# Patient Record
Sex: FEMALE | Race: WHITE | NOT HISPANIC OR LATINO | Employment: STUDENT | URBAN - METROPOLITAN AREA
[De-identification: names, ages, dates, MRNs, and addresses within clinical notes are randomized per-mention and may not be internally consistent; named-entity substitution may affect disease eponyms.]

---
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
## 2022-02-11 ENCOUNTER — TELEPHONE (OUTPATIENT)
Dept: OBSTETRICS AND GYNECOLOGY | Facility: CLINIC | Age: 22
End: 2022-02-11
Payer: COMMERCIAL

## 2022-02-11 NOTE — TELEPHONE ENCOUNTER
Pt thinks she may have a yeast infection because she is having thick white d/c.  Reports pain and bleeding during intercourse.  Also reports flank pain when she takes a deep breath and when she urinates.     Offered Rx for yeast.  Pt would like to be seen d/t recurrence.    Scheduled pt with Nneka at the [facility] on Monday.

## 2022-02-14 ENCOUNTER — OFFICE VISIT (OUTPATIENT)
Dept: OBSTETRICS AND GYNECOLOGY | Facility: CLINIC | Age: 22
End: 2022-02-14
Payer: COMMERCIAL

## 2022-02-14 ENCOUNTER — PATIENT MESSAGE (OUTPATIENT)
Dept: OBSTETRICS AND GYNECOLOGY | Facility: CLINIC | Age: 22
End: 2022-02-14

## 2022-02-14 VITALS
BODY MASS INDEX: 19.91 KG/M2 | DIASTOLIC BLOOD PRESSURE: 76 MMHG | SYSTOLIC BLOOD PRESSURE: 102 MMHG | WEIGHT: 119.5 LBS | HEIGHT: 65 IN

## 2022-02-14 DIAGNOSIS — N76.1 SUBACUTE VAGINITIS: Primary | ICD-10-CM

## 2022-02-14 PROCEDURE — 3074F PR MOST RECENT SYSTOLIC BLOOD PRESSURE < 130 MM HG: ICD-10-PCS | Mod: CPTII,S$GLB,, | Performed by: NURSE PRACTITIONER

## 2022-02-14 PROCEDURE — 3008F BODY MASS INDEX DOCD: CPT | Mod: CPTII,S$GLB,, | Performed by: NURSE PRACTITIONER

## 2022-02-14 PROCEDURE — 3008F PR BODY MASS INDEX (BMI) DOCUMENTED: ICD-10-PCS | Mod: CPTII,S$GLB,, | Performed by: NURSE PRACTITIONER

## 2022-02-14 PROCEDURE — 99999 PR PBB SHADOW E&M-EST. PATIENT-LVL III: ICD-10-PCS | Mod: PBBFAC,,, | Performed by: NURSE PRACTITIONER

## 2022-02-14 PROCEDURE — 3078F PR MOST RECENT DIASTOLIC BLOOD PRESSURE < 80 MM HG: ICD-10-PCS | Mod: CPTII,S$GLB,, | Performed by: NURSE PRACTITIONER

## 2022-02-14 PROCEDURE — 3078F DIAST BP <80 MM HG: CPT | Mod: CPTII,S$GLB,, | Performed by: NURSE PRACTITIONER

## 2022-02-14 PROCEDURE — 3074F SYST BP LT 130 MM HG: CPT | Mod: CPTII,S$GLB,, | Performed by: NURSE PRACTITIONER

## 2022-02-14 PROCEDURE — 99213 OFFICE O/P EST LOW 20 MIN: CPT | Mod: S$GLB,,, | Performed by: NURSE PRACTITIONER

## 2022-02-14 PROCEDURE — 1160F PR REVIEW ALL MEDS BY PRESCRIBER/CLIN PHARMACIST DOCUMENTED: ICD-10-PCS | Mod: CPTII,S$GLB,, | Performed by: NURSE PRACTITIONER

## 2022-02-14 PROCEDURE — 99999 PR PBB SHADOW E&M-EST. PATIENT-LVL III: CPT | Mod: PBBFAC,,, | Performed by: NURSE PRACTITIONER

## 2022-02-14 PROCEDURE — 87798 DETECT AGENT NOS DNA AMP: CPT | Mod: 59 | Performed by: NURSE PRACTITIONER

## 2022-02-14 PROCEDURE — 1159F MED LIST DOCD IN RCRD: CPT | Mod: CPTII,S$GLB,, | Performed by: NURSE PRACTITIONER

## 2022-02-14 PROCEDURE — 99213 PR OFFICE/OUTPT VISIT, EST, LEVL III, 20-29 MIN: ICD-10-PCS | Mod: S$GLB,,, | Performed by: NURSE PRACTITIONER

## 2022-02-14 PROCEDURE — 1160F RVW MEDS BY RX/DR IN RCRD: CPT | Mod: CPTII,S$GLB,, | Performed by: NURSE PRACTITIONER

## 2022-02-14 PROCEDURE — 1159F PR MEDICATION LIST DOCUMENTED IN MEDICAL RECORD: ICD-10-PCS | Mod: CPTII,S$GLB,, | Performed by: NURSE PRACTITIONER

## 2022-02-14 PROCEDURE — 87510 GARDNER VAG DNA DIR PROBE: CPT | Performed by: NURSE PRACTITIONER

## 2022-02-14 PROCEDURE — 87798 DETECT AGENT NOS DNA AMP: CPT | Performed by: NURSE PRACTITIONER

## 2022-02-14 NOTE — PROGRESS NOTES
CC: Vaginal Discharge    Inez Escobar is a 21 y.o. female  presents with complaint of vaginal discharge intermittently for the past 5-6 months. Reports irritation to vagina. She also reported occasional burning discomfort to vagina with intercourse and occasional deep pain with intercourse. Mirena IUD, new sexual partner x 5-6 months. History of yeast infections in the past. Declines STD testing.     ROS:  GENERAL: No fever, chills, fatigability or weight loss.  VULVAR: No pain, no lesions and no itching.  VAGINAL: No relaxation, no itching, no discharge, no abnormal bleeding and no lesions.  ABDOMEN: No abdominal pain. Denies nausea. Denies vomiting. No diarrhea. No constipation  BREAST: Denies pain. No lumps. No discharge.  URINARY: No incontinence, no nocturia, no frequency and no dysuria.  CARDIOVASCULAR: No chest pain. No shortness of breath. No leg cramps.  NEUROLOGICAL: No headaches. No vision changes.    PHYSICAL EXAM:  VULVA: normal appearing vulva with no masses, tenderness or lesions   VAGINA: normal appearing vagina with normal color and moderate amount of thin yellow/white discharge, no lesions   CERVIX: normal appearing cervix without discharge or lesions. IUD strings present, extending 2 cm from cervical os.   UTERUS: uterus is normal size, shape, consistency and nontender   ADNEXA: normal adnexa in size, nontender and no masses    ASSESSMENT and PLAN:    ICD-10-CM ICD-9-CM    1. Subacute vaginitis  N76.1 616.10 Vaginosis Screen by DNA Probe      Ureaplasma PCR Cervix      Mycoplasma genitalium Molecular Detection, PCR Cervix      Mycoplasma genitalium Molecular Detection, PCR Cervix      Ureaplasma PCR Cervix     Affirm, Mycoplasma/Ureaplasma testing, will await results for treatment.    Patient was counseled today on vaginitis prevention including :  a. avoiding feminine products such as deoderant soaps, body wash, bubble bath, douches, scented toilet paper, deoderant tampons or  pads, feminine wipes, chronic pad use, etc.  b. avoiding other vulvovaginal irritants such as long hot baths, humidity, tight, synthetic clothing, chlorine and sitting around in wet bathing suits  c. wearing cotton underwear, avoiding thong underwear and no underwear to bed  d. taking showers instead of baths and use a hair dryer on cool setting afterwards to dry  e. wearing cotton to exercise and shower immediately after exercise and change clothes  f. using polyurethane condoms without spermicide if sexually active and symptoms are triggered by intercourse    FOLLOW UP: PRN lack of improvement.      Nneka Herrera, FNP-C

## 2022-02-15 LAB
CANDIDA RRNA VAG QL PROBE: NEGATIVE
G VAGINALIS RRNA GENITAL QL PROBE: NEGATIVE
T VAGINALIS RRNA GENITAL QL PROBE: NEGATIVE

## 2022-02-16 ENCOUNTER — PATIENT MESSAGE (OUTPATIENT)
Dept: OBSTETRICS AND GYNECOLOGY | Facility: CLINIC | Age: 22
End: 2022-02-16
Payer: COMMERCIAL

## 2022-02-16 ENCOUNTER — TELEPHONE (OUTPATIENT)
Dept: OBSTETRICS AND GYNECOLOGY | Facility: CLINIC | Age: 22
End: 2022-02-16
Payer: COMMERCIAL

## 2022-02-16 NOTE — TELEPHONE ENCOUNTER
Unable to reach pt. To let her know that her results are not all final, they are still in process.

## 2022-02-17 LAB
MYCOPLASMA GENITALIUM RESULT: NEGATIVE
SPECIMEN SOURCE: ABNORMAL
SPECIMEN SOURCE: NORMAL
U PARVUM DNA SPEC QL NAA+PROBE: POSITIVE
U UREALYTICUM DNA SPEC QL NAA+PROBE: NEGATIVE

## 2022-02-21 ENCOUNTER — TELEPHONE (OUTPATIENT)
Dept: OBSTETRICS AND GYNECOLOGY | Facility: CLINIC | Age: 22
End: 2022-02-21
Payer: COMMERCIAL

## 2022-02-21 ENCOUNTER — PATIENT MESSAGE (OUTPATIENT)
Dept: OBSTETRICS AND GYNECOLOGY | Facility: CLINIC | Age: 22
End: 2022-02-21
Payer: COMMERCIAL

## 2022-02-23 ENCOUNTER — TELEPHONE (OUTPATIENT)
Dept: OBSTETRICS AND GYNECOLOGY | Facility: CLINIC | Age: 22
End: 2022-02-23
Payer: COMMERCIAL

## 2022-02-23 DIAGNOSIS — Z22.39 CARRIER OF UREAPLASMA UREALYTICUM: Primary | ICD-10-CM

## 2022-02-23 RX ORDER — METRONIDAZOLE 500 MG/1
500 TABLET ORAL 2 TIMES DAILY
Qty: 28 TABLET | Refills: 0 | Status: SHIPPED | OUTPATIENT
Start: 2022-02-23 | End: 2022-03-09

## 2022-02-23 RX ORDER — DOXYCYCLINE 100 MG/1
100 CAPSULE ORAL 2 TIMES DAILY
Qty: 28 CAPSULE | Refills: 0 | Status: SHIPPED | OUTPATIENT
Start: 2022-02-23 | End: 2022-03-09

## 2022-02-23 NOTE — TELEPHONE ENCOUNTER
Contact made with patient, +zunilda peguero to pharamcy with instructions. Possible ABX allergy for partner, he will request TX from his PCP.

## 2022-03-08 ENCOUNTER — PATIENT MESSAGE (OUTPATIENT)
Dept: OBSTETRICS AND GYNECOLOGY | Facility: CLINIC | Age: 22
End: 2022-03-08
Payer: COMMERCIAL

## 2022-05-12 ENCOUNTER — HOSPITAL ENCOUNTER (OUTPATIENT)
Dept: CARDIOLOGY | Facility: HOSPITAL | Age: 22
Discharge: HOME | End: 2022-05-12
Attending: INTERNAL MEDICINE
Payer: COMMERCIAL

## 2022-05-12 ENCOUNTER — OFFICE VISIT (OUTPATIENT)
Dept: INTERNAL MEDICINE | Facility: CLINIC | Age: 22
End: 2022-05-12
Payer: COMMERCIAL

## 2022-05-12 ENCOUNTER — LAB VISIT (OUTPATIENT)
Dept: LAB | Facility: HOSPITAL | Age: 22
End: 2022-05-12
Attending: INTERNAL MEDICINE
Payer: COMMERCIAL

## 2022-05-12 VITALS
WEIGHT: 120.13 LBS | BODY MASS INDEX: 20.01 KG/M2 | DIASTOLIC BLOOD PRESSURE: 70 MMHG | HEIGHT: 65 IN | OXYGEN SATURATION: 98 % | HEART RATE: 83 BPM | SYSTOLIC BLOOD PRESSURE: 110 MMHG

## 2022-05-12 DIAGNOSIS — R42 LIGHTHEADEDNESS: ICD-10-CM

## 2022-05-12 DIAGNOSIS — R10.9 ABDOMINAL CRAMPS: ICD-10-CM

## 2022-05-12 DIAGNOSIS — F31.9 BIPOLAR AFFECTIVE DISORDER, REMISSION STATUS UNSPECIFIED: ICD-10-CM

## 2022-05-12 DIAGNOSIS — R00.2 PALPITATIONS: Primary | ICD-10-CM

## 2022-05-12 DIAGNOSIS — R00.2 PALPITATIONS: ICD-10-CM

## 2022-05-12 DIAGNOSIS — R11.2 NAUSEA AND VOMITING, INTRACTABILITY OF VOMITING NOT SPECIFIED, UNSPECIFIED VOMITING TYPE: ICD-10-CM

## 2022-05-12 LAB
ALBUMIN SERPL BCP-MCNC: 3.9 G/DL (ref 3.5–5.2)
ALP SERPL-CCNC: 71 U/L (ref 55–135)
ALT SERPL W/O P-5'-P-CCNC: 33 U/L (ref 10–44)
AMYLASE SERPL-CCNC: 53 U/L (ref 20–110)
ANION GAP SERPL CALC-SCNC: 8 MMOL/L (ref 8–16)
AST SERPL-CCNC: 33 U/L (ref 10–40)
BASOPHILS # BLD AUTO: 0.05 K/UL (ref 0–0.2)
BASOPHILS NFR BLD: 1 % (ref 0–1.9)
BILIRUB SERPL-MCNC: 0.5 MG/DL (ref 0.1–1)
BUN SERPL-MCNC: 10 MG/DL (ref 6–20)
CALCIUM SERPL-MCNC: 9.7 MG/DL (ref 8.7–10.5)
CHLORIDE SERPL-SCNC: 104 MMOL/L (ref 95–110)
CO2 SERPL-SCNC: 25 MMOL/L (ref 23–29)
CREAT SERPL-MCNC: 0.7 MG/DL (ref 0.5–1.4)
DIFFERENTIAL METHOD: NORMAL
EOSINOPHIL # BLD AUTO: 0.1 K/UL (ref 0–0.5)
EOSINOPHIL NFR BLD: 2.9 % (ref 0–8)
ERYTHROCYTE [DISTWIDTH] IN BLOOD BY AUTOMATED COUNT: 11.9 % (ref 11.5–14.5)
EST. GFR  (AFRICAN AMERICAN): >60 ML/MIN/1.73 M^2
EST. GFR  (NON AFRICAN AMERICAN): >60 ML/MIN/1.73 M^2
GGT SERPL-CCNC: 24 U/L (ref 8–55)
GLUCOSE SERPL-MCNC: 88 MG/DL (ref 70–110)
HCT VFR BLD AUTO: 39.4 % (ref 37–48.5)
HGB BLD-MCNC: 13.2 G/DL (ref 12–16)
IMM GRANULOCYTES # BLD AUTO: 0 K/UL (ref 0–0.04)
IMM GRANULOCYTES NFR BLD AUTO: 0 % (ref 0–0.5)
LIPASE SERPL-CCNC: 28 U/L (ref 4–60)
LYMPHOCYTES # BLD AUTO: 1.7 K/UL (ref 1–4.8)
LYMPHOCYTES NFR BLD: 35.5 % (ref 18–48)
MCH RBC QN AUTO: 30.5 PG (ref 27–31)
MCHC RBC AUTO-ENTMCNC: 33.5 G/DL (ref 32–36)
MCV RBC AUTO: 91 FL (ref 82–98)
MONOCYTES # BLD AUTO: 0.6 K/UL (ref 0.3–1)
MONOCYTES NFR BLD: 12.5 % (ref 4–15)
NEUTROPHILS # BLD AUTO: 2.3 K/UL (ref 1.8–7.7)
NEUTROPHILS NFR BLD: 48.1 % (ref 38–73)
NRBC BLD-RTO: 0 /100 WBC
PLATELET # BLD AUTO: 306 K/UL (ref 150–450)
PMV BLD AUTO: 10.8 FL (ref 9.2–12.9)
POTASSIUM SERPL-SCNC: 4.1 MMOL/L (ref 3.5–5.1)
PROT SERPL-MCNC: 7 G/DL (ref 6–8.4)
RBC # BLD AUTO: 4.33 M/UL (ref 4–5.4)
SODIUM SERPL-SCNC: 137 MMOL/L (ref 136–145)
T4 FREE SERPL-MCNC: 0.82 NG/DL (ref 0.71–1.51)
TSH SERPL DL<=0.005 MIU/L-ACNC: 0.78 UIU/ML (ref 0.4–4)
WBC # BLD AUTO: 4.87 K/UL (ref 3.9–12.7)

## 2022-05-12 PROCEDURE — 36415 COLL VENOUS BLD VENIPUNCTURE: CPT | Performed by: INTERNAL MEDICINE

## 2022-05-12 PROCEDURE — 1159F PR MEDICATION LIST DOCUMENTED IN MEDICAL RECORD: ICD-10-PCS | Mod: CPTII,S$GLB,, | Performed by: INTERNAL MEDICINE

## 2022-05-12 PROCEDURE — 84443 ASSAY THYROID STIM HORMONE: CPT | Performed by: INTERNAL MEDICINE

## 2022-05-12 PROCEDURE — 3008F BODY MASS INDEX DOCD: CPT | Mod: CPTII,S$GLB,, | Performed by: INTERNAL MEDICINE

## 2022-05-12 PROCEDURE — 99214 PR OFFICE/OUTPT VISIT, EST, LEVL IV, 30-39 MIN: ICD-10-PCS | Mod: S$GLB,,, | Performed by: INTERNAL MEDICINE

## 2022-05-12 PROCEDURE — 93005 ELECTROCARDIOGRAM TRACING: CPT | Mod: S$GLB,,, | Performed by: INTERNAL MEDICINE

## 2022-05-12 PROCEDURE — 93005 EKG 12-LEAD: ICD-10-PCS | Mod: S$GLB,,, | Performed by: INTERNAL MEDICINE

## 2022-05-12 PROCEDURE — 83690 ASSAY OF LIPASE: CPT | Performed by: INTERNAL MEDICINE

## 2022-05-12 PROCEDURE — 85025 COMPLETE CBC W/AUTO DIFF WBC: CPT | Performed by: INTERNAL MEDICINE

## 2022-05-12 PROCEDURE — 82977 ASSAY OF GGT: CPT | Performed by: INTERNAL MEDICINE

## 2022-05-12 PROCEDURE — 99999 PR PBB SHADOW E&M-EST. PATIENT-LVL IV: ICD-10-PCS | Mod: PBBFAC,,, | Performed by: INTERNAL MEDICINE

## 2022-05-12 PROCEDURE — 93227 HOLTER MONITOR - 48 HOUR (CUPID ONLY): ICD-10-PCS | Mod: ,,, | Performed by: INTERNAL MEDICINE

## 2022-05-12 PROCEDURE — 3008F PR BODY MASS INDEX (BMI) DOCUMENTED: ICD-10-PCS | Mod: CPTII,S$GLB,, | Performed by: INTERNAL MEDICINE

## 2022-05-12 PROCEDURE — 99214 OFFICE O/P EST MOD 30 MIN: CPT | Mod: S$GLB,,, | Performed by: INTERNAL MEDICINE

## 2022-05-12 PROCEDURE — 84439 ASSAY OF FREE THYROXINE: CPT | Performed by: INTERNAL MEDICINE

## 2022-05-12 PROCEDURE — 3074F PR MOST RECENT SYSTOLIC BLOOD PRESSURE < 130 MM HG: ICD-10-PCS | Mod: CPTII,S$GLB,, | Performed by: INTERNAL MEDICINE

## 2022-05-12 PROCEDURE — 1159F MED LIST DOCD IN RCRD: CPT | Mod: CPTII,S$GLB,, | Performed by: INTERNAL MEDICINE

## 2022-05-12 PROCEDURE — 93226 XTRNL ECG REC<48 HR SCAN A/R: CPT | Mod: PO

## 2022-05-12 PROCEDURE — 3074F SYST BP LT 130 MM HG: CPT | Mod: CPTII,S$GLB,, | Performed by: INTERNAL MEDICINE

## 2022-05-12 PROCEDURE — 93010 EKG 12-LEAD: ICD-10-PCS | Mod: S$GLB,,, | Performed by: INTERNAL MEDICINE

## 2022-05-12 PROCEDURE — 3078F DIAST BP <80 MM HG: CPT | Mod: CPTII,S$GLB,, | Performed by: INTERNAL MEDICINE

## 2022-05-12 PROCEDURE — 80053 COMPREHEN METABOLIC PANEL: CPT | Performed by: INTERNAL MEDICINE

## 2022-05-12 PROCEDURE — 99999 PR PBB SHADOW E&M-EST. PATIENT-LVL IV: CPT | Mod: PBBFAC,,, | Performed by: INTERNAL MEDICINE

## 2022-05-12 PROCEDURE — 93010 ELECTROCARDIOGRAM REPORT: CPT | Mod: S$GLB,,, | Performed by: INTERNAL MEDICINE

## 2022-05-12 PROCEDURE — 82150 ASSAY OF AMYLASE: CPT | Performed by: INTERNAL MEDICINE

## 2022-05-12 PROCEDURE — 3078F PR MOST RECENT DIASTOLIC BLOOD PRESSURE < 80 MM HG: ICD-10-PCS | Mod: CPTII,S$GLB,, | Performed by: INTERNAL MEDICINE

## 2022-05-12 PROCEDURE — 93227 XTRNL ECG REC<48 HR R&I: CPT | Mod: ,,, | Performed by: INTERNAL MEDICINE

## 2022-05-12 RX ORDER — ISOTRETINOIN 30 MG/1
30 CAPSULE ORAL 2 TIMES DAILY
COMMUNITY
Start: 2022-04-28 | End: 2022-12-22

## 2022-05-12 RX ORDER — TRIAMCINOLONE ACETONIDE 1 MG/G
CREAM TOPICAL
COMMUNITY
Start: 2022-04-26 | End: 2022-12-22

## 2022-05-12 NOTE — PROGRESS NOTES
[facility]    Chief Complaint      Chief Complaint   Patient presents with    Establish Care    Abdominal Pain    Dizziness    Nausea       History of Present Illness      Inez Escobar is a 21 y.o. female with chronic conditions of anxiety, depression who presents today for: establish care and complaints of nausea with occasional vomiting, abdominal cramps and burning (no reliable), constipation, occasional blood in stool.  No urinary complaints.  Symptoms not postprandial.  Symptoms can improve with eating for a short period of time.  Usually when awake. Symptoms have been present since childhood.  Symptoms seem worse during menstrual cycle.  Has seen Dr. Smith, GI, and will be getting EGD/cscope in 2 weeks.   Complains of lightheadedness/orthostasis in the past 3-4 months. Occurs few times weekly.   At this point, can last throughout the day.  Anxiety, depression: Sees Dr. Isa Blackwell, psychiatry.  On lexapro (few years), lamictal (started 7/2021).  Pt feels like her symptom control has been good on this regimen.    Flu shot missed last season.  TdAP 2014.      Past Medical History:  Past Medical History:   Diagnosis Date    Anxiety     Bipolar 1 disorder     Depression        Past Surgical History:   has no past surgical history on file.    Family History:  family history includes Alcohol abuse in her father and paternal grandmother; Arrhythmia in her maternal grandmother; Congenital heart disease in her maternal grandmother; Depression in her father, maternal grandmother, and mother; Diabetes in her father, maternal grandmother, and paternal grandmother; Drug abuse in her father, maternal aunt, and paternal grandmother; Emphysema in her maternal grandfather; Heart disease in her father and maternal grandmother; Mental illness in her father, maternal aunt, maternal aunt, maternal grandmother, and mother; Migraines in her mother; Miscarriages / Stillbirths in her  mother; Multiple sclerosis in her paternal grandmother; No Known Problems in her brother, paternal grandfather, and sister.     Social History:  Social History     Tobacco Use    Smoking status: Never Smoker    Smokeless tobacco: Never Used   Substance Use Topics    Alcohol use: No    Drug use: Never       I personally reviewed all past medical, surgical, social and family history.    Review of Systems   Constitutional: Negative for chills, fever and malaise/fatigue.   Respiratory: Negative for shortness of breath.    Cardiovascular: Negative for chest pain.   Gastrointestinal: Negative for constipation, diarrhea, nausea and vomiting.   Skin: Negative for rash.   Neurological: Negative for weakness.   All other systems reviewed and are negative.       Medications:  Outpatient Encounter Medications as of 2022   Medication Sig Dispense Refill    ABSORICA 30 mg capsule Take 30 mg by mouth 2 (two) times daily.      EScitalopram oxalate (LEXAPRO) 20 MG tablet Take 20 mg by mouth once daily.      lamoTRIgine (LAMICTAL) 200 MG tablet Take 200 mg by mouth once daily.      triamcinolone acetonide 0.1% (KENALOG) 0.1 % cream SMARTSI Application Topical 2-3 Times Daily      valACYclovir (VALTREX) 500 MG tablet TK 1 T PO BID FOR 7 DAYS 30 tablet 3    [DISCONTINUED] fluconazole (DIFLUCAN) 150 MG Tab 1 pod QD and if not better in 3 days take a second pill 2 tablet 0    [DISCONTINUED] propranoloL (INDERAL) 10 MG tablet Take 10 mg by mouth once daily.       Facility-Administered Encounter Medications as of 2022   Medication Dose Route Frequency Provider Last Rate Last Admin    levonorgestreL 20 mcg/24 hours (5 yrs) 52 mg IUD 1 Intra Uterine Device  1 Intra Uterine Device Intrauterine  Karie Storey MD   1 Intra Uterine Device at 20 1415       Allergies:  Review of patient's allergies indicates:  No Known Allergies    Health Maintenance:  Immunization History   Administered Date(s) Administered     "DTaP 01/21/2003, 05/05/2003, 08/05/2003, 09/09/2004, 02/11/2008    HPV 9-Valent 08/01/2019, 09/02/2020    Hep B / HiB 2002, 03/13/2003, 11/17/2003    Hepatitis A, Pediatric/Adolescent, 2 Dose 08/01/2019    IPV 01/21/2003, 05/05/2003, 02/06/2004, 09/09/2004, 02/05/2009    MMR 02/06/2004, 02/11/2008    Meningococcal Conjugate (MCV4P) 06/25/2014, 08/01/2019    Pneumococcal Conjugate - 7 Valent 2002, 03/13/2003, 07/23/2003, 09/09/2004    Tdap 06/25/2014    Varicella 11/17/2003, 02/11/2008      Health Maintenance   Topic Date Due    Hepatitis C Screening  Never done    Lipid Panel  Never done    HPV Vaccines (3 - 3-dose series) 01/02/2021    Chlamydia Screening  11/29/2022    TETANUS VACCINE  06/25/2024        Physical Exam      Vital Signs  Pulse: 83  SpO2: 98 %  BP: 110/70  BP Location: Right arm  Patient Position: Sitting  Pain Score:   4  Pain Loc: Abdomen  Height and Weight  Height: 5' 5" (165.1 cm)  Weight: 54.5 kg (120 lb 2.4 oz)  BSA (Calculated - sq m): 1.58 sq meters  BMI (Calculated): 20  Weight in (lb) to have BMI = 25: 149.9]    Physical Exam  Vitals reviewed.   Constitutional:       Appearance: She is well-developed.   HENT:      Head: Normocephalic and atraumatic.      Right Ear: External ear normal.      Left Ear: External ear normal.   Cardiovascular:      Rate and Rhythm: Normal rate and regular rhythm.      Heart sounds: Normal heart sounds. No murmur heard.  Pulmonary:      Effort: Pulmonary effort is normal.      Breath sounds: Normal breath sounds. No wheezing or rales.   Abdominal:      General: Bowel sounds are normal. There is no distension.      Palpations: Abdomen is soft.      Tenderness: There is no abdominal tenderness.          Laboratory:  CBC:      CMP:        Invalid input(s): CREATININ  URINALYSIS:       LIPIDS:      TSH:      A1C:        Assessment/Plan     Inez Escobar is a 21 y.o.female with:    1. Palpitations  - Ambulatory referral/consult to " Cardiology; Future  - EKG 12-lead  - Holter monitor - 48 hour; Future  EKG in office with normal sinus rhythm, normal intervals, no ST segment changes or TWI.  Will check   2. Lightheadedness  - Ambulatory referral/consult to Cardiology; Future  - EKG 12-lead  - Holter monitor - 48 hour; Future  3. Abdominal cramps  - US Abdomen Complete; Future  4. Nausea and vomiting, intractability of vomiting not specified, unspecified vomiting type  - US Abdomen Complete; Future  Take dicyclomine, ondansetron previously prescribed as needed.    5. Bipolar affective disorder, remission status unspecified  Continue current meds.      Chronic conditions status updated as per HPI.  Other than changes above, cont current medications and maintain follow up with specialists.  No follow-ups on file.    No future appointments.    Chepe Cheek MD  [facility]

## 2022-05-16 ENCOUNTER — HOSPITAL ENCOUNTER (OUTPATIENT)
Dept: RADIOLOGY | Facility: HOSPITAL | Age: 22
Discharge: HOME | End: 2022-05-16
Attending: INTERNAL MEDICINE
Payer: COMMERCIAL

## 2022-05-16 DIAGNOSIS — R10.9 ABDOMINAL CRAMPS: ICD-10-CM

## 2022-05-16 DIAGNOSIS — R11.2 NAUSEA AND VOMITING, INTRACTABILITY OF VOMITING NOT SPECIFIED, UNSPECIFIED VOMITING TYPE: ICD-10-CM

## 2022-05-16 PROCEDURE — 76700 US EXAM ABDOM COMPLETE: CPT | Mod: TC,PO

## 2022-05-16 PROCEDURE — 76700 US EXAM ABDOM COMPLETE: CPT | Mod: 26,,, | Performed by: RADIOLOGY

## 2022-05-16 PROCEDURE — 76700 US ABDOMEN COMPLETE: ICD-10-PCS | Mod: 26,,, | Performed by: RADIOLOGY

## 2022-05-19 LAB
OHS CV EVENT MONITOR DAY: 0
OHS CV HOLTER LENGTH DECIMAL HOURS: 47.98
OHS CV HOLTER LENGTH HOURS: 47
OHS CV HOLTER LENGTH MINUTES: 59
OHS CV HOLTER SINUS AVERAGE HR: 79
OHS CV HOLTER SINUS MAX HR: 162
OHS CV HOLTER SINUS MIN HR: 49

## 2022-05-19 NOTE — PROGRESS NOTES
Holter monitor shows a benign fast heart rate called sinus tachycardia.  This suggests that there is no harmful underlying heart electrical condition contributing to her palpitations.  I can consult with a cardiologist on her behalf via an e-consult to determine if any further testing is needed or if any medication can be offered.  If she's interested (small fee associated), I can proceed.

## 2022-05-20 ENCOUNTER — PATIENT MESSAGE (OUTPATIENT)
Dept: INTERNAL MEDICINE | Facility: CLINIC | Age: 22
End: 2022-05-20
Payer: COMMERCIAL

## 2022-05-20 DIAGNOSIS — R42 LIGHTHEADEDNESS: ICD-10-CM

## 2022-05-20 DIAGNOSIS — R00.2 PALPITATIONS: Primary | ICD-10-CM

## 2022-05-22 ENCOUNTER — E-CONSULT (OUTPATIENT)
Dept: CARDIOLOGY | Facility: CLINIC | Age: 22
End: 2022-05-22
Payer: COMMERCIAL

## 2022-05-22 DIAGNOSIS — R42 DIZZINESS: ICD-10-CM

## 2022-05-22 PROCEDURE — 99446 NTRPROF PH1/NTRNET/EHR 5-10: CPT | Mod: S$GLB,,, | Performed by: INTERNAL MEDICINE

## 2022-05-22 PROCEDURE — 99446 PR INTERPROF, PHONE/INTERNET/EHR, CONSULT, 5-10 MINS: ICD-10-PCS | Mod: S$GLB,,, | Performed by: INTERNAL MEDICINE

## 2022-05-22 NOTE — PROGRESS NOTES
[facility]  Response for E-Consult     Patient Name: Inez Escobar  MRN: [id number]  Primary Care Provider: Chepe Cheek MD   Requesting Provider: Mychart, Generic Provid*      Findings: Chart and holter reviewed, I would order echo to rule out structural heart disease, otherwise sx may be vasovagal, I would just monitor, no B blockers indicated  Currently. Pt can follow up in cardiology clinic if sx persist    I did not speak to the requesting provider verbally about this.     Total time of Consultation: 10 minute    Percentage of time spent on verbal/written discussion: 50%     Thank you for your consult.     Edgard Pena MD  [facility]

## 2022-05-24 ENCOUNTER — PATIENT OUTREACH (OUTPATIENT)
Dept: ADMINISTRATIVE | Facility: HOSPITAL | Age: 22
End: 2022-05-24
Payer: COMMERCIAL

## 2022-05-24 NOTE — PROGRESS NOTES
Health Maintenance Due   Topic Date Due    Hepatitis C Screening  Never done    Lipid Panel  Never done    COVID-19 Vaccine (1) Never done    HIV Screening  Never done    HPV Vaccines (3 - 3-dose series) 01/02/2021        updated.  Immunizations reconciled.  EGD and Colonoscopy report scanned into chart and sent to PCP.    Emily Huber LPN   Clinical Care Coordinator  [facility]ness

## 2022-05-26 ENCOUNTER — OFFICE VISIT (OUTPATIENT)
Dept: OBSTETRICS AND GYNECOLOGY | Facility: CLINIC | Age: 22
End: 2022-05-26
Attending: OBSTETRICS & GYNECOLOGY
Payer: COMMERCIAL

## 2022-05-26 ENCOUNTER — TELEPHONE (OUTPATIENT)
Dept: OBSTETRICS AND GYNECOLOGY | Facility: CLINIC | Age: 22
End: 2022-05-26
Payer: COMMERCIAL

## 2022-05-26 VITALS
BODY MASS INDEX: 19.54 KG/M2 | HEIGHT: 65 IN | WEIGHT: 117.31 LBS | SYSTOLIC BLOOD PRESSURE: 112 MMHG | DIASTOLIC BLOOD PRESSURE: 62 MMHG

## 2022-05-26 DIAGNOSIS — N90.89 SWELLING OF VULVA: ICD-10-CM

## 2022-05-26 DIAGNOSIS — N76.0 ACUTE VAGINITIS: Primary | ICD-10-CM

## 2022-05-26 PROCEDURE — 99213 OFFICE O/P EST LOW 20 MIN: CPT | Mod: S$GLB,,, | Performed by: OBSTETRICS & GYNECOLOGY

## 2022-05-26 PROCEDURE — 3074F PR MOST RECENT SYSTOLIC BLOOD PRESSURE < 130 MM HG: ICD-10-PCS | Mod: CPTII,S$GLB,, | Performed by: OBSTETRICS & GYNECOLOGY

## 2022-05-26 PROCEDURE — 3008F PR BODY MASS INDEX (BMI) DOCUMENTED: ICD-10-PCS | Mod: CPTII,S$GLB,, | Performed by: OBSTETRICS & GYNECOLOGY

## 2022-05-26 PROCEDURE — 99999 PR PBB SHADOW E&M-EST. PATIENT-LVL III: ICD-10-PCS | Mod: PBBFAC,,, | Performed by: OBSTETRICS & GYNECOLOGY

## 2022-05-26 PROCEDURE — 3078F DIAST BP <80 MM HG: CPT | Mod: CPTII,S$GLB,, | Performed by: OBSTETRICS & GYNECOLOGY

## 2022-05-26 PROCEDURE — 3078F PR MOST RECENT DIASTOLIC BLOOD PRESSURE < 80 MM HG: ICD-10-PCS | Mod: CPTII,S$GLB,, | Performed by: OBSTETRICS & GYNECOLOGY

## 2022-05-26 PROCEDURE — 87801 DETECT AGNT MULT DNA AMPLI: CPT | Performed by: OBSTETRICS & GYNECOLOGY

## 2022-05-26 PROCEDURE — 87481 CANDIDA DNA AMP PROBE: CPT | Mod: 59 | Performed by: OBSTETRICS & GYNECOLOGY

## 2022-05-26 PROCEDURE — 99999 PR PBB SHADOW E&M-EST. PATIENT-LVL III: CPT | Mod: PBBFAC,,, | Performed by: OBSTETRICS & GYNECOLOGY

## 2022-05-26 PROCEDURE — 1159F MED LIST DOCD IN RCRD: CPT | Mod: CPTII,S$GLB,, | Performed by: OBSTETRICS & GYNECOLOGY

## 2022-05-26 PROCEDURE — 1159F PR MEDICATION LIST DOCUMENTED IN MEDICAL RECORD: ICD-10-PCS | Mod: CPTII,S$GLB,, | Performed by: OBSTETRICS & GYNECOLOGY

## 2022-05-26 PROCEDURE — 3074F SYST BP LT 130 MM HG: CPT | Mod: CPTII,S$GLB,, | Performed by: OBSTETRICS & GYNECOLOGY

## 2022-05-26 PROCEDURE — 99213 PR OFFICE/OUTPT VISIT, EST, LEVL III, 20-29 MIN: ICD-10-PCS | Mod: S$GLB,,, | Performed by: OBSTETRICS & GYNECOLOGY

## 2022-05-26 PROCEDURE — 3008F BODY MASS INDEX DOCD: CPT | Mod: CPTII,S$GLB,, | Performed by: OBSTETRICS & GYNECOLOGY

## 2022-05-26 NOTE — TELEPHONE ENCOUNTER
Pt had a colonoscopy 2 days ago where they took multiple biopsies.  Had intercourse yesterday but once she started feeling pain, she stopped.  Her vagina seemed swollen and 4-5 hours later looked worse.  Pt's mother looked and said something was hanging out.  Spoke to her medical friends and was told it's possibly a prolapse.  Had some spotting yesterday but currently denies VB or pain at this time.  Pt can visually see an inch coming down from the labia.  Pt requesting to be seen today.    Advised that Dr. Storey had a full schedule with pts in labor and didn't want to add her on in case she wouldn't able to be seen, but would check with MD.    Please advise, thanks

## 2022-05-26 NOTE — PROGRESS NOTES
Subjective:       Patient ID: Inez Escobar is a 21 y.o. female.    Chief Complaint:  Vaginal Prolapse        History of Present Illness  Inez Escobar is a 21 y.o. female  called today asking to be seen due to vaginal pain and swelling. She had a colonoscopy 2 days ago. A few biopsies were done. Then the day after the colonoscopy she had sex. She had to stop in the middle because of pain. She looked and it was swollen and something was sticking out. Mom looked and they were concerned for prolapse. Therefore she was brought in today for a visit. No fever or chills. No new soaps, detergents, lubricants, condoms, etc.     Patient's last menstrual period was 2022 (lmp unknown).   Date of Last Pap: No result found    Review of Systems  Review of Systems   Constitutional: Negative for chills and fever.        Objective:   Physical Exam:               Genitourinary: The external female genitalia was abnormal.      Genitourinary Comments: Bilateral labia majora and minora are swollen, looks like an allergic reaction. No ulcers, +white discharge                        Assessment/ Plan:     1. Acute vaginitis  Vaginosis Screen by DNA Probe   2. Swelling of vulva       Looks like an allergic reaction. Swelling of bilateral labia. Possibly from the prep/betadine place before colonoscopy??  She says it is much better today than yesterday, so for now I would just watch it and do nothing  Take Benadryl    Follow up if symptoms worsen or fail to improve.    As of 2021, the Cures Act has been passed nationally. This new law requires that all doctors progress notes, lab results, pathology reports and radiology reports be released IMMEDIATELY to the patient in the patient portal. That means that the results are released to you at the EXACT same time they are released to me. Therefore, with all of the patients that I have I am not able to reply to each patient exactly when the results come  in. So there will be a delay from when you see the results to when I see them and have time to come up with a response to send you. Also I only see these results when I am on the computer at work. So if the results come in over the weekend or after 5 pm of a work day, I will not see them until the next business day. As you can tell, this is a challenge as a physician to give every patient the quick response they hope for and deserve. So please be patient! Thanks for understanding, Dr. Storey

## 2022-05-26 NOTE — TELEPHONE ENCOUNTER
Advised per Dr. Storey.  [place] and will take 30-45 minutes.    Scheduled pt today.

## 2022-05-27 ENCOUNTER — PATIENT MESSAGE (OUTPATIENT)
Dept: OBSTETRICS AND GYNECOLOGY | Facility: CLINIC | Age: 22
End: 2022-05-27
Payer: COMMERCIAL

## 2022-05-30 LAB
BACTERIAL VAGINOSIS DNA: NEGATIVE
CANDIDA GLABRATA DNA: NEGATIVE
CANDIDA KRUSEI DNA: NEGATIVE
CANDIDA RRNA VAG QL PROBE: POSITIVE
T VAGINALIS RRNA GENITAL QL PROBE: NEGATIVE

## 2022-05-31 ENCOUNTER — TELEPHONE (OUTPATIENT)
Dept: OBSTETRICS AND GYNECOLOGY | Facility: CLINIC | Age: 22
End: 2022-05-31

## 2022-05-31 ENCOUNTER — PATIENT MESSAGE (OUTPATIENT)
Dept: INTERNAL MEDICINE | Facility: CLINIC | Age: 22
End: 2022-05-31
Payer: COMMERCIAL

## 2022-05-31 DIAGNOSIS — N76.0 ACUTE VAGINITIS: Primary | ICD-10-CM

## 2022-05-31 RX ORDER — FLUCONAZOLE 150 MG/1
150 TABLET ORAL DAILY
Qty: 2 TABLET | Refills: 0 | Status: SHIPPED | OUTPATIENT
Start: 2022-05-31 | End: 2022-06-02

## 2022-05-31 NOTE — TELEPHONE ENCOUNTER
Pt was in on 5/26 for what she thought was a vaginal prolapse.  A swab was done and she is positive for Candida.  Requesting Rx.  Also inquiring if GI got back with Dr. Storey regarding the ingredients of the prep used before her colonoscopy since that may have been the cause of her allergic rxn.  Aware Dr. Storey is not in today.    Diflucan pended    Dr. Bedolla, will you sign?  Dr. Storey, please advised regarding if GI got back with you, thanks!

## 2022-06-01 NOTE — TELEPHONE ENCOUNTER
Advised per Dr. Storey that GI has not gotten back to her.  But will keep pt posted as soon as we hear something.    Pt states her swelling has completely gone down.

## 2022-06-02 ENCOUNTER — PATIENT MESSAGE (OUTPATIENT)
Dept: INTERNAL MEDICINE | Facility: CLINIC | Age: 22
End: 2022-06-02
Payer: COMMERCIAL

## 2022-06-02 NOTE — PROGRESS NOTES
Please let patient know that cardiology has reviewed her case and is recommending an echocardiogram to evaluate for structural heart disease.  No medications to slow down rate of palpitations or heart rate is recommended at this time.  Order in for echo to schedule

## 2022-06-08 ENCOUNTER — PATIENT MESSAGE (OUTPATIENT)
Dept: OBSTETRICS AND GYNECOLOGY | Facility: CLINIC | Age: 22
End: 2022-06-08
Payer: COMMERCIAL

## 2022-06-09 ENCOUNTER — OFFICE VISIT (OUTPATIENT)
Dept: CARDIOLOGY | Facility: CLINIC | Age: 22
End: 2022-06-09
Payer: COMMERCIAL

## 2022-06-09 VITALS
SYSTOLIC BLOOD PRESSURE: 103 MMHG | DIASTOLIC BLOOD PRESSURE: 62 MMHG | WEIGHT: 119.06 LBS | HEIGHT: 65 IN | BODY MASS INDEX: 19.83 KG/M2 | HEART RATE: 91 BPM

## 2022-06-09 DIAGNOSIS — R00.2 PALPITATIONS: ICD-10-CM

## 2022-06-09 DIAGNOSIS — R42 LIGHTHEADEDNESS: ICD-10-CM

## 2022-06-09 DIAGNOSIS — U09.9 POST-COVID-19 CONDITION: ICD-10-CM

## 2022-06-09 PROCEDURE — 3074F PR MOST RECENT SYSTOLIC BLOOD PRESSURE < 130 MM HG: ICD-10-PCS | Mod: CPTII,S$GLB,, | Performed by: INTERNAL MEDICINE

## 2022-06-09 PROCEDURE — 3008F BODY MASS INDEX DOCD: CPT | Mod: CPTII,S$GLB,, | Performed by: INTERNAL MEDICINE

## 2022-06-09 PROCEDURE — 99999 PR PBB SHADOW E&M-EST. PATIENT-LVL III: ICD-10-PCS | Mod: PBBFAC,,, | Performed by: INTERNAL MEDICINE

## 2022-06-09 PROCEDURE — 99999 PR PBB SHADOW E&M-EST. PATIENT-LVL III: CPT | Mod: PBBFAC,,, | Performed by: INTERNAL MEDICINE

## 2022-06-09 PROCEDURE — 3078F PR MOST RECENT DIASTOLIC BLOOD PRESSURE < 80 MM HG: ICD-10-PCS | Mod: CPTII,S$GLB,, | Performed by: INTERNAL MEDICINE

## 2022-06-09 PROCEDURE — 99204 PR OFFICE/OUTPT VISIT, NEW, LEVL IV, 45-59 MIN: ICD-10-PCS | Mod: S$GLB,,, | Performed by: INTERNAL MEDICINE

## 2022-06-09 PROCEDURE — 1159F MED LIST DOCD IN RCRD: CPT | Mod: CPTII,S$GLB,, | Performed by: INTERNAL MEDICINE

## 2022-06-09 PROCEDURE — 1159F PR MEDICATION LIST DOCUMENTED IN MEDICAL RECORD: ICD-10-PCS | Mod: CPTII,S$GLB,, | Performed by: INTERNAL MEDICINE

## 2022-06-09 PROCEDURE — 3078F DIAST BP <80 MM HG: CPT | Mod: CPTII,S$GLB,, | Performed by: INTERNAL MEDICINE

## 2022-06-09 PROCEDURE — 3008F PR BODY MASS INDEX (BMI) DOCUMENTED: ICD-10-PCS | Mod: CPTII,S$GLB,, | Performed by: INTERNAL MEDICINE

## 2022-06-09 PROCEDURE — 3074F SYST BP LT 130 MM HG: CPT | Mod: CPTII,S$GLB,, | Performed by: INTERNAL MEDICINE

## 2022-06-09 PROCEDURE — 99204 OFFICE O/P NEW MOD 45 MIN: CPT | Mod: S$GLB,,, | Performed by: INTERNAL MEDICINE

## 2022-06-09 RX ORDER — FLUCONAZOLE 150 MG/1
150 TABLET ORAL DAILY
Qty: 1 TABLET | Refills: 1 | Status: SHIPPED | OUTPATIENT
Start: 2022-06-09 | End: 2022-06-11

## 2022-06-09 NOTE — PROGRESS NOTES
Subjective:    Patient ID:  Inez Escobar is a 21 y.o. female who presents for evaluation of Establish Care and Results      HPI21 yo WF with palpitations, light headed and numbness in both arms at times. EKG normal and had holter that is benign. Stays active and not any problems with exertion. Did have covid in the past.     Review of Systems   Constitutional: Negative for decreased appetite, fever, malaise/fatigue, weight gain and weight loss.   HENT: Negative for hearing loss and nosebleeds.    Eyes: Negative for visual disturbance.   Cardiovascular: Positive for irregular heartbeat and palpitations. Negative for chest pain, claudication, cyanosis, dyspnea on exertion, leg swelling, near-syncope, orthopnea, paroxysmal nocturnal dyspnea and syncope.   Respiratory: Negative for cough, hemoptysis, shortness of breath, sleep disturbances due to breathing, snoring and wheezing.    Endocrine: Negative for cold intolerance, heat intolerance, polydipsia and polyuria.   Hematologic/Lymphatic: Negative for adenopathy and bleeding problem. Does not bruise/bleed easily.   Skin: Negative for color change, itching, poor wound healing, rash and suspicious lesions.   Musculoskeletal: Negative for arthritis, back pain, falls, joint pain, joint swelling, muscle cramps, muscle weakness and myalgias.   Gastrointestinal: Negative for bloating, abdominal pain, change in bowel habit, constipation, flatus, heartburn, hematemesis, hematochezia, hemorrhoids, jaundice, melena, nausea and vomiting.   Genitourinary: Negative for bladder incontinence, decreased libido, frequency, hematuria, hesitancy and urgency.   Neurological: Positive for light-headedness. Negative for brief paralysis, difficulty with concentration, excessive daytime sleepiness, dizziness, focal weakness, headaches, loss of balance, numbness, vertigo and weakness.   Psychiatric/Behavioral: Negative for altered mental status, depression and memory loss. The  "patient is nervous/anxious. The patient does not have insomnia.    Allergic/Immunologic: Negative for environmental allergies, hives and persistent infections.        Objective:    Physical Exam  Vitals and nursing note reviewed.   Constitutional:       Appearance: She is well-developed.      Comments: /62 (BP Location: Right arm, Patient Position: Sitting, BP Method: Small (Automatic))   Pulse 91   Ht 5' 5" (1.651 m)   Wt 54 kg (119 lb 0.8 oz)   LMP 05/19/2022 (LMP Unknown)   BMI 19.81 kg/m²      HENT:      Head: Normocephalic and atraumatic.      Right Ear: External ear normal.      Left Ear: External ear normal.      Nose: Nose normal.   Eyes:      General: Lids are normal. No scleral icterus.        Right eye: No discharge.         Left eye: No discharge.      Conjunctiva/sclera: Conjunctivae normal.      Right eye: No hemorrhage.     Pupils: Pupils are equal, round, and reactive to light.   Neck:      Thyroid: No thyromegaly.      Vascular: No JVD.      Trachea: No tracheal deviation.   Cardiovascular:      Rate and Rhythm: Normal rate and regular rhythm.      Pulses: Intact distal pulses.      Heart sounds: Normal heart sounds. No murmur heard.    No friction rub. No gallop.   Pulmonary:      Effort: Pulmonary effort is normal. No respiratory distress.      Breath sounds: Normal breath sounds. No wheezing or rales.   Chest:      Chest wall: No tenderness.   Breasts: Breasts are symmetrical.       Abdominal:      General: Bowel sounds are normal. There is no distension.      Palpations: Abdomen is soft. There is no hepatomegaly or mass.      Tenderness: There is no abdominal tenderness. There is no guarding or rebound.   Musculoskeletal:         General: No tenderness. Normal range of motion.      Cervical back: Normal range of motion and neck supple.   Lymphadenopathy:      Cervical: No cervical adenopathy.   Skin:     General: Skin is warm and dry.      Coloration: Skin is not pale.      Findings: " No erythema or rash.   Neurological:      Mental Status: She is alert and oriented to person, place, and time.      Cranial Nerves: No cranial nerve deficit.      Coordination: Coordination normal.      Deep Tendon Reflexes: Reflexes normal.   Psychiatric:         Behavior: Behavior normal.         Thought Content: Thought content normal.         Judgment: Judgment normal.           Assessment:       1. Palpitations    2. Lightheadedness    3. Post-COVID-19 condition         Plan:     EKG and holter unremarkable    Possible post COVID symptoms    Suggested get a KARDIA device to monitor heart when symptomatic and send us the strips.   Patient advised to limit exposure to caffeine, stimulants, and alcohol    No orders of the defined types were placed in this encounter.    Follow up in about 1 year (around 6/9/2023).

## 2022-06-13 ENCOUNTER — PATIENT MESSAGE (OUTPATIENT)
Dept: OBSTETRICS AND GYNECOLOGY | Facility: CLINIC | Age: 22
End: 2022-06-13
Payer: COMMERCIAL

## 2022-06-15 ENCOUNTER — OFFICE VISIT (OUTPATIENT)
Dept: OBSTETRICS AND GYNECOLOGY | Facility: CLINIC | Age: 22
End: 2022-06-15
Attending: OBSTETRICS & GYNECOLOGY
Payer: COMMERCIAL

## 2022-06-15 VITALS — BODY MASS INDEX: 19.48 KG/M2 | HEIGHT: 65 IN | WEIGHT: 116.94 LBS

## 2022-06-15 DIAGNOSIS — N89.8 VAGINAL DISCHARGE: Primary | ICD-10-CM

## 2022-06-15 DIAGNOSIS — N64.4 BREAST PAIN, LEFT: ICD-10-CM

## 2022-06-15 PROCEDURE — 1159F MED LIST DOCD IN RCRD: CPT | Mod: CPTII,S$GLB,, | Performed by: OBSTETRICS & GYNECOLOGY

## 2022-06-15 PROCEDURE — 87481 CANDIDA DNA AMP PROBE: CPT | Mod: 59 | Performed by: OBSTETRICS & GYNECOLOGY

## 2022-06-15 PROCEDURE — 3008F PR BODY MASS INDEX (BMI) DOCUMENTED: ICD-10-PCS | Mod: CPTII,S$GLB,, | Performed by: OBSTETRICS & GYNECOLOGY

## 2022-06-15 PROCEDURE — 1160F RVW MEDS BY RX/DR IN RCRD: CPT | Mod: CPTII,S$GLB,, | Performed by: OBSTETRICS & GYNECOLOGY

## 2022-06-15 PROCEDURE — 3008F BODY MASS INDEX DOCD: CPT | Mod: CPTII,S$GLB,, | Performed by: OBSTETRICS & GYNECOLOGY

## 2022-06-15 PROCEDURE — 1160F PR REVIEW ALL MEDS BY PRESCRIBER/CLIN PHARMACIST DOCUMENTED: ICD-10-PCS | Mod: CPTII,S$GLB,, | Performed by: OBSTETRICS & GYNECOLOGY

## 2022-06-15 PROCEDURE — 99213 OFFICE O/P EST LOW 20 MIN: CPT | Mod: S$GLB,,, | Performed by: OBSTETRICS & GYNECOLOGY

## 2022-06-15 PROCEDURE — 1159F PR MEDICATION LIST DOCUMENTED IN MEDICAL RECORD: ICD-10-PCS | Mod: CPTII,S$GLB,, | Performed by: OBSTETRICS & GYNECOLOGY

## 2022-06-15 PROCEDURE — 99999 PR PBB SHADOW E&M-EST. PATIENT-LVL III: CPT | Mod: PBBFAC,,, | Performed by: OBSTETRICS & GYNECOLOGY

## 2022-06-15 PROCEDURE — 99213 PR OFFICE/OUTPT VISIT, EST, LEVL III, 20-29 MIN: ICD-10-PCS | Mod: S$GLB,,, | Performed by: OBSTETRICS & GYNECOLOGY

## 2022-06-15 PROCEDURE — 99999 PR PBB SHADOW E&M-EST. PATIENT-LVL III: ICD-10-PCS | Mod: PBBFAC,,, | Performed by: OBSTETRICS & GYNECOLOGY

## 2022-06-15 NOTE — PROGRESS NOTES
Subjective:       Patient ID: Inez Escobar is a 21 y.o. female.    Chief Complaint:  Vaginitis and Breast Pain        History of Present Illness  Inez Escobar is a 21 y.o. female  who presents for evaluation for vaginal discharge. Says the itching improved after the Diflucan but now with more discharge, yellow and an odor. Also reports left breast pain that she has had for awhile but the last month or so was worse than normal. Worse at night when she lays on her back or side. Hurts even without touching it. Does not wear bras often and if she does no underwire. Not much caffeine. Had imaging done before that showed extra tissue. No nipple discharge, no fever or chills. She does not feel anything.     Patient's last menstrual period was 2022 (lmp unknown).   Date of Last Pap: No result found    Review of Systems  Review of Systems   Constitutional: Negative for chills and fever.   Genitourinary: Positive for vaginal discharge.        Objective:   Physical Exam:   Constitutional: She is oriented to person, place, and time. Vital signs are normal. She appears well-developed and well-nourished. No distress.        Pulmonary/Chest: She exhibits no mass. Right breast exhibits no mass, no nipple discharge, no skin change, no tenderness, no bleeding and no swelling. Left breast exhibits no mass, no nipple discharge, no skin change, no tenderness, no bleeding and no swelling. Breasts are symmetrical.        Abdominal: Soft. Bowel sounds are normal. She exhibits no distension and no mass. There is no abdominal tenderness. There is no rebound.     Genitourinary:    Vagina and uterus normal.   There is no rash, tenderness, lesion or injury on the right labia. There is no rash, tenderness, lesion or injury on the left labia. Cervix is normal. Right adnexum displays no mass, no tenderness and no fullness. Left adnexum displays no mass, no tenderness and no fullness. No erythema,  no vaginal  discharge, tenderness, rectocele, cystocele or unspecified prolapse of vaginal walls in the vagina. Cervix exhibits no motion tenderness, no discharge and no friability. Uterus is not deviated, not enlarged, not fixed, not tender and not hosting fibroids.           Musculoskeletal: Normal range of motion and moves all extremeties.      Lymphadenopathy:        Right: No supraclavicular adenopathy present.        Left: No supraclavicular adenopathy present.    Neurological: She is alert and oriented to person, place, and time.    Skin: Skin is warm and dry.    Psychiatric: She has a normal mood and affect. Her behavior is normal. Judgment normal.        Assessment/ Plan:     1. Vaginal discharge  Vaginosis Screen by DNA Probe   2. Breast pain, left       Normal breast exam  Offered imaging, declines.   Follow up if symptoms worsen or fail to improve.    As of April 1, 2021, the Cures Act has been passed nationally. This new law requires that all doctors progress notes, lab results, pathology reports and radiology reports be released IMMEDIATELY to the patient in the patient portal. That means that the results are released to you at the EXACT same time they are released to me. Therefore, with all of the patients that I have I am not able to reply to each patient exactly when the results come in. So there will be a delay from when you see the results to when I see them and have time to come up with a response to send you. Also I only see these results when I am on the computer at work. So if the results come in over the weekend or after 5 pm of a work day, I will not see them until the next business day. As you can tell, this is a challenge as a physician to give every patient the quick response they hope for and deserve. So please be patient! Thanks for understanding, Dr. Storey

## 2022-06-17 LAB
BACTERIAL VAGINOSIS DNA: NEGATIVE
CANDIDA GLABRATA DNA: NEGATIVE
CANDIDA KRUSEI DNA: NEGATIVE
CANDIDA RRNA VAG QL PROBE: NEGATIVE
T VAGINALIS RRNA GENITAL QL PROBE: NEGATIVE

## 2022-06-29 ENCOUNTER — PATIENT MESSAGE (OUTPATIENT)
Dept: CARDIOLOGY | Facility: HOSPITAL | Age: 22
End: 2022-06-29
Payer: COMMERCIAL

## 2022-07-07 ENCOUNTER — PATIENT MESSAGE (OUTPATIENT)
Dept: INTERNAL MEDICINE | Facility: CLINIC | Age: 22
End: 2022-07-07
Payer: COMMERCIAL

## 2022-07-08 ENCOUNTER — PATIENT MESSAGE (OUTPATIENT)
Dept: INTERNAL MEDICINE | Facility: CLINIC | Age: 22
End: 2022-07-08
Payer: COMMERCIAL

## 2022-07-08 DIAGNOSIS — L60.0 INGROWN TOENAIL: Primary | ICD-10-CM

## 2022-07-14 ENCOUNTER — TELEPHONE (OUTPATIENT)
Dept: PODIATRY | Facility: CLINIC | Age: 22
End: 2022-07-14
Payer: COMMERCIAL

## 2022-07-14 NOTE — TELEPHONE ENCOUNTER
----- Message from Mamta Sahu sent at 7/14/2022  3:24 PM CDT -----  Regarding: RESCHEDULE APPT  Contact: Pt  Pt's requesting a call back regarding rescheduling appt..       Confirmed contact info below:  Contact Name: Inez Escobar  Phone Number: [phone]

## 2022-07-18 ENCOUNTER — TELEPHONE (OUTPATIENT)
Dept: PODIATRY | Facility: CLINIC | Age: 22
End: 2022-07-18
Payer: COMMERCIAL

## 2022-07-18 NOTE — TELEPHONE ENCOUNTER
called pt on 7/18/2022 spoke with pt to confirm today appt pt will not make it on time (at least 30 min away) and is will going to call back and reschedule appt    E.S.

## 2022-07-26 ENCOUNTER — PATIENT MESSAGE (OUTPATIENT)
Dept: OBSTETRICS AND GYNECOLOGY | Facility: CLINIC | Age: 22
End: 2022-07-26
Payer: COMMERCIAL

## 2022-07-26 RX ORDER — NITROFURANTOIN 25; 75 MG/1; MG/1
100 CAPSULE ORAL 2 TIMES DAILY
Qty: 14 CAPSULE | Refills: 0 | Status: SHIPPED | OUTPATIENT
Start: 2022-07-26 | End: 2022-08-02

## 2022-07-28 ENCOUNTER — OFFICE VISIT (OUTPATIENT)
Dept: PODIATRY | Facility: CLINIC | Age: 22
End: 2022-07-28
Payer: COMMERCIAL

## 2022-07-28 DIAGNOSIS — L60.0 INGROWN TOENAIL: ICD-10-CM

## 2022-07-28 PROCEDURE — 1159F MED LIST DOCD IN RCRD: CPT | Mod: CPTII,S$GLB,, | Performed by: STUDENT IN AN ORGANIZED HEALTH CARE EDUCATION/TRAINING PROGRAM

## 2022-07-28 PROCEDURE — 1159F PR MEDICATION LIST DOCUMENTED IN MEDICAL RECORD: ICD-10-PCS | Mod: CPTII,S$GLB,, | Performed by: STUDENT IN AN ORGANIZED HEALTH CARE EDUCATION/TRAINING PROGRAM

## 2022-07-28 PROCEDURE — 99203 OFFICE O/P NEW LOW 30 MIN: CPT | Mod: S$GLB,,, | Performed by: STUDENT IN AN ORGANIZED HEALTH CARE EDUCATION/TRAINING PROGRAM

## 2022-07-28 PROCEDURE — 99999 PR PBB SHADOW E&M-EST. PATIENT-LVL III: CPT | Mod: PBBFAC,,, | Performed by: STUDENT IN AN ORGANIZED HEALTH CARE EDUCATION/TRAINING PROGRAM

## 2022-07-28 PROCEDURE — 99203 PR OFFICE/OUTPT VISIT, NEW, LEVL III, 30-44 MIN: ICD-10-PCS | Mod: S$GLB,,, | Performed by: STUDENT IN AN ORGANIZED HEALTH CARE EDUCATION/TRAINING PROGRAM

## 2022-07-28 PROCEDURE — 1160F RVW MEDS BY RX/DR IN RCRD: CPT | Mod: CPTII,S$GLB,, | Performed by: STUDENT IN AN ORGANIZED HEALTH CARE EDUCATION/TRAINING PROGRAM

## 2022-07-28 PROCEDURE — 99999 PR PBB SHADOW E&M-EST. PATIENT-LVL III: ICD-10-PCS | Mod: PBBFAC,,, | Performed by: STUDENT IN AN ORGANIZED HEALTH CARE EDUCATION/TRAINING PROGRAM

## 2022-07-28 PROCEDURE — 1160F PR REVIEW ALL MEDS BY PRESCRIBER/CLIN PHARMACIST DOCUMENTED: ICD-10-PCS | Mod: CPTII,S$GLB,, | Performed by: STUDENT IN AN ORGANIZED HEALTH CARE EDUCATION/TRAINING PROGRAM

## 2022-07-28 NOTE — PROGRESS NOTES
Subjective:      Patient ID: Inez Escobar is a 21 y.o. female.    Chief Complaint: Nail Problem (Poss. Ingrown)    Inez is a 21 y.o. female who presents to the clinic with possible ingrowing toe nail. She reports some pain and redness about 2 weeks ago that resolved with antibiotics. She has never had an ingrown toe nail before and it isn't hurting now.     Review of Systems   All other systems reviewed and are negative.          Objective:      Physical Exam  Cardiovascular:      Pulses:           Dorsalis pedis pulses are 2+ on the right side.        Posterior tibial pulses are 2+ on the right side.   Feet:      Right foot:      Skin integrity: Skin integrity normal.      Toenail Condition: Right toenails are normal.               Assessment:       Encounter Diagnosis   Name Primary?    Ingrown toenail          Plan:       Inez was seen today for nail problem.    Diagnoses and all orders for this visit:    Ingrown toenail  -     Ambulatory referral/consult to Podiatry      I counseled the patient on her conditions, their implications and medical management.    No obvious ingrowing or pain on exam today.    Recommend monitoring the toe for now and follow up if ingrowns become worse or chronic.    Librado Ya DPM

## 2022-09-29 ENCOUNTER — PATIENT MESSAGE (OUTPATIENT)
Dept: OBSTETRICS AND GYNECOLOGY | Facility: CLINIC | Age: 22
End: 2022-09-29
Payer: COMMERCIAL

## 2022-09-29 RX ORDER — NITROFURANTOIN 25; 75 MG/1; MG/1
100 CAPSULE ORAL 2 TIMES DAILY
Qty: 14 CAPSULE | Refills: 0 | Status: SHIPPED | OUTPATIENT
Start: 2022-09-29 | End: 2022-10-06

## 2022-11-28 ENCOUNTER — PATIENT MESSAGE (OUTPATIENT)
Dept: INTERNAL MEDICINE | Facility: CLINIC | Age: 22
End: 2022-11-28
Payer: COMMERCIAL

## 2022-12-13 ENCOUNTER — PATIENT MESSAGE (OUTPATIENT)
Dept: INTERNAL MEDICINE | Facility: CLINIC | Age: 22
End: 2022-12-13
Payer: COMMERCIAL

## 2022-12-14 ENCOUNTER — PATIENT MESSAGE (OUTPATIENT)
Dept: OBSTETRICS AND GYNECOLOGY | Facility: CLINIC | Age: 22
End: 2022-12-14
Payer: COMMERCIAL

## 2022-12-22 ENCOUNTER — OFFICE VISIT (OUTPATIENT)
Dept: OBSTETRICS AND GYNECOLOGY | Facility: CLINIC | Age: 22
End: 2022-12-22
Payer: COMMERCIAL

## 2022-12-22 VITALS
WEIGHT: 118.63 LBS | DIASTOLIC BLOOD PRESSURE: 78 MMHG | SYSTOLIC BLOOD PRESSURE: 102 MMHG | HEIGHT: 65 IN | BODY MASS INDEX: 19.76 KG/M2

## 2022-12-22 DIAGNOSIS — N76.0 VULVOVAGINITIS: Primary | ICD-10-CM

## 2022-12-22 PROCEDURE — 3074F SYST BP LT 130 MM HG: CPT | Mod: CPTII,S$GLB,, | Performed by: STUDENT IN AN ORGANIZED HEALTH CARE EDUCATION/TRAINING PROGRAM

## 2022-12-22 PROCEDURE — 99213 OFFICE O/P EST LOW 20 MIN: CPT | Mod: S$GLB,,, | Performed by: STUDENT IN AN ORGANIZED HEALTH CARE EDUCATION/TRAINING PROGRAM

## 2022-12-22 PROCEDURE — 1159F PR MEDICATION LIST DOCUMENTED IN MEDICAL RECORD: ICD-10-PCS | Mod: CPTII,S$GLB,, | Performed by: STUDENT IN AN ORGANIZED HEALTH CARE EDUCATION/TRAINING PROGRAM

## 2022-12-22 PROCEDURE — 1160F RVW MEDS BY RX/DR IN RCRD: CPT | Mod: CPTII,S$GLB,, | Performed by: STUDENT IN AN ORGANIZED HEALTH CARE EDUCATION/TRAINING PROGRAM

## 2022-12-22 PROCEDURE — 3078F PR MOST RECENT DIASTOLIC BLOOD PRESSURE < 80 MM HG: ICD-10-PCS | Mod: CPTII,S$GLB,, | Performed by: STUDENT IN AN ORGANIZED HEALTH CARE EDUCATION/TRAINING PROGRAM

## 2022-12-22 PROCEDURE — 99213 PR OFFICE/OUTPT VISIT, EST, LEVL III, 20-29 MIN: ICD-10-PCS | Mod: S$GLB,,, | Performed by: STUDENT IN AN ORGANIZED HEALTH CARE EDUCATION/TRAINING PROGRAM

## 2022-12-22 PROCEDURE — 99999 PR PBB SHADOW E&M-EST. PATIENT-LVL III: CPT | Mod: PBBFAC,,, | Performed by: STUDENT IN AN ORGANIZED HEALTH CARE EDUCATION/TRAINING PROGRAM

## 2022-12-22 PROCEDURE — 3078F DIAST BP <80 MM HG: CPT | Mod: CPTII,S$GLB,, | Performed by: STUDENT IN AN ORGANIZED HEALTH CARE EDUCATION/TRAINING PROGRAM

## 2022-12-22 PROCEDURE — 3008F PR BODY MASS INDEX (BMI) DOCUMENTED: ICD-10-PCS | Mod: CPTII,S$GLB,, | Performed by: STUDENT IN AN ORGANIZED HEALTH CARE EDUCATION/TRAINING PROGRAM

## 2022-12-22 PROCEDURE — 81514 NFCT DS BV&VAGINITIS DNA ALG: CPT | Performed by: STUDENT IN AN ORGANIZED HEALTH CARE EDUCATION/TRAINING PROGRAM

## 2022-12-22 PROCEDURE — 87591 N.GONORRHOEAE DNA AMP PROB: CPT | Performed by: STUDENT IN AN ORGANIZED HEALTH CARE EDUCATION/TRAINING PROGRAM

## 2022-12-22 PROCEDURE — 99999 PR PBB SHADOW E&M-EST. PATIENT-LVL III: ICD-10-PCS | Mod: PBBFAC,,, | Performed by: STUDENT IN AN ORGANIZED HEALTH CARE EDUCATION/TRAINING PROGRAM

## 2022-12-22 PROCEDURE — 3074F PR MOST RECENT SYSTOLIC BLOOD PRESSURE < 130 MM HG: ICD-10-PCS | Mod: CPTII,S$GLB,, | Performed by: STUDENT IN AN ORGANIZED HEALTH CARE EDUCATION/TRAINING PROGRAM

## 2022-12-22 PROCEDURE — 1159F MED LIST DOCD IN RCRD: CPT | Mod: CPTII,S$GLB,, | Performed by: STUDENT IN AN ORGANIZED HEALTH CARE EDUCATION/TRAINING PROGRAM

## 2022-12-22 PROCEDURE — 87491 CHLMYD TRACH DNA AMP PROBE: CPT | Performed by: STUDENT IN AN ORGANIZED HEALTH CARE EDUCATION/TRAINING PROGRAM

## 2022-12-22 PROCEDURE — 3008F BODY MASS INDEX DOCD: CPT | Mod: CPTII,S$GLB,, | Performed by: STUDENT IN AN ORGANIZED HEALTH CARE EDUCATION/TRAINING PROGRAM

## 2022-12-22 PROCEDURE — 1160F PR REVIEW ALL MEDS BY PRESCRIBER/CLIN PHARMACIST DOCUMENTED: ICD-10-PCS | Mod: CPTII,S$GLB,, | Performed by: STUDENT IN AN ORGANIZED HEALTH CARE EDUCATION/TRAINING PROGRAM

## 2022-12-22 RX ORDER — CLOTRIMAZOLE AND BETAMETHASONE DIPROPIONATE 10; .64 MG/G; MG/G
CREAM TOPICAL
Qty: 15 G | Refills: 0 | Status: SHIPPED | OUTPATIENT
Start: 2022-12-22 | End: 2023-03-27 | Stop reason: ALTCHOICE

## 2022-12-22 RX ORDER — FLUCONAZOLE 150 MG/1
150 TABLET ORAL ONCE
Qty: 2 TABLET | Refills: 0 | Status: SHIPPED | OUTPATIENT
Start: 2022-12-22 | End: 2022-12-22

## 2022-12-22 NOTE — PROGRESS NOTES
[facility]ICS AND GYNECOLOGY    Subjective:      Chief Complaint:  Itch    HPI:  Inez Escobar is an 22 y.o. presenting with concerns of itch.   Feels this started a week ago. Feels like a yeast infection. No rash. Itch external and internal. Endorses prior episodes. Monistat helped for a little, but then symptoms returned.  Associated thicker discharge. Different odor too, not necessarily foul though. LMP 12/7/22. Recent intercourse during week after menses - no. No recent antibiotics. No new partners. No new soap, detergent, laundry, products. History of prior yeast infections. Patient feels they occur somewhat often.     Review of systems:  Denies fever/chills, nausea/vomiting, abnormal vaginal bleeding, or dysuria.     OB History   No obstetric history on file.     Past Medical History:   Diagnosis Date    Anxiety     Bipolar 1 disorder     Depression      History reviewed. No pertinent surgical history.  Family History   Problem Relation Age of Onset    Migraines Mother     Depression Mother     Mental illness Mother     Miscarriages / Stillbirths Mother     Diabetes Father     Alcohol abuse Father     Depression Father     Drug abuse Father     Heart disease Father     Mental illness Father     No Known Problems Sister     No Known Problems Brother     Arrhythmia Maternal Grandmother         A flutter    Congenital heart disease Maternal Grandmother     Diabetes Maternal Grandmother     Depression Maternal Grandmother     Heart disease Maternal Grandmother     Mental illness Maternal Grandmother     Emphysema Maternal Grandfather     Multiple sclerosis Paternal Grandmother     Diabetes Paternal Grandmother     Alcohol abuse Paternal Grandmother     Drug abuse Paternal Grandmother     No Known Problems Paternal Grandfather     Drug abuse Maternal Aunt     Mental illness Maternal Aunt     Mental illness Maternal Aunt     Heart attacks under age 50 Neg Hx     Early death Neg Hx     Pacemaker/defibrilator  "Neg Hx     Breast cancer Neg Hx     Colon cancer Neg Hx     Ovarian cancer Neg Hx        Allergies: Review of patient's allergies indicates:  No Known Allergies    Medications:   Current Outpatient Medications   Medication Sig Dispense Refill    EScitalopram oxalate (LEXAPRO) 20 MG tablet Take 20 mg by mouth once daily.      lamoTRIgine (LAMICTAL) 200 MG tablet Take 200 mg by mouth once daily.      valACYclovir (VALTREX) 500 MG tablet Take 500 mg by mouth 2 (two) times daily.      clotrimazole-betamethasone 1-0.05% (LOTRISONE) cream Apply to affected area 2 times daily, external use only. Do not use for more than 7 days in a row. 15 g 0    fluconazole (DIFLUCAN) 150 MG Tab Take 1 tablet (150 mg total) by mouth once. If after 3 days symptoms continue, can take an additional dose (one 150mg pill). for 1 dose 2 tablet 0     Current Facility-Administered Medications   Medication Dose Route Frequency Provider Last Rate Last Admin    levonorgestreL 20 mcg/24 hours (5 yrs) 52 mg IUD 1 Intra Uterine Device  1 Intra Uterine Device Intrauterine  Karie Storey MD   1 Intra Uterine Device at 09/23/20 1415       Social History     Tobacco Use    Smoking status: Never    Smokeless tobacco: Never   Substance Use Topics    Alcohol use: No       Objective:   /78   Ht 5' 5" (1.651 m)   Wt 53.8 kg (118 lb 9.7 oz)   LMP 12/07/2022 (Exact Date)   BMI 19.74 kg/m²   Physical Exam    GENERAL: Alert, well dressed, well nourished. Appropriate mood and affect. Pleasant.  HEENT: Normocephalic, atraumatic. Extraocular movements intact. Hearing and vision grossly intact.   PULMONARY: No respiratory distress. No use of accessory muscles of respiration. No cyanosis. Speaking comfortably in full sentences.   CARDIAC: Well perfused.    EXTREMITIES: No edema. No visible rashes.     PELVIC:   EXTERNAL GENITALIA: No lesions or masses, non-erythematous.  URETHRA: Patent, no discharge.   VAGINA: Pink, moist, rugated, no discharge.   Swabs " obtained only, no speculum exam at today's visit.    Assessment/Plan:     Problem List Items Addressed This Visit    None  Visit Diagnoses       Vulvovaginitis    -  Primary    Relevant Medications    fluconazole (DIFLUCAN) 150 MG Tab    clotrimazole-betamethasone 1-0.05% (LOTRISONE) cream    Other Relevant Orders    Vaginosis Screen by DNA Probe    C. trachomatis/N. gonorrhoeae by AMP DNA [facility]; Cervicovaginal        - Ddx:  yeast, BV, allergic reaction, infection, cystitis, urethritis, VV fistula  - VSS  - Exam as above  - Affirm, GCCT discussed, obtained today  - Lotrizone, diflucan rx provided today  - Consider maintenance course diflucan after acute episode tx  - Follow up results, for plan/treatment course  - Patient of Dr. Storey - schedule annual WWE          As of April 1, 2021, the Cures Act has been passed nationally. This new law requires that all doctors progress notes, lab results, pathology reports and radiology reports be released IMMEDIATELY to the patient in the patient portal. That means that the results are released to you at the EXACT same time they are released to me. Therefore, with all of the patients that I have I am not able to reply to each patient exactly when the results come in. So there will be a delay from when you see the results to when I see them and have time to come up with a response to send you. Also I only see these results when I am on the computer at work. So if the results come in over the weekend or after 5 pm of a work day, I will not see them until the next business day. As you can tell, this is a challenge as a physician to give every patient the quick response they hope for and deserve. So please be patient!   Thanks for your understanding and patience.

## 2022-12-23 ENCOUNTER — PATIENT MESSAGE (OUTPATIENT)
Dept: OBSTETRICS AND GYNECOLOGY | Facility: CLINIC | Age: 22
End: 2022-12-23
Payer: COMMERCIAL

## 2022-12-23 DIAGNOSIS — N76.0 VULVOVAGINITIS: Primary | ICD-10-CM

## 2022-12-23 LAB
BACTERIAL VAGINOSIS DNA: NEGATIVE
C TRACH DNA SPEC QL NAA+PROBE: NOT DETECTED
CANDIDA GLABRATA DNA: NEGATIVE
CANDIDA KRUSEI DNA: NEGATIVE
CANDIDA RRNA VAG QL PROBE: POSITIVE
N GONORRHOEA DNA SPEC QL NAA+PROBE: NOT DETECTED
T VAGINALIS RRNA GENITAL QL PROBE: NEGATIVE

## 2022-12-28 ENCOUNTER — PATIENT MESSAGE (OUTPATIENT)
Dept: OBSTETRICS AND GYNECOLOGY | Facility: CLINIC | Age: 22
End: 2022-12-28
Payer: COMMERCIAL

## 2022-12-28 RX ORDER — FLUCONAZOLE 150 MG/1
150 TABLET ORAL WEEKLY
Qty: 12 TABLET | Refills: 0 | Status: SHIPPED | OUTPATIENT
Start: 2022-12-28 | End: 2023-03-27

## 2023-01-09 ENCOUNTER — PATIENT MESSAGE (OUTPATIENT)
Dept: INTERNAL MEDICINE | Facility: CLINIC | Age: 23
End: 2023-01-09
Payer: COMMERCIAL

## 2023-01-09 DIAGNOSIS — F33.0 MILD RECURRENT MAJOR DEPRESSION: ICD-10-CM

## 2023-01-09 DIAGNOSIS — Z00.00 ANNUAL PHYSICAL EXAM: Primary | ICD-10-CM

## 2023-01-09 DIAGNOSIS — R68.82 DECREASED LIBIDO: ICD-10-CM

## 2023-01-09 DIAGNOSIS — N92.6 IRREGULAR MENSTRUAL CYCLE: ICD-10-CM

## 2023-01-10 ENCOUNTER — LAB VISIT (OUTPATIENT)
Dept: LAB | Facility: HOSPITAL | Age: 23
End: 2023-01-10
Attending: INTERNAL MEDICINE
Payer: COMMERCIAL

## 2023-01-10 DIAGNOSIS — F33.0 MILD RECURRENT MAJOR DEPRESSION: ICD-10-CM

## 2023-01-10 DIAGNOSIS — N92.6 IRREGULAR MENSTRUAL CYCLE: ICD-10-CM

## 2023-01-10 DIAGNOSIS — R68.82 DECREASED LIBIDO: ICD-10-CM

## 2023-01-10 DIAGNOSIS — Z00.00 ANNUAL PHYSICAL EXAM: ICD-10-CM

## 2023-01-10 LAB
ALBUMIN SERPL BCP-MCNC: 3.8 G/DL (ref 3.5–5.2)
ALP SERPL-CCNC: 68 U/L (ref 55–135)
ALT SERPL W/O P-5'-P-CCNC: 13 U/L (ref 10–44)
ANION GAP SERPL CALC-SCNC: 7 MMOL/L (ref 8–16)
AST SERPL-CCNC: 17 U/L (ref 10–40)
BASOPHILS # BLD AUTO: 0.05 K/UL (ref 0–0.2)
BASOPHILS NFR BLD: 0.9 % (ref 0–1.9)
BILIRUB SERPL-MCNC: 0.7 MG/DL (ref 0.1–1)
BUN SERPL-MCNC: 14 MG/DL (ref 6–20)
CALCIUM SERPL-MCNC: 9.1 MG/DL (ref 8.7–10.5)
CHLORIDE SERPL-SCNC: 108 MMOL/L (ref 95–110)
CHOLEST SERPL-MCNC: 163 MG/DL (ref 120–199)
CHOLEST/HDLC SERPL: 3 {RATIO} (ref 2–5)
CO2 SERPL-SCNC: 23 MMOL/L (ref 23–29)
CREAT SERPL-MCNC: 0.8 MG/DL (ref 0.5–1.4)
DIFFERENTIAL METHOD: NORMAL
EOSINOPHIL # BLD AUTO: 0.1 K/UL (ref 0–0.5)
EOSINOPHIL NFR BLD: 2.6 % (ref 0–8)
ERYTHROCYTE [DISTWIDTH] IN BLOOD BY AUTOMATED COUNT: 12.2 % (ref 11.5–14.5)
EST. GFR  (NO RACE VARIABLE): >60 ML/MIN/1.73 M^2
ESTRADIOL SERPL-MCNC: 18 PG/ML
FSH SERPL-ACNC: 4.01 MIU/ML
GLUCOSE SERPL-MCNC: 91 MG/DL (ref 70–110)
HCT VFR BLD AUTO: 38.9 % (ref 37–48.5)
HDLC SERPL-MCNC: 54 MG/DL (ref 40–75)
HDLC SERPL: 33.1 % (ref 20–50)
HGB BLD-MCNC: 13.6 G/DL (ref 12–16)
IMM GRANULOCYTES # BLD AUTO: 0.01 K/UL (ref 0–0.04)
IMM GRANULOCYTES NFR BLD AUTO: 0.2 % (ref 0–0.5)
LDLC SERPL CALC-MCNC: 97 MG/DL (ref 63–159)
LH SERPL-ACNC: 2.8 MIU/ML
LYMPHOCYTES # BLD AUTO: 2.1 K/UL (ref 1–4.8)
LYMPHOCYTES NFR BLD: 39.5 % (ref 18–48)
MCH RBC QN AUTO: 30.8 PG (ref 27–31)
MCHC RBC AUTO-ENTMCNC: 35 G/DL (ref 32–36)
MCV RBC AUTO: 88 FL (ref 82–98)
MONOCYTES # BLD AUTO: 0.7 K/UL (ref 0.3–1)
MONOCYTES NFR BLD: 12.2 % (ref 4–15)
NEUTROPHILS # BLD AUTO: 2.4 K/UL (ref 1.8–7.7)
NEUTROPHILS NFR BLD: 44.6 % (ref 38–73)
NONHDLC SERPL-MCNC: 109 MG/DL
NRBC BLD-RTO: 0 /100 WBC
PLATELET # BLD AUTO: 271 K/UL (ref 150–450)
PMV BLD AUTO: 10.5 FL (ref 9.2–12.9)
POTASSIUM SERPL-SCNC: 4.4 MMOL/L (ref 3.5–5.1)
PROGEST SERPL-MCNC: 0.5 NG/ML
PROT SERPL-MCNC: 6.8 G/DL (ref 6–8.4)
RBC # BLD AUTO: 4.41 M/UL (ref 4–5.4)
SODIUM SERPL-SCNC: 138 MMOL/L (ref 136–145)
T4 FREE SERPL-MCNC: 0.9 NG/DL (ref 0.71–1.51)
TESTOST SERPL-MCNC: 28 NG/DL (ref 5–73)
TRIGL SERPL-MCNC: 60 MG/DL (ref 30–150)
TSH SERPL DL<=0.005 MIU/L-ACNC: 1.43 UIU/ML (ref 0.4–4)
WBC # BLD AUTO: 5.31 K/UL (ref 3.9–12.7)

## 2023-01-10 PROCEDURE — 80061 LIPID PANEL: CPT | Performed by: INTERNAL MEDICINE

## 2023-01-10 PROCEDURE — 85025 COMPLETE CBC W/AUTO DIFF WBC: CPT | Performed by: INTERNAL MEDICINE

## 2023-01-10 PROCEDURE — 82670 ASSAY OF TOTAL ESTRADIOL: CPT | Performed by: INTERNAL MEDICINE

## 2023-01-10 PROCEDURE — 36415 COLL VENOUS BLD VENIPUNCTURE: CPT | Performed by: INTERNAL MEDICINE

## 2023-01-10 PROCEDURE — 83001 ASSAY OF GONADOTROPIN (FSH): CPT | Performed by: INTERNAL MEDICINE

## 2023-01-10 PROCEDURE — 84144 ASSAY OF PROGESTERONE: CPT | Performed by: INTERNAL MEDICINE

## 2023-01-10 PROCEDURE — 83002 ASSAY OF GONADOTROPIN (LH): CPT | Performed by: INTERNAL MEDICINE

## 2023-01-10 PROCEDURE — 80053 COMPREHEN METABOLIC PANEL: CPT | Performed by: INTERNAL MEDICINE

## 2023-01-10 PROCEDURE — 84443 ASSAY THYROID STIM HORMONE: CPT | Performed by: INTERNAL MEDICINE

## 2023-01-10 PROCEDURE — 84403 ASSAY OF TOTAL TESTOSTERONE: CPT | Performed by: INTERNAL MEDICINE

## 2023-01-10 PROCEDURE — 84439 ASSAY OF FREE THYROXINE: CPT | Performed by: INTERNAL MEDICINE

## 2023-01-12 ENCOUNTER — OFFICE VISIT (OUTPATIENT)
Dept: INTERNAL MEDICINE | Facility: CLINIC | Age: 23
End: 2023-01-12
Payer: COMMERCIAL

## 2023-01-12 ENCOUNTER — PATIENT MESSAGE (OUTPATIENT)
Dept: OBSTETRICS AND GYNECOLOGY | Facility: CLINIC | Age: 23
End: 2023-01-12
Payer: COMMERCIAL

## 2023-01-12 DIAGNOSIS — F41.9 ANXIETY: Primary | ICD-10-CM

## 2023-01-12 DIAGNOSIS — F31.9 BIPOLAR AFFECTIVE DISORDER, REMISSION STATUS UNSPECIFIED: ICD-10-CM

## 2023-01-12 PROCEDURE — 99214 PR OFFICE/OUTPT VISIT, EST, LEVL IV, 30-39 MIN: ICD-10-PCS | Mod: 95,,, | Performed by: INTERNAL MEDICINE

## 2023-01-12 PROCEDURE — 99214 OFFICE O/P EST MOD 30 MIN: CPT | Mod: 95,,, | Performed by: INTERNAL MEDICINE

## 2023-01-12 RX ORDER — BUSPIRONE HYDROCHLORIDE 10 MG/1
10 TABLET ORAL 2 TIMES DAILY PRN
Qty: 60 TABLET | Refills: 0 | Status: SHIPPED | OUTPATIENT
Start: 2023-01-12 | End: 2023-03-02 | Stop reason: SDUPTHER

## 2023-01-12 NOTE — PROGRESS NOTES
[facility] Virtual Visit Note    The patient location is: [place]  The chief complaint leading to consultation is: mood swings and decreased libido  Visit type: Virtual visit with synchronous audio and video  Total time spent with patient: 20 min  Each patient to whom he or she provides medical services by telemedicine is:  (1) informed of the relationship between the physician and patient and the respective role of any other health care provider with respect to management of the patient; and (2) notified that he or she may decline to receive medical services by telemedicine and may withdraw from such care at any time.      Chief Complaint    No chief complaint on file.      History of Present Illness      Inez Escobar is a 22 y.o. female with chronic conditions of anxiety, depression who presents today for: complaints of irritability, mood swings and decreased libido.  Pt has been following with psychiatrist, Dr. Blackwell, for years but her patient-provider relationship has deteriorated recently.  Pt reports being on prozac and lamotrigine initially but changing from prozac to lexapro in recent past.  In the past few months, pt reports not feeling herself and wanted to try discontinuing lexapro, continuing lamictal.  After discontinuing a few weeks ago, pt noticed worsened irritability and mood swings, decreased libido but feels like she's managing her anxiety ok.  Dr. Blackwell is recommending she get back on fluoxetine after discontinuing lexapro but pt wants to wait.  She feels torn between listening to her psychiatrist who does not appear to be listening to her preferences, per pt.  She has appt to establish with new psychiatrist in 3/2023 (earliest availability) for a fresh perspective.      Pt requested labwork to identify any secondary causes of her symptoms.  Labs obtained and were unremarkable.    Past Medical History:  Past Medical History:   Diagnosis Date    Anxiety     Bipolar 1  disorder     Depression        Past Surgical History:   has no past surgical history on file.    Family History:  family history includes Alcohol abuse in her father and paternal grandmother; Arrhythmia in her maternal grandmother; Congenital heart disease in her maternal grandmother; Depression in her father, maternal grandmother, and mother; Diabetes in her father, maternal grandmother, and paternal grandmother; Drug abuse in her father, maternal aunt, and paternal grandmother; Emphysema in her maternal grandfather; Heart disease in her father and maternal grandmother; Mental illness in her father, maternal aunt, maternal aunt, maternal grandmother, and mother; Migraines in her mother; Miscarriages / Stillbirths in her mother; Multiple sclerosis in her paternal grandmother; No Known Problems in her brother, paternal grandfather, and sister.     Social History:  Social History     Tobacco Use    Smoking status: Never    Smokeless tobacco: Never   Substance Use Topics    Alcohol use: No    Drug use: Never       Review of Systems   Constitutional:  Negative for chills, fever and malaise/fatigue.   HENT:  Negative for hearing loss.    Eyes:  Negative for discharge.   Respiratory:  Negative for shortness of breath and wheezing.    Cardiovascular:  Negative for chest pain and palpitations.   Gastrointestinal:  Positive for constipation. Negative for blood in stool, diarrhea, nausea and vomiting.   Genitourinary:  Negative for dysuria and hematuria.   Musculoskeletal:  Negative for neck pain.   Skin:  Negative for rash.   Neurological:  Positive for headaches. Negative for weakness.   Endo/Heme/Allergies:  Negative for polydipsia.   All other systems reviewed and are negative.     Medications:  Outpatient Encounter Medications as of 1/12/2023   Medication Sig Dispense Refill    busPIRone (BUSPAR) 10 MG tablet Take 1 tablet (10 mg total) by mouth 2 (two) times daily as needed (anxiety). 60 tablet 0     clotrimazole-betamethasone 1-0.05% (LOTRISONE) cream Apply to affected area 2 times daily, external use only. Do not use for more than 7 days in a row. 15 g 0    EScitalopram oxalate (LEXAPRO) 20 MG tablet Take 20 mg by mouth once daily.      fluconazole (DIFLUCAN) 150 MG Tab Take 1 tablet (150 mg total) by mouth once a week. Do not take more than 1 pill each week. For 3 months total. for 12 doses 12 tablet 0    lamoTRIgine (LAMICTAL) 200 MG tablet Take 200 mg by mouth once daily.      valACYclovir (VALTREX) 500 MG tablet Take 500 mg by mouth 2 (two) times daily.       Facility-Administered Encounter Medications as of 1/12/2023   Medication Dose Route Frequency Provider Last Rate Last Admin    levonorgestreL 20 mcg/24 hours (5 yrs) 52 mg IUD 1 Intra Uterine Device  1 Intra Uterine Device Intrauterine  Karie Storey MD   1 Intra Uterine Device at 09/23/20 6505       Allergies:  Review of patient's allergies indicates:  No Known Allergies    Health Maintenance:  Immunization History   Administered Date(s) Administered    DTaP 01/21/2003, 01/21/2003, 05/05/2003, 05/05/2003, 08/05/2003, 08/05/2003, 09/09/2004, 09/09/2004, 02/11/2008, 02/11/2008    HPV 9-Valent 08/01/2019, 08/01/2019, 09/02/2020, 09/02/2020, 06/14/2021    Hep B / HiB 2002, 2002, 03/13/2003, 03/13/2003, 11/17/2003, 11/17/2003    Hepatitis A, Pediatric/Adolescent, 2 Dose 08/01/2019, 08/01/2019, 09/04/2020    IPV 01/21/2003, 01/21/2003, 05/05/2003, 05/05/2003, 02/06/2004, 02/06/2004, 09/09/2004, 09/09/2004, 02/05/2009, 02/05/2009    MMR 02/06/2004, 02/06/2004, 02/11/2008, 02/11/2008    Meningococcal B, OMV 09/04/2020, 06/14/2021    Meningococcal Conjugate (MCV4P) 06/25/2014, 06/25/2014, 08/01/2019, 08/01/2019    Pneumococcal Conjugate - 7 Valent 2002, 2002, 03/13/2003, 03/13/2003, 07/23/2003, 07/23/2003, 09/09/2004, 09/09/2004    Tdap 06/25/2014, 06/25/2014    Varicella 11/17/2003, 11/17/2003, 02/11/2008, 02/11/2008      Health  Maintenance   Topic Date Due    Hepatitis C Screening  Never done    Chlamydia Screening  12/22/2023    TETANUS VACCINE  06/25/2024    Lipid Panel  Completed    HPV Vaccines  Completed        Physical Exam       ]    Physical Exam  Constitutional:       General: She is not in acute distress.     Appearance: She is well-developed. She is not diaphoretic.   Eyes:      General: No scleral icterus.        Right eye: No discharge.         Left eye: No discharge.      Conjunctiva/sclera: Conjunctivae normal.   Pulmonary:      Effort: Pulmonary effort is normal. No respiratory distress.   Neurological:      Mental Status: She is alert and oriented to person, place, and time.   Psychiatric:         Mood and Affect: Mood normal.         Behavior: Behavior normal.         Thought Content: Thought content normal.         Judgment: Judgment normal.        Laboratory:  CBC:  Recent Labs   Lab 05/12/22  1003 01/10/23  0815   WBC 4.87 5.31   RBC 4.33 4.41   Hemoglobin 13.2 13.6   Hematocrit 39.4 38.9   Platelets 306 271   MCV 91 88   MCH 30.5 30.8   MCHC 33.5 35.0     CMP:  Recent Labs   Lab 05/12/22  1003 01/10/23  0815   Glucose 88 91   Calcium 9.7 9.1   Albumin 3.9 3.8   Total Protein 7.0 6.8   Sodium 137 138   Potassium 4.1 4.4   CO2 25 23   Chloride 104 108   BUN 10 14   Alkaline Phosphatase 71 68   ALT 33 13   AST 33 17   Total Bilirubin 0.5 0.7     URINALYSIS:       LIPIDS:  Recent Labs   Lab 05/12/22  1003 01/10/23  0815   TSH 0.778 1.427   HDL  --  54   Cholesterol  --  163   Triglycerides  --  60   LDL Cholesterol  --  97.0   HDL/Cholesterol Ratio  --  33.1   Non-HDL Cholesterol  --  109   Total Cholesterol/HDL Ratio  --  3.0     TSH:  Recent Labs   Lab 05/12/22  1003 01/10/23  0815   TSH 0.778 1.427     A1C:        Assessment/Plan     Inez Escobar is a 22 y.o.female with:    1. Anxiety  - busPIRone (BUSPAR) 10 MG tablet; Take 1 tablet (10 mg total) by mouth 2 (two) times daily as needed (anxiety).  Dispense:  60 tablet; Refill: 0    2. Bipolar affective disorder, remission status unspecified  I encouraged pt to continue to advocate for her preferences re: medication regimen.  I suspect she needs more time to equilibrate off lexapro.  Recommend she continue lamictal as prescribed.  Sent in buspirone to try as needed for days of heavy anxiety/irritability.  Will help manage until she can be seen by new psychiatrist.       Future Appointments   Date Time Provider Department Center   3/23/2023 10:00 AM Karie Storey MD [facility] [place]   3/27/2023  8:00 AM Qing Samson NP [facility] [place] [facility]       Chepe Cheek MD  [facility]                Answers submitted by the patient for this visit:  Review of Systems Questionnaire (Submitted on 1/12/2023)  activity change: No  unexpected weight change: No  rhinorrhea: No  trouble swallowing: No  visual disturbance: No  chest tightness: Yes  polyuria: No  difficulty urinating: No  menstrual problem: No  joint swelling: No  arthralgias: No  confusion: No  dysphoric mood: Yes

## 2023-01-29 ENCOUNTER — PATIENT MESSAGE (OUTPATIENT)
Dept: OBSTETRICS AND GYNECOLOGY | Facility: CLINIC | Age: 23
End: 2023-01-29
Payer: COMMERCIAL

## 2023-02-01 ENCOUNTER — OFFICE VISIT (OUTPATIENT)
Dept: OBSTETRICS AND GYNECOLOGY | Facility: CLINIC | Age: 23
End: 2023-02-01
Payer: COMMERCIAL

## 2023-02-01 VITALS
BODY MASS INDEX: 20.25 KG/M2 | WEIGHT: 121.56 LBS | SYSTOLIC BLOOD PRESSURE: 102 MMHG | DIASTOLIC BLOOD PRESSURE: 72 MMHG | HEIGHT: 65 IN

## 2023-02-01 DIAGNOSIS — N76.0 VULVOVAGINITIS: Primary | ICD-10-CM

## 2023-02-01 PROCEDURE — 3008F PR BODY MASS INDEX (BMI) DOCUMENTED: ICD-10-PCS | Mod: CPTII,S$GLB,,

## 2023-02-01 PROCEDURE — 99214 PR OFFICE/OUTPT VISIT, EST, LEVL IV, 30-39 MIN: ICD-10-PCS | Mod: S$GLB,,,

## 2023-02-01 PROCEDURE — 3078F PR MOST RECENT DIASTOLIC BLOOD PRESSURE < 80 MM HG: ICD-10-PCS | Mod: CPTII,S$GLB,,

## 2023-02-01 PROCEDURE — 81514 NFCT DS BV&VAGINITIS DNA ALG: CPT

## 2023-02-01 PROCEDURE — 87798 DETECT AGENT NOS DNA AMP: CPT

## 2023-02-01 PROCEDURE — 1159F PR MEDICATION LIST DOCUMENTED IN MEDICAL RECORD: ICD-10-PCS | Mod: CPTII,S$GLB,,

## 2023-02-01 PROCEDURE — 3074F SYST BP LT 130 MM HG: CPT | Mod: CPTII,S$GLB,,

## 2023-02-01 PROCEDURE — 3078F DIAST BP <80 MM HG: CPT | Mod: CPTII,S$GLB,,

## 2023-02-01 PROCEDURE — 99999 PR PBB SHADOW E&M-EST. PATIENT-LVL III: ICD-10-PCS | Mod: PBBFAC,,,

## 2023-02-01 PROCEDURE — 99999 PR PBB SHADOW E&M-EST. PATIENT-LVL III: CPT | Mod: PBBFAC,,,

## 2023-02-01 PROCEDURE — 87563 M. GENITALIUM AMP PROBE: CPT

## 2023-02-01 PROCEDURE — 99214 OFFICE O/P EST MOD 30 MIN: CPT | Mod: S$GLB,,,

## 2023-02-01 PROCEDURE — 3074F PR MOST RECENT SYSTOLIC BLOOD PRESSURE < 130 MM HG: ICD-10-PCS | Mod: CPTII,S$GLB,,

## 2023-02-01 PROCEDURE — 3008F BODY MASS INDEX DOCD: CPT | Mod: CPTII,S$GLB,,

## 2023-02-01 PROCEDURE — 1159F MED LIST DOCD IN RCRD: CPT | Mod: CPTII,S$GLB,,

## 2023-02-01 RX ORDER — ONDANSETRON HYDROCHLORIDE 8 MG/1
TABLET, FILM COATED ORAL
COMMUNITY
Start: 2023-01-19

## 2023-02-01 RX ORDER — FLUOXETINE HYDROCHLORIDE 20 MG/1
20 CAPSULE ORAL
COMMUNITY
Start: 2023-01-12 | End: 2023-03-27 | Stop reason: ALTCHOICE

## 2023-02-01 RX ORDER — METHYLPREDNISOLONE 4 MG/1
TABLET ORAL
COMMUNITY
Start: 2022-12-25 | End: 2023-03-27 | Stop reason: ALTCHOICE

## 2023-02-01 RX ORDER — AMOXICILLIN AND CLAVULANATE POTASSIUM 875; 125 MG/1; MG/1
1 TABLET, FILM COATED ORAL 2 TIMES DAILY
COMMUNITY
Start: 2022-12-25 | End: 2023-03-27 | Stop reason: ALTCHOICE

## 2023-02-01 NOTE — PROGRESS NOTES
"  Chief Complaint: Vaginal Discharge     HPI:      Inez Escobar is a 22 y.o.  who presents with complaint of vaginal discharge for the last couple of months.  She was seen in December for a yeast infection and treated with Diflucan. Her symptoms reoccurred and she was started on once weekly Diflucan.  This seemed to work for a few weeks until just recently.  In the past she has been treated for Ureaplasma and had similar symptoms as today.  She endorses internal and external itching.  Discharge is thick yellow and odorous.  She is currently sexually active with a single male partner.  She is using Mirena inserted for contraception. Does not use condoms. Some pain/discomfort with sex.  Recent STD testing in December negative. She has experienced symptoms like this before. Patient does not douche. Patient's last menstrual period was 01/12/2023 (exact date).  Periods are normal and regular lasting about 7 days.     Denies fever, chills, abdominal pain, cramping, n/v, constipation, diarrhea, abnormal vaginal bleeding, dysuria, or recent illnesses.    ROS:   GENERAL: Denies weight gain or weight loss. Feeling well overall.   SKIN: Denies rash or lesions.   ABDOMEN: Denies abdominal pain, constipation, diarrhea, nausea, vomiting, change in appetite or rectal bleeding.   URINARY: Denies frequency, dysuria, hematuria.  GYN: See HPI.    Physical Exam:      PHYSICAL EXAM:   Vitals:    02/01/23 1007   BP: 102/72   Weight: 55.2 kg (121 lb 9.3 oz)   Height: 5' 5" (1.651 m)   PainSc: 0-No pain     Body mass index is 20.23 kg/m².    General: No acute distress, alert and engaged  VULVA: normal appearing vulva with no masses, tenderness or lesions   VAGINA: normal appearing vagina with normal color. no discharge, no lesions   CERVIX: normal appearing cervix without discharge or lesions   UTERUS: uterus is normal size, shape, consistency and nontender   ADNEXA: normal adnexa in size, nontender and no " masses    Assessment/Plan:     Vulvovaginitis  -     Ureaplasma PCR Vagina; Future; Expected date: 02/01/2023  -     Vaginosis Screen by DNA Probe  -     Mycoplasma genitalium Molecular Detection, PCR Cervix; Future; Expected date: 02/01/2023    Affirm, Ureapalsma, and Mycoplasma collected.  Will follow-up via portal with results.    STD testing in December negative.    Continue weekly maintenance dose of Diflucan as ordered and Lotrisone PRN.    Patient was counseled today on vaginitis prevention including :  a. avoiding feminine products such as deoderant soaps, body wash, bubble bath, douches, scented toilet paper, deoderant tampons or pads, feminine wipes, chronic pad use, etc.  b. avoiding other vulvovaginal irritants such as long hot baths, humidity, tight, synthetic clothing, chlorine and sitting around in wet bathing suits  c. wearing cotton underwear, avoiding thong underwear and no underwear to bed  d. taking showers instead of baths and use a hair dryer on cool setting afterwards to dry  e. wearing cotton to exercise and shower immediately after exercise and change clothes  f. using polyurethane condoms without spermicide if sexually active and symptoms are triggered by intercourse    Use of MyChart and Patient Portal recommended to patient today.    FOLLOW UP: PRN lack of improvement.    Kaykay Rock (Maggie), ROSETTA  Obstetrics and Gynecology  [facility]

## 2023-02-03 LAB
M GENITALIUM DNA UR QL NAA+PROBE: NEGATIVE
SPECIMEN SOURCE: NORMAL

## 2023-02-06 ENCOUNTER — OFFICE VISIT (OUTPATIENT)
Dept: OBSTETRICS AND GYNECOLOGY | Facility: CLINIC | Age: 23
End: 2023-02-06
Payer: COMMERCIAL

## 2023-02-06 ENCOUNTER — PATIENT MESSAGE (OUTPATIENT)
Dept: OBSTETRICS AND GYNECOLOGY | Facility: CLINIC | Age: 23
End: 2023-02-06
Payer: COMMERCIAL

## 2023-02-06 DIAGNOSIS — R68.82 DECREASED LIBIDO: Primary | ICD-10-CM

## 2023-02-06 LAB
BACTERIAL VAGINOSIS DNA: NEGATIVE
CANDIDA GLABRATA DNA: NEGATIVE
CANDIDA KRUSEI DNA: NEGATIVE
CANDIDA RRNA VAG QL PROBE: NEGATIVE
SPECIMEN SOURCE: NORMAL
T VAGINALIS RRNA GENITAL QL PROBE: NEGATIVE
U PARVUM DNA SPEC QL NAA+PROBE: NEGATIVE
U UREALYTICUM DNA SPEC QL NAA+PROBE: NEGATIVE

## 2023-02-06 PROCEDURE — 99213 OFFICE O/P EST LOW 20 MIN: CPT | Mod: 95,,, | Performed by: OBSTETRICS & GYNECOLOGY

## 2023-02-06 PROCEDURE — 99213 PR OFFICE/OUTPT VISIT, EST, LEVL III, 20-29 MIN: ICD-10-PCS | Mod: 95,,, | Performed by: OBSTETRICS & GYNECOLOGY

## 2023-02-06 NOTE — PROGRESS NOTES
The patient location is: [place]  The chief complaint leading to consultation is: follow up hormone labs from PCP    Visit type: audiovisual    Face to Face time with patient: 8 min  10 minutes of total time spent on the encounter, which includes face to face time and non-face to face time preparing to see the patient (eg, review of tests), Obtaining and/or reviewing separately obtained history, Documenting clinical information in the electronic or other health record, Independently interpreting results (not separately reported) and communicating results to the patient/family/caregiver, or Care coordination (not separately reported).         Each patient to whom he or she provides medical services by telemedicine is:  (1) informed of the relationship between the physician and patient and the respective role of any other health care provider with respect to management of the patient; and (2) notified that he or she may decline to receive medical services by telemedicine and may withdraw from such care at any time.    Notes: shireen here for virtual visit to discuss hormone labs ordered by PCP. The reason labs were ordered was because when she stopped the Lexapro she felt her libido was low. She says now though it seems to be better so she is less worried about it. Also came in recently for vaginal discharge but all the results are normal. All questions answered. Has appt with new psychiatrist. Currently on the Lamictal but stopped the Lexapro. All questions answered.

## 2023-03-27 ENCOUNTER — PATIENT MESSAGE (OUTPATIENT)
Dept: PSYCHIATRY | Facility: CLINIC | Age: 23
End: 2023-03-27
Payer: COMMERCIAL

## 2023-03-27 ENCOUNTER — OFFICE VISIT (OUTPATIENT)
Dept: PSYCHIATRY | Facility: CLINIC | Age: 23
End: 2023-03-27
Payer: COMMERCIAL

## 2023-03-27 ENCOUNTER — LAB VISIT (OUTPATIENT)
Dept: LAB | Facility: HOSPITAL | Age: 23
End: 2023-03-27
Payer: COMMERCIAL

## 2023-03-27 VITALS
BODY MASS INDEX: 20.45 KG/M2 | DIASTOLIC BLOOD PRESSURE: 62 MMHG | HEART RATE: 74 BPM | WEIGHT: 122.94 LBS | SYSTOLIC BLOOD PRESSURE: 101 MMHG

## 2023-03-27 DIAGNOSIS — F41.1 GENERALIZED ANXIETY DISORDER: ICD-10-CM

## 2023-03-27 DIAGNOSIS — R53.83 FATIGUE, UNSPECIFIED TYPE: ICD-10-CM

## 2023-03-27 DIAGNOSIS — F41.1 GENERALIZED ANXIETY DISORDER: Primary | ICD-10-CM

## 2023-03-27 DIAGNOSIS — M79.10 MYALGIA: ICD-10-CM

## 2023-03-27 LAB
FERRITIN SERPL-MCNC: 60 NG/ML (ref 20–300)
IRON SERPL-MCNC: 87 UG/DL (ref 30–160)
SATURATED IRON: 24 % (ref 20–50)
TOTAL IRON BINDING CAPACITY: 358 UG/DL (ref 250–450)
TRANSFERRIN SERPL-MCNC: 242 MG/DL (ref 200–375)
VIT B12 SERPL-MCNC: 285 PG/ML (ref 210–950)

## 2023-03-27 PROCEDURE — 84466 ASSAY OF TRANSFERRIN: CPT | Performed by: NURSE PRACTITIONER

## 2023-03-27 PROCEDURE — 3078F DIAST BP <80 MM HG: CPT | Mod: CPTII,S$GLB,, | Performed by: NURSE PRACTITIONER

## 2023-03-27 PROCEDURE — 3008F BODY MASS INDEX DOCD: CPT | Mod: CPTII,S$GLB,, | Performed by: NURSE PRACTITIONER

## 2023-03-27 PROCEDURE — 3074F SYST BP LT 130 MM HG: CPT | Mod: CPTII,S$GLB,, | Performed by: NURSE PRACTITIONER

## 2023-03-27 PROCEDURE — 3008F PR BODY MASS INDEX (BMI) DOCUMENTED: ICD-10-PCS | Mod: CPTII,S$GLB,, | Performed by: NURSE PRACTITIONER

## 2023-03-27 PROCEDURE — 3074F PR MOST RECENT SYSTOLIC BLOOD PRESSURE < 130 MM HG: ICD-10-PCS | Mod: CPTII,S$GLB,, | Performed by: NURSE PRACTITIONER

## 2023-03-27 PROCEDURE — 82607 VITAMIN B-12: CPT | Performed by: NURSE PRACTITIONER

## 2023-03-27 PROCEDURE — 1160F PR REVIEW ALL MEDS BY PRESCRIBER/CLIN PHARMACIST DOCUMENTED: ICD-10-PCS | Mod: CPTII,S$GLB,, | Performed by: NURSE PRACTITIONER

## 2023-03-27 PROCEDURE — 99999 PR PBB SHADOW E&M-EST. PATIENT-LVL III: ICD-10-PCS | Mod: PBBFAC,,, | Performed by: NURSE PRACTITIONER

## 2023-03-27 PROCEDURE — 3078F PR MOST RECENT DIASTOLIC BLOOD PRESSURE < 80 MM HG: ICD-10-PCS | Mod: CPTII,S$GLB,, | Performed by: NURSE PRACTITIONER

## 2023-03-27 PROCEDURE — 99999 PR PBB SHADOW E&M-EST. PATIENT-LVL III: CPT | Mod: PBBFAC,,, | Performed by: NURSE PRACTITIONER

## 2023-03-27 PROCEDURE — 90792 PR PSYCHIATRIC DIAGNOSTIC EVALUATION W/MEDICAL SERVICES: ICD-10-PCS | Mod: S$GLB,,, | Performed by: NURSE PRACTITIONER

## 2023-03-27 PROCEDURE — 1159F PR MEDICATION LIST DOCUMENTED IN MEDICAL RECORD: ICD-10-PCS | Mod: CPTII,S$GLB,, | Performed by: NURSE PRACTITIONER

## 2023-03-27 PROCEDURE — 82652 VIT D 1 25-DIHYDROXY: CPT | Performed by: NURSE PRACTITIONER

## 2023-03-27 PROCEDURE — 82728 ASSAY OF FERRITIN: CPT | Performed by: NURSE PRACTITIONER

## 2023-03-27 PROCEDURE — 1159F MED LIST DOCD IN RCRD: CPT | Mod: CPTII,S$GLB,, | Performed by: NURSE PRACTITIONER

## 2023-03-27 PROCEDURE — 90792 PSYCH DIAG EVAL W/MED SRVCS: CPT | Mod: S$GLB,,, | Performed by: NURSE PRACTITIONER

## 2023-03-27 PROCEDURE — 1160F RVW MEDS BY RX/DR IN RCRD: CPT | Mod: CPTII,S$GLB,, | Performed by: NURSE PRACTITIONER

## 2023-03-27 RX ORDER — FLUCONAZOLE 150 MG/1
150 TABLET ORAL WEEKLY
COMMUNITY
End: 2023-07-10

## 2023-03-27 RX ORDER — LAMOTRIGINE 25 MG/1
50 TABLET ORAL DAILY
Qty: 60 TABLET | Refills: 1 | Status: SHIPPED | OUTPATIENT
Start: 2023-03-27 | End: 2023-07-10
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
## 2023-03-30 LAB — 1,25(OH)2D3 SERPL-MCNC: 56 PG/ML (ref 20–79)

## 2023-04-04 ENCOUNTER — PATIENT MESSAGE (OUTPATIENT)
Dept: PSYCHIATRY | Facility: CLINIC | Age: 23
End: 2023-04-04
Payer: COMMERCIAL

## 2023-04-19 ENCOUNTER — OFFICE VISIT (OUTPATIENT)
Dept: PSYCHIATRY | Facility: CLINIC | Age: 23
End: 2023-04-19
Payer: COMMERCIAL

## 2023-04-19 ENCOUNTER — PATIENT MESSAGE (OUTPATIENT)
Dept: PSYCHIATRY | Facility: CLINIC | Age: 23
End: 2023-04-19
Payer: COMMERCIAL

## 2023-04-19 DIAGNOSIS — F41.1 GENERALIZED ANXIETY DISORDER: Primary | ICD-10-CM

## 2023-04-19 PROCEDURE — 90791 PR PSYCHIATRIC DIAGNOSTIC EVALUATION: ICD-10-PCS | Mod: S$GLB,,, | Performed by: PSYCHOLOGIST

## 2023-04-19 PROCEDURE — 99499 NO LOS: ICD-10-PCS | Mod: S$GLB,,, | Performed by: PSYCHOLOGIST

## 2023-04-19 PROCEDURE — 90791 PSYCH DIAGNOSTIC EVALUATION: CPT | Mod: S$GLB,,, | Performed by: PSYCHOLOGIST

## 2023-04-19 PROCEDURE — 99499 UNLISTED E&M SERVICE: CPT | Mod: S$GLB,,, | Performed by: PSYCHOLOGIST
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
## 2023-04-24 ENCOUNTER — PATIENT MESSAGE (OUTPATIENT)
Dept: PSYCHIATRY | Facility: CLINIC | Age: 23
End: 2023-04-24
Payer: COMMERCIAL

## 2023-05-03 ENCOUNTER — OFFICE VISIT (OUTPATIENT)
Dept: PSYCHIATRY | Facility: CLINIC | Age: 23
End: 2023-05-03
Payer: COMMERCIAL

## 2023-05-03 DIAGNOSIS — F41.1 GENERALIZED ANXIETY DISORDER: Primary | ICD-10-CM

## 2023-05-03 PROCEDURE — 90834 PR PSYCHOTHERAPY W/PATIENT, 45 MIN: ICD-10-PCS | Mod: S$GLB,,, | Performed by: PSYCHOLOGIST

## 2023-05-03 PROCEDURE — 90834 PSYTX W PT 45 MINUTES: CPT | Mod: S$GLB,,, | Performed by: PSYCHOLOGIST

## 2023-05-03 NOTE — PROGRESS NOTES
[facility]  Individual Psychotherapy (PhD/LCSW)     Date: 5/3/2023     Site:  [facility]          Therapeutic Intervention: Met with patient.  Outpatient - Insight oriented psychotherapy 38 min - CPT code 72114     Chief complaint/reason for encounter: Anxiety       Interval history and content of current session:  Ms. Inez Escobar  arrived early to her scheduled appointment      Cognitive Behavioral Therapy for Anxiety (CBT-A), Session #1   Session Focus:  Normal Worry vs. Generalized Anxiety  Why It's Important to Record  Record Keeping              Worry Record              Daily Mood Record                 Practice Assignment:  Monitor anxiety episodes and daily anxiety using the Worry Record and the Daily Mood Record.     Treatment plan:  Target symptoms:  anxiety-related symptoms  Why chosen therapy is appropriate versus another modality: relevant to diagnosis, evidence-based practice  Outcome monitoring methods: self-report, checklist/rating scale  Therapeutic intervention type: insight-oriented psychotherapy, behavior modifying psychotherapy     Risk parameters:  Patient reports no suicidal ideation  Patient reports no homicidal ideation  Patient reports no self-injurious behavior  Patient reports no violent behavior     Verbal deficits: None     Patient's response to intervention:  Receptive and thoughtful      Progress toward goals and other mental status changes:  Started     Diagnosis:     ICD-10-CM ICD-9-CM   1. Generalized anxiety disorder  F41.1 300.02        Plan:  individual psychotherapy     Return to clinic: 1 week     Length of Service (minutes): 38

## 2023-05-10 ENCOUNTER — OFFICE VISIT (OUTPATIENT)
Dept: PSYCHIATRY | Facility: CLINIC | Age: 23
End: 2023-05-10
Payer: COMMERCIAL

## 2023-05-10 DIAGNOSIS — F41.1 GENERALIZED ANXIETY DISORDER: Primary | ICD-10-CM

## 2023-05-10 PROCEDURE — 90834 PSYTX W PT 45 MINUTES: CPT | Mod: S$GLB,,, | Performed by: PSYCHOLOGIST

## 2023-05-10 PROCEDURE — 90834 PR PSYCHOTHERAPY W/PATIENT, 45 MIN: ICD-10-PCS | Mod: S$GLB,,, | Performed by: PSYCHOLOGIST

## 2023-05-10 NOTE — PROGRESS NOTES
[facility]  Individual Psychotherapy (PhD/LCSW)     Date: 05/10/2023     Site:  [facility]          Therapeutic Intervention: Met with patient.  Outpatient - Insight oriented psychotherapy 45 min - CPT code 92340     Chief complaint/reason for encounter: Anxiety       Interval history and content of current session:  Ms. Inez Escobar  arrived early to her scheduled appointment      Cognitive Behavioral Therapy for Anxiety (CBT-A), Session #2   Session Focus:  Review Homework -Patient reported that she completed homework, but left forms at home  Understand the purpose of anxiety and its components  Recognize your own physical symptoms of anxiety and record them  Fill out Sequence of Anxiety Components form  Continue record-keeping  Complete Self-assessment                 Practice Assignment:  Continue record-keeping using the Worry Record and the Daily Mood Record.  Use information on your Worry Record forms to identify positive feedback loops among your anxiety components.     Treatment plan:  Target symptoms:  anxiety-related symptoms  Why chosen therapy is appropriate versus another modality: relevant to diagnosis, evidence-based practice  Outcome monitoring methods: self-report, checklist/rating scale  Therapeutic intervention type: insight-oriented psychotherapy, behavior modifying psychotherapy     Risk parameters:  Patient reports no suicidal ideation  Patient reports no homicidal ideation  Patient reports no self-injurious behavior  Patient reports no violent behavior     Verbal deficits: None     Patient's response to intervention:  Receptive      Progress toward goals and other mental status changes:  Started     Diagnosis:       ICD-10-CM ICD-9-CM   1. Generalized anxiety disorder  F41.1 300.02         Plan:  individual psychotherapy     Return to clinic: 1 week     Length of Service (minutes): 45

## 2023-05-18 ENCOUNTER — PATIENT MESSAGE (OUTPATIENT)
Dept: PSYCHIATRY | Facility: CLINIC | Age: 23
End: 2023-05-18
Payer: COMMERCIAL

## 2023-06-06 ENCOUNTER — OFFICE VISIT (OUTPATIENT)
Dept: PSYCHIATRY | Facility: CLINIC | Age: 23
End: 2023-06-06
Payer: COMMERCIAL

## 2023-06-06 DIAGNOSIS — F41.1 GENERALIZED ANXIETY DISORDER: Primary | ICD-10-CM

## 2023-06-06 PROCEDURE — 90834 PSYTX W PT 45 MINUTES: CPT | Mod: S$GLB,,, | Performed by: PSYCHOLOGIST

## 2023-06-06 PROCEDURE — 90834 PR PSYCHOTHERAPY W/PATIENT, 45 MIN: ICD-10-PCS | Mod: S$GLB,,, | Performed by: PSYCHOLOGIST

## 2023-06-06 NOTE — PROGRESS NOTES
[facility]  Individual Psychotherapy (PhD/LCSW)     Date: 06/06/2023     Site:  [facility]          Therapeutic Intervention: Met with patient.  Outpatient - Insight oriented psychotherapy 45 min - CPT code 94811     Chief complaint/reason for encounter: Anxiety       Interval history and content of current session:  Ms. Inez Escobar  arrived a few minutes late to her scheduled appointment.    Session Focus  To review your homework   Patient left daily mood log at home, but shared an incident written in her worry log  To learn what causes normal worry  Process concerns related to significant other, history of co-dependence, changes in mood   and desire for independence     Practice Assignment  Continue self-monitoring using the Worry Record and the Daily Mood Record.     Treatment plan:  Target symptoms:  anxiety-related symptoms  Why chosen therapy is appropriate versus another modality: relevant to diagnosis, evidence-based practice  Outcome monitoring methods: self-report, checklist/rating scale  Therapeutic intervention type: insight-oriented psychotherapy, behavior modifying psychotherapy     Risk parameters:  Patient reports no suicidal ideation  Patient reports no homicidal ideation  Patient reports no self-injurious behavior  Patient reports no violent behavior     Verbal deficits: None     Patient's response to intervention:  Receptive      Progress toward goals and other mental status changes:  Progressing Appropriately     Diagnosis:       ICD-10-CM ICD-9-CM   1. Generalized anxiety disorder  F41.1 300.02         Plan:  individual psychotherapy     Return to clinic: 1 week     Length of Service (minutes): 45

## 2023-06-21 ENCOUNTER — OFFICE VISIT (OUTPATIENT)
Dept: PSYCHIATRY | Facility: CLINIC | Age: 23
End: 2023-06-21
Payer: COMMERCIAL

## 2023-06-21 DIAGNOSIS — F41.1 GENERALIZED ANXIETY DISORDER: Primary | ICD-10-CM

## 2023-06-21 PROCEDURE — 90837 PR PSYCHOTHERAPY W/PATIENT, 60 MIN: ICD-10-PCS | Mod: S$GLB,,, | Performed by: PSYCHOLOGIST

## 2023-06-21 PROCEDURE — 90837 PSYTX W PT 60 MINUTES: CPT | Mod: S$GLB,,, | Performed by: PSYCHOLOGIST

## 2023-06-21 NOTE — PROGRESS NOTES
[facility]  Individual Psychotherapy (PhD/LCSW)     Date: 06/21/2023     Site:  [facility]          Therapeutic Intervention: Met with patient.  Outpatient - Insight oriented psychotherapy 55 min - CPT code 94047     Chief complaint/reason for encounter: Anxiety       Interval history and content of current session:  Ms. Inez Escobar  arrived on time to her scheduled appointment. She appeared euthymic and she remained engaged. Patient also requested to discuss anxiety regarding with Advent views and cognitive dissonance around sexuality.      Cognitive Behavioral Therapy for Anxiety (CBT-A), Session #4  Session Focus:  Review homework  Learn progressive muscle relaxation and other relaxation techniques     Practice Assignment:  Continue to monitor anxiety episodes and daily anxiety using the Worry Record and the Daily Mood Record.   Continue to observe your episode of anxiety in terms of the behavioral, physical, and cognitive response components, and the ways that they interact     Treatment plan:  Target symptoms:  anxiety-related symptoms  Why chosen therapy is appropriate versus another modality: relevant to diagnosis, evidence-based practice  Outcome monitoring methods: self-report, checklist/rating scale  Therapeutic intervention type: insight-oriented psychotherapy, behavior modifying psychotherapy     Risk parameters:  Patient reports no suicidal ideation  Patient reports no homicidal ideation  Patient reports no self-injurious behavior  Patient reports no violent behavior     Verbal deficits: None     Patient's response to intervention:  Receptive      Progress toward goals and other mental status changes:  Progressing Appropriately     Diagnosis:       ICD-10-CM ICD-9-CM   1. Generalized anxiety disorder  F41.1 300.02       Plan:  individual psychotherapy     Return to clinic: 1 week     Length of Service (minutes): 55

## 2023-06-25 ENCOUNTER — PATIENT MESSAGE (OUTPATIENT)
Dept: OBSTETRICS AND GYNECOLOGY | Facility: CLINIC | Age: 23
End: 2023-06-25
Payer: COMMERCIAL

## 2023-06-25 DIAGNOSIS — N89.8 VAGINAL ITCHING: Primary | ICD-10-CM

## 2023-06-28 ENCOUNTER — OFFICE VISIT (OUTPATIENT)
Dept: PSYCHIATRY | Facility: CLINIC | Age: 23
End: 2023-06-28
Payer: COMMERCIAL

## 2023-06-28 DIAGNOSIS — F41.1 GENERALIZED ANXIETY DISORDER: Primary | ICD-10-CM

## 2023-06-28 PROCEDURE — 99999 PR PBB SHADOW E&M-EST. PATIENT-LVL I: ICD-10-PCS | Mod: PBBFAC,,, | Performed by: PSYCHOLOGIST

## 2023-06-28 PROCEDURE — 90837 PSYTX W PT 60 MINUTES: CPT | Mod: S$GLB,,, | Performed by: PSYCHOLOGIST

## 2023-06-28 PROCEDURE — 99999 PR PBB SHADOW E&M-EST. PATIENT-LVL I: CPT | Mod: PBBFAC,,, | Performed by: PSYCHOLOGIST

## 2023-06-28 PROCEDURE — 90837 PR PSYCHOTHERAPY W/PATIENT, 60 MIN: ICD-10-PCS | Mod: S$GLB,,, | Performed by: PSYCHOLOGIST

## 2023-06-28 RX ORDER — FLUCONAZOLE 150 MG/1
150 TABLET ORAL DAILY
Qty: 2 TABLET | Refills: 0 | Status: SHIPPED | OUTPATIENT
Start: 2023-06-28 | End: 2023-06-30

## 2023-06-28 NOTE — PROGRESS NOTES
[facility]  Individual Psychotherapy (PhD/LCSW)     Date: 06/28/2023     Site:  [facility]          Therapeutic Intervention: Met with patient.  Outpatient - Insight oriented psychotherapy 55 min - CPT code 38061     Chief complaint/reason for encounter: Anxiety       Interval history and content of current session:  Ms. Inez Escobar  arrived on time to her scheduled appointment. She appeared euthymic and she remained engaged. She stated that she experienced low levels of anxiety over the last week.      Cognitive Behavioral Therapy for Anxiety (CBT-A), Session #5  Session Focus:  Review homework  Learn and practice relaxation techniques  Learn to change anxious patterns of thinking  Practice self-statement training  Learn to use Worry Record-Real Odds Form     Practice Assignment:  Continue to practice relaxation  Continue keeping records of you daily mood and anxiety episodes  Challenge your anxious thoughts. For every episode of anxiety, identify specific thoughts, evaluate the evidence, and consider alternatives (using pie charts), and then rate the realistic probability using a 0-100 point scale.  Begin using Worry Record-Real Odds form    Treatment plan:  Target symptoms:  anxiety-related symptoms  Why chosen therapy is appropriate versus another modality: relevant to diagnosis, evidence-based practice  Outcome monitoring methods: self-report, checklist/rating scale  Therapeutic intervention type: insight-oriented psychotherapy, behavior modifying psychotherapy     Risk parameters:  Patient reports no suicidal ideation  Patient reports no homicidal ideation  Patient reports no self-injurious behavior  Patient reports no violent behavior     Verbal deficits: None     Patient's response to intervention:  Receptive      Progress toward goals and other mental status changes:  Progressing Appropriately     Diagnosis:       ICD-10-CM ICD-9-CM   1. Generalized anxiety disorder  F41.1 300.02        Plan:  individual psychotherapy     Return to clinic: 1 week     Length of Service (minutes): 55

## 2023-07-05 ENCOUNTER — OFFICE VISIT (OUTPATIENT)
Dept: PSYCHIATRY | Facility: CLINIC | Age: 23
End: 2023-07-05
Payer: COMMERCIAL

## 2023-07-05 DIAGNOSIS — F41.1 GENERALIZED ANXIETY DISORDER: Primary | ICD-10-CM

## 2023-07-05 PROCEDURE — 90834 PSYTX W PT 45 MINUTES: CPT | Mod: S$GLB,,, | Performed by: PSYCHOLOGIST

## 2023-07-05 PROCEDURE — 90834 PR PSYCHOTHERAPY W/PATIENT, 45 MIN: ICD-10-PCS | Mod: S$GLB,,, | Performed by: PSYCHOLOGIST

## 2023-07-05 NOTE — PROGRESS NOTES
[facility]  Individual Psychotherapy (PhD/LCSW)     Date: 07/05/2023     Site:  [facility]          Therapeutic Intervention: Met with patient.  Outpatient - Insight oriented psychotherapy 40 min - CPT code 36442     Chief complaint/reason for encounter: Anxiety       Interval history and content of current session:  Ms. Inez Escobar  arrived on time to her scheduled appointment. She appeared euthymic and she remained engaged. Therapist helped patient use problem solving skills to manage work-related concerns. Therapist also helped patient practice assertive communication skills using role play.      Cognitive Behavioral Therapy for Anxiety (CBT-A), Session 6   Session Focus:  To review your homework   To learn and practice relaxation techniques   To understand the concepts of catastrophizing and decatastrophizing   To begin using the Worry Record-Real Odds & Coping form     Practice Assignment:  Continue your daily monitoring using the Worry Record and the Daily Mood Record.  Practice relaxation.  Use both types of strategies to counter anxious thoughts: (1) question the evidence, consider alternatives, and rate the realistic odds; and (2) consider the worst that can happen, consider alternatives, and think of ways to cope.  Begin using the new Worry Record-Real Odds & Coping form.     Treatment plan:  Target symptoms:  anxiety-related symptoms  Why chosen therapy is appropriate versus another modality: relevant to diagnosis, evidence-based practice  Outcome monitoring methods: self-report, checklist/rating scale  Therapeutic intervention type: insight-oriented psychotherapy, behavior modifying psychotherapy     Risk parameters:  Patient reports no suicidal ideation  Patient reports no homicidal ideation  Patient reports no self-injurious behavior  Patient reports no violent behavior     Verbal deficits: None     Patient's response to intervention:  Receptive      Progress toward goals and other  mental status changes:  Progressing Appropriately     Diagnosis:       ICD-10-CM ICD-9-CM   1. Generalized anxiety disorder  F41.1 300.02       Plan:  individual psychotherapy     Return to clinic: 1 week     Length of Service (minutes): 40

## 2023-07-10 ENCOUNTER — OFFICE VISIT (OUTPATIENT)
Dept: OBSTETRICS AND GYNECOLOGY | Facility: CLINIC | Age: 23
End: 2023-07-10
Attending: OBSTETRICS & GYNECOLOGY
Payer: COMMERCIAL

## 2023-07-10 VITALS — HEIGHT: 65 IN | BODY MASS INDEX: 21.45 KG/M2 | WEIGHT: 128.75 LBS

## 2023-07-10 DIAGNOSIS — Z01.419 ENCOUNTER FOR GYNECOLOGICAL EXAMINATION (GENERAL) (ROUTINE) WITHOUT ABNORMAL FINDINGS: Primary | ICD-10-CM

## 2023-07-10 PROCEDURE — 1159F MED LIST DOCD IN RCRD: CPT | Mod: CPTII,S$GLB,, | Performed by: OBSTETRICS & GYNECOLOGY

## 2023-07-10 PROCEDURE — 99395 PREV VISIT EST AGE 18-39: CPT | Mod: S$GLB,,, | Performed by: OBSTETRICS & GYNECOLOGY

## 2023-07-10 PROCEDURE — 99999 PR PBB SHADOW E&M-EST. PATIENT-LVL II: CPT | Mod: PBBFAC,,, | Performed by: OBSTETRICS & GYNECOLOGY

## 2023-07-10 PROCEDURE — 1159F PR MEDICATION LIST DOCUMENTED IN MEDICAL RECORD: ICD-10-PCS | Mod: CPTII,S$GLB,, | Performed by: OBSTETRICS & GYNECOLOGY

## 2023-07-10 PROCEDURE — 3008F BODY MASS INDEX DOCD: CPT | Mod: CPTII,S$GLB,, | Performed by: OBSTETRICS & GYNECOLOGY

## 2023-07-10 PROCEDURE — 99395 PR PREVENTIVE VISIT,EST,18-39: ICD-10-PCS | Mod: S$GLB,,, | Performed by: OBSTETRICS & GYNECOLOGY

## 2023-07-10 PROCEDURE — 3008F PR BODY MASS INDEX (BMI) DOCUMENTED: ICD-10-PCS | Mod: CPTII,S$GLB,, | Performed by: OBSTETRICS & GYNECOLOGY

## 2023-07-10 PROCEDURE — 99999 PR PBB SHADOW E&M-EST. PATIENT-LVL II: ICD-10-PCS | Mod: PBBFAC,,, | Performed by: OBSTETRICS & GYNECOLOGY

## 2023-07-10 NOTE — PROGRESS NOTES
Subjective:       Patient ID: Inez Escobar is a 22 y.o. female.    Chief Complaint:  No chief complaint on file.        History of Present Illness  Inez Escobar is a 22 y.o. female G0 who presents for annual. Overall doing well. Sometimes skips a period with Mirena. Had another episode where she had a reaction and swelling after sex. I saw her for a visit the first time it happened. Same partner. No changes. Only thing was she did shave the night before. Discussed. All questions answered.     Patient's last menstrual period was 07/04/2023 (approximate).   Date of Last Pap: No result found    Review of Systems  Review of Systems   Constitutional:  Negative for chills and fever.      Objective:   Physical Exam:   Constitutional: She is oriented to person, place, and time. Vital signs are normal. She appears well-developed and well-nourished. No distress.        Pulmonary/Chest: She exhibits no mass. Right breast exhibits no mass, no nipple discharge, no skin change, no tenderness, no bleeding and no swelling. Left breast exhibits no mass, no nipple discharge, no skin change, no tenderness, no bleeding and no swelling. Breasts are symmetrical.        Abdominal: Soft. Bowel sounds are normal. She exhibits no distension and no mass. There is no abdominal tenderness. There is no rebound.     Genitourinary:    Vagina and uterus normal.   There is no rash, tenderness, lesion or injury on the right labia. There is no rash, tenderness, lesion or injury on the left labia. Cervix is normal. Right adnexum displays no mass, no tenderness and no fullness. Left adnexum displays no mass, no tenderness and no fullness. No erythema,  no vaginal discharge, tenderness, rectocele, cystocele or unspecified prolapse of vaginal walls in the vagina. Cervix exhibits no motion tenderness, no discharge and no friability. Uterus is not deviated, not enlarged, not fixed, not tender and not hosting fibroids. IUD strings  visualized.          Musculoskeletal: Normal range of motion and moves all extremeties.      Lymphadenopathy:        Right: No supraclavicular adenopathy present.        Left: No supraclavicular adenopathy present.    Neurological: She is alert and oriented to person, place, and time.    Skin: Skin is warm and dry.    Psychiatric: She has a normal mood and affect. Her behavior is normal. Judgment normal.      Assessment/ Plan:     1. Encounter for gynecological examination (general) (routine) without abnormal findings            No follow-ups on file.    As of April 1, 2021, the Cures Act has been passed nationally. This new law requires that all doctors progress notes, lab results, pathology reports and radiology reports be released IMMEDIATELY to the patient in the patient portal. That means that the results are released to you at the EXACT same time they are released to me. Therefore, with all of the patients that I have I am not able to reply to each patient exactly when the results come in. So there will be a delay from when you see the results to when I see them and have time to come up with a response to send you. Also I only see these results when I am on the computer at work. So if the results come in over the weekend or after 5 pm of a work day, I will not see them until the next business day. As you can tell, this is a challenge as a physician to give every patient the quick response they hope for and deserve. So please be patient! Thanks for understanding, Dr. Storey

## 2023-07-11 ENCOUNTER — TELEPHONE (OUTPATIENT)
Dept: PSYCHIATRY | Facility: CLINIC | Age: 23
End: 2023-07-11
Payer: COMMERCIAL

## 2023-07-11 NOTE — TELEPHONE ENCOUNTER
Attempted to contact patient. Left VM message reminding patient that therapy appointment originally scheduled tomorrow was moved to 7/19.

## 2023-07-13 ENCOUNTER — PATIENT MESSAGE (OUTPATIENT)
Dept: PSYCHIATRY | Facility: CLINIC | Age: 23
End: 2023-07-13
Payer: COMMERCIAL

## 2023-07-13 ENCOUNTER — OFFICE VISIT (OUTPATIENT)
Dept: PSYCHIATRY | Facility: CLINIC | Age: 23
End: 2023-07-13
Payer: COMMERCIAL

## 2023-07-13 VITALS
SYSTOLIC BLOOD PRESSURE: 107 MMHG | DIASTOLIC BLOOD PRESSURE: 59 MMHG | HEART RATE: 86 BPM | BODY MASS INDEX: 20.86 KG/M2 | WEIGHT: 125.31 LBS

## 2023-07-13 DIAGNOSIS — F41.1 GENERALIZED ANXIETY DISORDER: Primary | ICD-10-CM

## 2023-07-13 DIAGNOSIS — F41.8 TEST ANXIETY: ICD-10-CM

## 2023-07-13 DIAGNOSIS — F40.10 SOCIAL ANXIETY DISORDER: ICD-10-CM

## 2023-07-13 PROCEDURE — 3008F BODY MASS INDEX DOCD: CPT | Mod: CPTII,S$GLB,, | Performed by: NURSE PRACTITIONER

## 2023-07-13 PROCEDURE — 3078F PR MOST RECENT DIASTOLIC BLOOD PRESSURE < 80 MM HG: ICD-10-PCS | Mod: CPTII,S$GLB,, | Performed by: NURSE PRACTITIONER

## 2023-07-13 PROCEDURE — 99999 PR PBB SHADOW E&M-EST. PATIENT-LVL III: CPT | Mod: PBBFAC,,, | Performed by: NURSE PRACTITIONER

## 2023-07-13 PROCEDURE — 90833 PSYTX W PT W E/M 30 MIN: CPT | Mod: S$GLB,,, | Performed by: NURSE PRACTITIONER

## 2023-07-13 PROCEDURE — 3074F SYST BP LT 130 MM HG: CPT | Mod: CPTII,S$GLB,, | Performed by: NURSE PRACTITIONER

## 2023-07-13 PROCEDURE — 1160F PR REVIEW ALL MEDS BY PRESCRIBER/CLIN PHARMACIST DOCUMENTED: ICD-10-PCS | Mod: CPTII,S$GLB,, | Performed by: NURSE PRACTITIONER

## 2023-07-13 PROCEDURE — 1159F MED LIST DOCD IN RCRD: CPT | Mod: CPTII,S$GLB,, | Performed by: NURSE PRACTITIONER

## 2023-07-13 PROCEDURE — 99214 PR OFFICE/OUTPT VISIT, EST, LEVL IV, 30-39 MIN: ICD-10-PCS | Mod: S$GLB,,, | Performed by: NURSE PRACTITIONER

## 2023-07-13 PROCEDURE — 90833 PR PSYCHOTHERAPY W/PATIENT W/E&M, 30 MIN (ADD ON): ICD-10-PCS | Mod: S$GLB,,, | Performed by: NURSE PRACTITIONER

## 2023-07-13 PROCEDURE — 99214 OFFICE O/P EST MOD 30 MIN: CPT | Mod: S$GLB,,, | Performed by: NURSE PRACTITIONER

## 2023-07-13 PROCEDURE — 1159F PR MEDICATION LIST DOCUMENTED IN MEDICAL RECORD: ICD-10-PCS | Mod: CPTII,S$GLB,, | Performed by: NURSE PRACTITIONER

## 2023-07-13 PROCEDURE — 3008F PR BODY MASS INDEX (BMI) DOCUMENTED: ICD-10-PCS | Mod: CPTII,S$GLB,, | Performed by: NURSE PRACTITIONER

## 2023-07-13 PROCEDURE — 3074F PR MOST RECENT SYSTOLIC BLOOD PRESSURE < 130 MM HG: ICD-10-PCS | Mod: CPTII,S$GLB,, | Performed by: NURSE PRACTITIONER

## 2023-07-13 PROCEDURE — 1160F RVW MEDS BY RX/DR IN RCRD: CPT | Mod: CPTII,S$GLB,, | Performed by: NURSE PRACTITIONER

## 2023-07-13 PROCEDURE — 99999 PR PBB SHADOW E&M-EST. PATIENT-LVL III: ICD-10-PCS | Mod: PBBFAC,,, | Performed by: NURSE PRACTITIONER

## 2023-07-13 PROCEDURE — 3078F DIAST BP <80 MM HG: CPT | Mod: CPTII,S$GLB,, | Performed by: NURSE PRACTITIONER
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
## 2023-07-19 ENCOUNTER — OFFICE VISIT (OUTPATIENT)
Dept: PSYCHIATRY | Facility: CLINIC | Age: 23
End: 2023-07-19
Payer: COMMERCIAL

## 2023-07-19 DIAGNOSIS — F41.1 GENERALIZED ANXIETY DISORDER: Primary | ICD-10-CM

## 2023-07-19 PROCEDURE — 90837 PR PSYCHOTHERAPY W/PATIENT, 60 MIN: ICD-10-PCS | Mod: S$GLB,,, | Performed by: PSYCHOLOGIST

## 2023-07-19 PROCEDURE — 90837 PSYTX W PT 60 MINUTES: CPT | Mod: S$GLB,,, | Performed by: PSYCHOLOGIST

## 2023-07-20 NOTE — PROGRESS NOTES
[facility]  Individual Psychotherapy (PhD/LCSW)     Date: 07/19/2023     Site:  [facility]          Therapeutic Intervention: Met with patient.  Outpatient - Insight oriented psychotherapy 53 min - CPT code 26022     Chief complaint/reason for encounter: Anxiety       Interval history and content of current session:  Ms. Inez Escobar  arrived on time to her scheduled appointment. She appeared euthymic and she remained engaged. Patient discussed a conflict experienced with a family member over the weekend. Therapist helped patient explore boundaries, practice assertive communication, and navigate challenging conversations with family members to help decrease anxiety.       Cognitive Behavioral Therapy for Anxiety (CBT-A), Session 7  Session Focus:  Review homework  Learn realistic thinking strategies  Practice imagery exposure     Practice Assignment:  Continue daily monitoring   Practice relaxation  Apply realistic thinking strategies by considering the real odds, alternative possibilities, and ways of coping for each worrisome thought  Practice the imagery exposure exercise at least once a day, using three images of five minutes each. Record your practice using exposure record         Treatment plan:  Target symptoms:  anxiety-related symptoms  Why chosen therapy is appropriate versus another modality: relevant to diagnosis, evidence-based practice  Outcome monitoring methods: self-report, checklist/rating scale  Therapeutic intervention type: insight-oriented psychotherapy, behavior modifying psychotherapy     Risk parameters:  Patient reports no suicidal ideation  Patient reports no homicidal ideation  Patient reports no self-injurious behavior  Patient reports no violent behavior     Verbal deficits: None     Patient's response to intervention:  Receptive  Patient reported that she completes homework, but has trouble remembering to bring worksheets to sessions      Progress toward goals and  other mental status changes:  Progressing Appropriately     Diagnosis:       ICD-10-CM ICD-9-CM   1. Generalized anxiety disorder  F41.1 300.02       Plan:  individual psychotherapy     Return to clinic: 1 week     Length of Service (minutes): 53

## 2023-07-26 ENCOUNTER — OFFICE VISIT (OUTPATIENT)
Dept: PSYCHIATRY | Facility: CLINIC | Age: 23
End: 2023-07-26
Payer: COMMERCIAL

## 2023-07-26 DIAGNOSIS — F41.1 GENERALIZED ANXIETY DISORDER: Primary | ICD-10-CM

## 2023-07-26 PROCEDURE — 90834 PSYTX W PT 45 MINUTES: CPT | Mod: S$GLB,,, | Performed by: PSYCHOLOGIST

## 2023-07-26 PROCEDURE — 90834 PR PSYCHOTHERAPY W/PATIENT, 45 MIN: ICD-10-PCS | Mod: S$GLB,,, | Performed by: PSYCHOLOGIST

## 2023-07-26 NOTE — PROGRESS NOTES
[facility]  Individual Psychotherapy (PhD/LCSW)     Date:07/26/2023     Site:  [facility]          Therapeutic Intervention: Met with patient.  Outpatient - Insight oriented psychotherapy 38 min - CPT code 19564     Chief complaint/reason for encounter: Anxiety       Interval history and content of current session:  Ms. Inez Escobar  arrived on time to her scheduled appointment. She appeared euthymic and she remained engaged. Patient provided personal examples related to worry behaviors such as procrastination and  overcompensation.      Cognitive Behavioral Therapy for Anxiety (CBT-A), Session 8  Session Focus:  Review homework  Practice relaxation techniques  Fill out anxious behaviors form  Record progress on behavioral change form     Practice Assignment:  Tackle behavioral changes you have identified  Record practices on behavioral change form  Continue to record daily mood  Continue to practice relaxation techniques    Treatment plan:  Target symptoms:  anxiety-related symptoms  Why chosen therapy is appropriate versus another modality: relevant to diagnosis, evidence-based practice  Outcome monitoring methods: self-report, checklist/rating scale  Therapeutic intervention type: insight-oriented psychotherapy, behavior modifying psychotherapy     Risk parameters:  Patient reports no suicidal ideation  Patient reports no homicidal ideation  Patient reports no self-injurious behavior  Patient reports no violent behavior     Verbal deficits: None     Patient's response to intervention:  Receptive  Patient reported that she completes homework, but has trouble remembering to bring worksheets to sessions      Progress toward goals and other mental status changes:  Progressing Appropriately     Diagnosis:       ICD-10-CM ICD-9-CM   1. Generalized anxiety disorder  F41.1 300.02       Plan:  individual psychotherapy. Final session next week     Return to clinic: 1 week     Length of Service  (minutes): 38

## 2023-08-02 ENCOUNTER — OFFICE VISIT (OUTPATIENT)
Dept: PSYCHIATRY | Facility: CLINIC | Age: 23
End: 2023-08-02
Payer: COMMERCIAL

## 2023-08-02 DIAGNOSIS — F41.1 GENERALIZED ANXIETY DISORDER: Primary | ICD-10-CM

## 2023-08-02 PROCEDURE — 90837 PSYTX W PT 60 MINUTES: CPT | Mod: S$GLB,,, | Performed by: PSYCHOLOGIST

## 2023-08-02 PROCEDURE — 90837 PR PSYCHOTHERAPY W/PATIENT, 60 MIN: ICD-10-PCS | Mod: S$GLB,,, | Performed by: PSYCHOLOGIST

## 2023-08-02 NOTE — PROGRESS NOTES
[facility]  Individual Psychotherapy (PhD/LCSW)     Date: 08/02/2023     Site:  [facility]          Therapeutic Intervention: Met with patient.  Outpatient - Insight oriented psychotherapy  53 min - CPT code 62820     Chief complaint/reason for encounter: Anxiety       Interval history and content of current session:  Ms. Inez Escobar  arrived on time to her scheduled appointment. She appeared euthymic and she remained engaged. Patient provided personal examples related to worry behaviors such as procrastination and  overcompensation.      Cognitive Behavioral Therapy for Anxiety (CBT-A), Session 9  Session Focus:  To review homework   To learn time management, goal-setting, and problem-solving skills   To begin to use Daily Activities form      Practice Assignment:  Choose some problems that have been bothering you lately, and try the brainstorming technique. Use time management strategies every day for the next week.     Cognitive Behavioral Therapy for Anxiety (CBT-A), Session 10  Session Focus  To evaluate your progress using the Self-Evaluation form   To determine the next steps   To consider methods of maintaining your progress   To consider potential high-risk situations for the future     Practice Assignment  -When you notice yourself worrying about something over and over, or worrying that is difficult to stop, use this as a signal to evaluate your thoughts. Take a step outside of yourself and your anxiety cycle to ask probability questions, etc.  - Notice physical tension building in your body, and counter it with relaxation techniques         Treatment plan:  Target symptoms:  anxiety-related symptoms  Why chosen therapy is appropriate versus another modality: relevant to diagnosis, evidence-based practice  Outcome monitoring methods: self-report, checklist/rating scale  Therapeutic intervention type: insight-oriented psychotherapy, behavior modifying psychotherapy     Risk  parameters:  Patient reports no suicidal ideation  Patient reports no homicidal ideation  Patient reports no self-injurious behavior  Patient reports no violent behavior     Verbal deficits: None     Patient's response to intervention:  Receptive  Patient reported that she completes homework, but has trouble remembering to bring worksheets to sessions      Progress toward goals and other mental status changes:  Progressing Appropriately    Current CARLA 7: 7 (Mild Anxiety)  initial score 15 (Severe Anxiety) on 4/19/2023  1  1  1  1  1  1  1     Diagnosis:       ICD-10-CM ICD-9-CM   1. Generalized anxiety disorder  F41.1 300.02       Plan:  Patient completed CBT-CARLA protocol. Will resume medication management with Qing Samson NP.     Return to clinic: Booster session 8/31 if needed     Length of Service (minutes): 53

## 2023-09-21 ENCOUNTER — OFFICE VISIT (OUTPATIENT)
Dept: PSYCHIATRY | Facility: CLINIC | Age: 23
End: 2023-09-21
Payer: COMMERCIAL

## 2023-09-21 VITALS
BODY MASS INDEX: 20.4 KG/M2 | HEART RATE: 77 BPM | SYSTOLIC BLOOD PRESSURE: 98 MMHG | WEIGHT: 122.56 LBS | DIASTOLIC BLOOD PRESSURE: 68 MMHG

## 2023-09-21 DIAGNOSIS — F41.1 GENERALIZED ANXIETY DISORDER: Primary | ICD-10-CM

## 2023-09-21 DIAGNOSIS — F41.8 TEST ANXIETY: ICD-10-CM

## 2023-09-21 DIAGNOSIS — F40.10 SOCIAL ANXIETY DISORDER: ICD-10-CM

## 2023-09-21 PROCEDURE — 3008F PR BODY MASS INDEX (BMI) DOCUMENTED: ICD-10-PCS | Mod: CPTII,S$GLB,, | Performed by: NURSE PRACTITIONER

## 2023-09-21 PROCEDURE — 99999 PR PBB SHADOW E&M-EST. PATIENT-LVL III: ICD-10-PCS | Mod: PBBFAC,,, | Performed by: NURSE PRACTITIONER

## 2023-09-21 PROCEDURE — 3074F SYST BP LT 130 MM HG: CPT | Mod: CPTII,S$GLB,, | Performed by: NURSE PRACTITIONER

## 2023-09-21 PROCEDURE — 3078F PR MOST RECENT DIASTOLIC BLOOD PRESSURE < 80 MM HG: ICD-10-PCS | Mod: CPTII,S$GLB,, | Performed by: NURSE PRACTITIONER

## 2023-09-21 PROCEDURE — 99214 OFFICE O/P EST MOD 30 MIN: CPT | Mod: S$GLB,,, | Performed by: NURSE PRACTITIONER

## 2023-09-21 PROCEDURE — 3074F PR MOST RECENT SYSTOLIC BLOOD PRESSURE < 130 MM HG: ICD-10-PCS | Mod: CPTII,S$GLB,, | Performed by: NURSE PRACTITIONER

## 2023-09-21 PROCEDURE — 90833 PR PSYCHOTHERAPY W/PATIENT W/E&M, 30 MIN (ADD ON): ICD-10-PCS | Mod: S$GLB,,, | Performed by: NURSE PRACTITIONER

## 2023-09-21 PROCEDURE — 3078F DIAST BP <80 MM HG: CPT | Mod: CPTII,S$GLB,, | Performed by: NURSE PRACTITIONER

## 2023-09-21 PROCEDURE — 3008F BODY MASS INDEX DOCD: CPT | Mod: CPTII,S$GLB,, | Performed by: NURSE PRACTITIONER

## 2023-09-21 PROCEDURE — 1159F MED LIST DOCD IN RCRD: CPT | Mod: CPTII,S$GLB,, | Performed by: NURSE PRACTITIONER

## 2023-09-21 PROCEDURE — 1160F RVW MEDS BY RX/DR IN RCRD: CPT | Mod: CPTII,S$GLB,, | Performed by: NURSE PRACTITIONER

## 2023-09-21 PROCEDURE — 99214 PR OFFICE/OUTPT VISIT, EST, LEVL IV, 30-39 MIN: ICD-10-PCS | Mod: S$GLB,,, | Performed by: NURSE PRACTITIONER

## 2023-09-21 PROCEDURE — 99999 PR PBB SHADOW E&M-EST. PATIENT-LVL III: CPT | Mod: PBBFAC,,, | Performed by: NURSE PRACTITIONER

## 2023-09-21 PROCEDURE — 1159F PR MEDICATION LIST DOCUMENTED IN MEDICAL RECORD: ICD-10-PCS | Mod: CPTII,S$GLB,, | Performed by: NURSE PRACTITIONER

## 2023-09-21 PROCEDURE — 90833 PSYTX W PT W E/M 30 MIN: CPT | Mod: S$GLB,,, | Performed by: NURSE PRACTITIONER

## 2023-09-21 PROCEDURE — 1160F PR REVIEW ALL MEDS BY PRESCRIBER/CLIN PHARMACIST DOCUMENTED: ICD-10-PCS | Mod: CPTII,S$GLB,, | Performed by: NURSE PRACTITIONER

## 2023-09-21 RX ORDER — PROPRANOLOL HYDROCHLORIDE 10 MG/1
10 TABLET ORAL 2 TIMES DAILY PRN
Qty: 60 TABLET | Refills: 3 | Status: SHIPPED | OUTPATIENT
Start: 2023-09-21 | End: 2023-12-29 | Stop reason: DRUGHIGH
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
## 2023-11-08 DIAGNOSIS — B00.9 HERPES: Primary | ICD-10-CM

## 2023-11-09 RX ORDER — VALACYCLOVIR HYDROCHLORIDE 500 MG/1
500 TABLET, FILM COATED ORAL 2 TIMES DAILY
Qty: 30 TABLET | Refills: 1 | Status: SHIPPED | OUTPATIENT
Start: 2023-11-09

## 2023-12-29 ENCOUNTER — OFFICE VISIT (OUTPATIENT)
Dept: PSYCHIATRY | Facility: CLINIC | Age: 23
End: 2023-12-29
Payer: COMMERCIAL

## 2023-12-29 VITALS
HEART RATE: 79 BPM | BODY MASS INDEX: 19.59 KG/M2 | SYSTOLIC BLOOD PRESSURE: 115 MMHG | WEIGHT: 117.75 LBS | DIASTOLIC BLOOD PRESSURE: 72 MMHG

## 2023-12-29 DIAGNOSIS — F41.1 GENERALIZED ANXIETY DISORDER: ICD-10-CM

## 2023-12-29 DIAGNOSIS — F40.10 SOCIAL ANXIETY DISORDER: Primary | ICD-10-CM

## 2023-12-29 DIAGNOSIS — F41.8 TEST ANXIETY: ICD-10-CM

## 2023-12-29 DIAGNOSIS — R53.83 FATIGUE, UNSPECIFIED TYPE: ICD-10-CM

## 2023-12-29 PROCEDURE — 99214 OFFICE O/P EST MOD 30 MIN: CPT | Mod: S$GLB,,, | Performed by: NURSE PRACTITIONER

## 2023-12-29 PROCEDURE — 99214 PR OFFICE/OUTPT VISIT, EST, LEVL IV, 30-39 MIN: ICD-10-PCS | Mod: S$GLB,,, | Performed by: NURSE PRACTITIONER

## 2023-12-29 PROCEDURE — 99999 PR PBB SHADOW E&M-EST. PATIENT-LVL III: CPT | Mod: PBBFAC,,, | Performed by: NURSE PRACTITIONER

## 2023-12-29 PROCEDURE — 3074F SYST BP LT 130 MM HG: CPT | Mod: CPTII,S$GLB,, | Performed by: NURSE PRACTITIONER

## 2023-12-29 PROCEDURE — 1160F RVW MEDS BY RX/DR IN RCRD: CPT | Mod: CPTII,S$GLB,, | Performed by: NURSE PRACTITIONER

## 2023-12-29 PROCEDURE — 3078F DIAST BP <80 MM HG: CPT | Mod: CPTII,S$GLB,, | Performed by: NURSE PRACTITIONER

## 2023-12-29 PROCEDURE — 1160F PR REVIEW ALL MEDS BY PRESCRIBER/CLIN PHARMACIST DOCUMENTED: ICD-10-PCS | Mod: CPTII,S$GLB,, | Performed by: NURSE PRACTITIONER

## 2023-12-29 PROCEDURE — 1159F MED LIST DOCD IN RCRD: CPT | Mod: CPTII,S$GLB,, | Performed by: NURSE PRACTITIONER

## 2023-12-29 PROCEDURE — 1159F PR MEDICATION LIST DOCUMENTED IN MEDICAL RECORD: ICD-10-PCS | Mod: CPTII,S$GLB,, | Performed by: NURSE PRACTITIONER

## 2023-12-29 PROCEDURE — 99999 PR PBB SHADOW E&M-EST. PATIENT-LVL III: ICD-10-PCS | Mod: PBBFAC,,, | Performed by: NURSE PRACTITIONER

## 2023-12-29 PROCEDURE — 3078F PR MOST RECENT DIASTOLIC BLOOD PRESSURE < 80 MM HG: ICD-10-PCS | Mod: CPTII,S$GLB,, | Performed by: NURSE PRACTITIONER

## 2023-12-29 PROCEDURE — 3008F PR BODY MASS INDEX (BMI) DOCUMENTED: ICD-10-PCS | Mod: CPTII,S$GLB,, | Performed by: NURSE PRACTITIONER

## 2023-12-29 PROCEDURE — 3074F PR MOST RECENT SYSTOLIC BLOOD PRESSURE < 130 MM HG: ICD-10-PCS | Mod: CPTII,S$GLB,, | Performed by: NURSE PRACTITIONER

## 2023-12-29 PROCEDURE — 3008F BODY MASS INDEX DOCD: CPT | Mod: CPTII,S$GLB,, | Performed by: NURSE PRACTITIONER

## 2023-12-29 RX ORDER — FLUOXETINE HYDROCHLORIDE 20 MG/1
20 CAPSULE ORAL DAILY
Qty: 30 CAPSULE | Refills: 3 | Status: SHIPPED | OUTPATIENT
Start: 2023-12-29 | End: 2024-01-03
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
## 2024-01-03 ENCOUNTER — PATIENT MESSAGE (OUTPATIENT)
Dept: PRIMARY CARE CLINIC | Facility: CLINIC | Age: 24
End: 2024-01-03

## 2024-01-03 ENCOUNTER — OFFICE VISIT (OUTPATIENT)
Dept: PRIMARY CARE CLINIC | Facility: CLINIC | Age: 24
End: 2024-01-03
Payer: COMMERCIAL

## 2024-01-03 ENCOUNTER — HOSPITAL ENCOUNTER (OUTPATIENT)
Dept: RADIOLOGY | Facility: HOSPITAL | Age: 24
Discharge: HOME | End: 2024-01-03
Attending: NURSE PRACTITIONER
Payer: COMMERCIAL

## 2024-01-03 VITALS
OXYGEN SATURATION: 99 % | SYSTOLIC BLOOD PRESSURE: 118 MMHG | DIASTOLIC BLOOD PRESSURE: 72 MMHG | WEIGHT: 124.31 LBS | BODY MASS INDEX: 20.71 KG/M2 | HEIGHT: 65 IN | HEART RATE: 79 BPM

## 2024-01-03 DIAGNOSIS — F41.1 GENERALIZED ANXIETY DISORDER: ICD-10-CM

## 2024-01-03 DIAGNOSIS — R11.0 NAUSEA: ICD-10-CM

## 2024-01-03 DIAGNOSIS — R42 DIZZINESS: ICD-10-CM

## 2024-01-03 DIAGNOSIS — J06.9 UPPER RESPIRATORY TRACT INFECTION, UNSPECIFIED TYPE: Primary | ICD-10-CM

## 2024-01-03 PROCEDURE — 3074F SYST BP LT 130 MM HG: CPT | Mod: CPTII,S$GLB,, | Performed by: NURSE PRACTITIONER

## 2024-01-03 PROCEDURE — 1159F MED LIST DOCD IN RCRD: CPT | Mod: CPTII,S$GLB,, | Performed by: NURSE PRACTITIONER

## 2024-01-03 PROCEDURE — 74019 RADEX ABDOMEN 2 VIEWS: CPT | Mod: 26,,, | Performed by: RADIOLOGY

## 2024-01-03 PROCEDURE — 3078F DIAST BP <80 MM HG: CPT | Mod: CPTII,S$GLB,, | Performed by: NURSE PRACTITIONER

## 2024-01-03 PROCEDURE — 3008F BODY MASS INDEX DOCD: CPT | Mod: CPTII,S$GLB,, | Performed by: NURSE PRACTITIONER

## 2024-01-03 PROCEDURE — 99999 PR PBB SHADOW E&M-EST. PATIENT-LVL IV: CPT | Mod: PBBFAC,,, | Performed by: NURSE PRACTITIONER

## 2024-01-03 PROCEDURE — 99214 OFFICE O/P EST MOD 30 MIN: CPT | Mod: S$GLB,,, | Performed by: NURSE PRACTITIONER

## 2024-01-03 PROCEDURE — 74019 RADEX ABDOMEN 2 VIEWS: CPT | Mod: TC

## 2024-01-03 RX ORDER — FLUOXETINE HYDROCHLORIDE 20 MG/1
20 CAPSULE ORAL DAILY
COMMUNITY

## 2024-01-03 NOTE — PROGRESS NOTES
[facility] Note    Chief Complaint      Chief Complaint   Patient presents with    URI     C/o having on and off URI for 1 month. Went to  got ZPAK, after completed it came back and went back to . Got prednisone and amoxil. Finished steroids, currently on ABX     Nausea       History of Present Illness      Inez Escobar is a 23 y.o. female with chronic conditions of anxiety, bipolar disorder, depression,  who presents today for: pt complaining of having URI symptoms for 1 month. Went to  and was diagnosed with URI December given zpak, no improvement went back two weeks later and diagnosed with sinusitis and given prednisone and amoxicillin. Covid flu and strep were negative. Pt now saying has noticed some improvement, but has been more nauseous since. Appetite decreased. Pt does state she has nausea with her anxiety as well and was taking a probiotic. Has seen GI in the past. Pt does have Zofran at home, has not taken yet. Normal BM yesterday, but hard. No blood. No vomiting. No abdominal pain. No fever or chills. Pt also complains of intermittent vertigo for years, wants referral to ENT.    LMP: 12/14, had urine pregnancy test at urgent care reported negative.     Past Medical History:  Past Medical History:   Diagnosis Date    Anxiety     Bipolar 1 disorder     Depression     Hx of psychiatric care     Psychiatric exam requested by authority     Psychiatric problem     Therapy        Past Surgical History:   has no past surgical history on file.    Family History:  family history includes ADD / ADHD in her mother; Alcohol abuse in her father and paternal grandmother; Anxiety disorder in her father, maternal grandmother, and mother; Arrhythmia in her maternal grandmother; Bipolar disorder in her paternal grandfather; Congenital heart disease in her maternal grandmother; Depression in her father, maternal grandmother, and mother; Diabetes in her father, maternal grandmother, and  paternal grandmother; Drug abuse in her father, maternal aunt, and paternal grandmother; Emphysema in her maternal grandfather; Heart disease in her father and maternal grandmother; Mental illness in her father, maternal aunt, maternal aunt, maternal grandmother, and mother; Migraines in her mother; Miscarriages / Stillbirths in her mother; Multiple sclerosis in her paternal grandmother; No Known Problems in her brother and sister.     Social History:  Social History     Tobacco Use    Smoking status: Never    Smokeless tobacco: Never   Substance Use Topics    Alcohol use: Yes     Comment: rare, every few months    Drug use: Never       Review of Systems   Constitutional:  Negative for chills and fever.   Respiratory:  Negative for cough and shortness of breath.    Cardiovascular:  Negative for chest pain and palpitations.   Gastrointestinal:  Positive for nausea. Negative for constipation, diarrhea and vomiting.   Genitourinary:  Negative for dysuria and hematuria.   Musculoskeletal:  Negative for falls.   Neurological:  Negative for headaches.        Medications:  Outpatient Encounter Medications as of 1/3/2024   Medication Sig Dispense Refill    ondansetron (ZOFRAN) 8 MG tablet       valACYclovir (VALTREX) 500 MG tablet TAKE 1 TABLET BY MOUTH TWICE DAILY FOR 7 DAYS 30 tablet 1    FLUoxetine 20 MG capsule Take 20 mg by mouth once daily.      [DISCONTINUED] FLUoxetine 20 MG capsule Take 1 capsule (20 mg total) by mouth once daily. (Patient not taking: Reported on 1/3/2024) 30 capsule 3     Facility-Administered Encounter Medications as of 1/3/2024   Medication Dose Route Frequency Provider Last Rate Last Admin    levonorgestreL 20 mcg/24 hours (5 yrs) 52 mg IUD 1 Intra Uterine Device  1 Intra Uterine Device Karie Mcpherson MD   1 Intra Uterine Device at 09/23/20 1415       Allergies:  Review of patient's allergies indicates:  No Known Allergies    Health Maintenance:  Immunization History  "  Administered Date(s) Administered    DTaP 01/21/2003, 01/21/2003, 05/05/2003, 05/05/2003, 08/05/2003, 08/05/2003, 09/09/2004, 09/09/2004, 02/11/2008, 02/11/2008    HPV 9-Valent 08/01/2019, 08/01/2019, 09/02/2020, 09/02/2020, 06/14/2021    Hep B / HiB 2002, 2002, 03/13/2003, 03/13/2003, 11/17/2003, 11/17/2003    Hepatitis A, Pediatric/Adolescent, 2 Dose 08/01/2019, 08/01/2019, 09/04/2020    IPV 01/21/2003, 01/21/2003, 05/05/2003, 05/05/2003, 02/06/2004, 02/06/2004, 09/09/2004, 09/09/2004, 02/05/2009, 02/05/2009    MMR 02/06/2004, 02/06/2004, 02/11/2008, 02/11/2008    Meningococcal B, OMV 09/04/2020, 06/14/2021    Meningococcal Conjugate (MCV4P) 06/25/2014, 06/25/2014, 08/01/2019, 08/01/2019    Pneumococcal Conjugate - 7 Valent 2002, 2002, 03/13/2003, 03/13/2003, 07/23/2003, 07/23/2003, 09/09/2004, 09/09/2004    Tdap 06/25/2014, 06/25/2014    Varicella 11/17/2003, 11/17/2003, 02/11/2008, 02/11/2008      Health Maintenance   Topic Date Due    Hepatitis C Screening  Never done    Chlamydia Screening  12/22/2023    Pap Smear  11/05/2023    TETANUS VACCINE  06/25/2024    Lipid Panel  Completed    HPV Vaccines  Completed        Physical Exam      Vital Signs  Pulse: 79  SpO2: 99 %  BP: 118/72  Pain Score:   5  Pain Loc: Abdomen  Height and Weight  Height: 5' 5" (165.1 cm)  Weight: 56.4 kg (124 lb 5.4 oz)  BSA (Calculated - sq m): 1.61 sq meters  BMI (Calculated): 20.7  Weight in (lb) to have BMI = 25: 149.9]    Physical Exam  Constitutional:       Appearance: She is well-developed.   HENT:      Head: Normocephalic and atraumatic.      Right Ear: Tympanic membrane normal.      Left Ear: Tympanic membrane normal.      Nose: Nose normal.      Mouth/Throat:      Mouth: Mucous membranes are moist.   Eyes:      Pupils: Pupils are equal, round, and reactive to light.   Cardiovascular:      Rate and Rhythm: Normal rate and regular rhythm.      Heart sounds: Normal heart sounds. No murmur " heard.  Pulmonary:      Effort: Pulmonary effort is normal. No respiratory distress.      Breath sounds: Normal breath sounds.   Abdominal:      General: There is no distension.      Palpations: Abdomen is soft. There is no mass.      Tenderness: There is abdominal tenderness (generalized). There is no guarding or rebound.   Musculoskeletal:      Cervical back: Normal range of motion.   Skin:     General: Skin is warm and dry.   Neurological:      Mental Status: She is alert. Mental status is at baseline.   Psychiatric:         Behavior: Behavior normal.          Laboratory:  CBC:  Recent Labs   Lab 05/12/22  1003 01/10/23  0815   WBC 4.87 5.31   RBC 4.33 4.41   Hemoglobin 13.2 13.6   Hematocrit 39.4 38.9   Platelets 306 271   MCV 91 88   MCH 30.5 30.8   MCHC 33.5 35.0     CMP:  Recent Labs   Lab 05/12/22  1003 01/10/23  0815   Glucose 88 91   Calcium 9.7 9.1   Albumin 3.9 3.8   Total Protein 7.0 6.8   Sodium 137 138   Potassium 4.1 4.4   CO2 25 23   Chloride 104 108   BUN 10 14   Alkaline Phosphatase 71 68   ALT 33 13   AST 33 17   Total Bilirubin 0.5 0.7     URINALYSIS:       LIPIDS:  Recent Labs   Lab 05/12/22  1003 01/10/23  0815   TSH 0.778 1.427   HDL  --  54   Cholesterol  --  163   Triglycerides  --  60   LDL Cholesterol  --  97.0   HDL/Cholesterol Ratio  --  33.1   Non-HDL Cholesterol  --  109   Total Cholesterol/HDL Ratio  --  3.0     TSH:  Recent Labs   Lab 05/12/22  1003 01/10/23  0815   TSH 0.778 1.427     A1C:        Assessment/Plan     Inez Escobar is a 23 y.o.female with:    1. Upper respiratory tract infection, unspecified type  Has resolved per patient. No further medication at this time.     2. Nausea  - Has zofran at home. Will try to see if help improve symptoms.  - KUB to check for constipation as source to start, discussed further imaging if needed.   - recommended align probiotic   - ordered labs, check h.pylori   - will notify if not improving, did not want to see GI at this  time.   - did instruct to go to ER if vomiting, unable to have bowel movement, worsening abdominal pain.    3. Generalized Anxiety disorder  On Prozac. Stable. Continue follow up with specialists.     4. Dizziness  -ENT referral placed    Chronic conditions status updated as per HPI.  Other than changes above, cont current medications and maintain follow up with specialists.  No follow-ups on file.    Future Appointments   Date Time Provider Department Center   3/27/2024  3:00 PM Qing Samson NP [facility] [address]aya, FNP [facility]

## 2024-01-04 LAB — H PYLORI AG STL QL IA: NORMAL

## 2024-01-09 ENCOUNTER — PATIENT MESSAGE (OUTPATIENT)
Dept: PRIMARY CARE CLINIC | Facility: CLINIC | Age: 24
End: 2024-01-09
Payer: COMMERCIAL

## 2024-01-09 ENCOUNTER — DOCUMENTATION ONLY (OUTPATIENT)
Dept: AUDIOLOGY | Facility: CLINIC | Age: 24
End: 2024-01-09
Payer: COMMERCIAL

## 2024-01-09 ENCOUNTER — OFFICE VISIT (OUTPATIENT)
Dept: OTOLARYNGOLOGY | Facility: CLINIC | Age: 24
End: 2024-01-09
Payer: COMMERCIAL

## 2024-01-09 VITALS
HEIGHT: 65 IN | DIASTOLIC BLOOD PRESSURE: 69 MMHG | WEIGHT: 124.31 LBS | BODY MASS INDEX: 20.71 KG/M2 | SYSTOLIC BLOOD PRESSURE: 118 MMHG

## 2024-01-09 DIAGNOSIS — R42 DIZZINESS: ICD-10-CM

## 2024-01-09 DIAGNOSIS — H61.23 BILATERAL IMPACTED CERUMEN: Primary | ICD-10-CM

## 2024-01-09 DIAGNOSIS — M41.9 SCOLIOSIS OF THORACOLUMBAR SPINE, UNSPECIFIED SCOLIOSIS TYPE: Primary | ICD-10-CM

## 2024-01-09 PROCEDURE — 69210 REMOVE IMPACTED EAR WAX UNI: CPT | Mod: S$GLB,,, | Performed by: NURSE PRACTITIONER

## 2024-01-09 PROCEDURE — 1159F MED LIST DOCD IN RCRD: CPT | Mod: CPTII,S$GLB,, | Performed by: NURSE PRACTITIONER

## 2024-01-09 PROCEDURE — 3008F BODY MASS INDEX DOCD: CPT | Mod: CPTII,S$GLB,, | Performed by: NURSE PRACTITIONER

## 2024-01-09 PROCEDURE — 99203 OFFICE O/P NEW LOW 30 MIN: CPT | Mod: 25,S$GLB,, | Performed by: NURSE PRACTITIONER

## 2024-01-09 PROCEDURE — 99999 PR PBB SHADOW E&M-EST. PATIENT-LVL III: CPT | Mod: PBBFAC,,, | Performed by: NURSE PRACTITIONER

## 2024-01-09 PROCEDURE — 3074F SYST BP LT 130 MM HG: CPT | Mod: CPTII,S$GLB,, | Performed by: NURSE PRACTITIONER

## 2024-01-09 PROCEDURE — 1160F RVW MEDS BY RX/DR IN RCRD: CPT | Mod: CPTII,S$GLB,, | Performed by: NURSE PRACTITIONER

## 2024-01-09 PROCEDURE — 3078F DIAST BP <80 MM HG: CPT | Mod: CPTII,S$GLB,, | Performed by: NURSE PRACTITIONER

## 2024-01-09 RX ORDER — AMOXICILLIN AND CLAVULANATE POTASSIUM 875; 125 MG/1; MG/1
1 TABLET, FILM COATED ORAL 2 TIMES DAILY
COMMUNITY

## 2024-01-09 NOTE — PROGRESS NOTES
I sent a message to Kim Vu at [facility] to get the patient scheduled for a VNG and audiogram ordered by Edna Hackett in ENT. The patient requested to be scheduled there because she attends school on the [facility].

## 2024-01-09 NOTE — PROGRESS NOTES
"Subjective     Patient ID: Inez Escobar is a 23 y.o. female.    Chief Complaint: Dizziness    HPI  Patient is new to ENT, referred by LEONARDA Alfred NP for consultation for dizziness.   Patient saw LEONARDA Alfred 6 days ago: "23 y.o. female with chronic conditions of anxiety, bipolar disorder, depression, presents today for: pt complaining of having URI symptoms for 1 month. Went to  and was diagnosed with URI December given Zpak, no improvement went back two weeks later and diagnosed with sinusitis and given prednisone and amoxicillin. Covid flu and strep were negative. Pt now saying has noticed some improvement, but has been more nauseous since. Appetite decreased. Pt does state she has nausea with her anxiety as well and was taking a probiotic. Has seen GI in the past. Pt does have Zofran at home, has not taken yet. Normal BM yesterday, but hard. No blood. No vomiting. No abdominal pain. No fever or chills. Pt also complains of intermittent vertigo for years, wants referral to ENT."  Patient reports dizziness X 2 years. Adamantly denies any true vertigo; states there is no spinning or swirling sensation. Describes dizziness as motion sickness, everything slowly going in and out, associated with nausea and poor appetite. States she is not sure if related to long-COVID or "brain zaps" from Lexapro, but stopped taking Lexapro and still dizzy. H/o migraines; states she has been experiencing frequent headaches. Dizziness is not caused by motion but made worse by motion. Also made worse by looking at computer or phone screens. Dizzy spells last weeks to months, then subside. Both ears feel full.     Review of Systems   Constitutional:  Positive for appetite change.   HENT: Negative.     Eyes: Negative.    Respiratory: Negative.     Cardiovascular: Negative.    Gastrointestinal:  Positive for nausea.   Musculoskeletal: Negative.    Integumentary:  Negative.   Neurological:  Positive for dizziness and headaches. "   Hematological: Negative.    Psychiatric/Behavioral: Negative.            Objective     Physical Exam  Vitals and nursing note reviewed.   Constitutional:       General: She is not in acute distress.     Appearance: She is well-developed. She is not ill-appearing.   HENT:      Head: Normocephalic and atraumatic.      Right Ear: Hearing, tympanic membrane, ear canal and external ear normal. No middle ear effusion. Tympanic membrane is not erythematous.      Left Ear: Hearing, tympanic membrane, ear canal and external ear normal.  No middle ear effusion. Tympanic membrane is not erythematous.      Nose: Nose normal.   Eyes:      General: Lids are normal. No scleral icterus.        Right eye: No discharge.         Left eye: No discharge.   Neck:      Trachea: Trachea normal. No tracheal deviation.   Cardiovascular:      Rate and Rhythm: Normal rate.   Pulmonary:      Effort: Pulmonary effort is normal. No respiratory distress.      Breath sounds: No stridor. No wheezing.   Musculoskeletal:         General: Normal range of motion.      Cervical back: Normal range of motion and neck supple.   Skin:     General: Skin is warm and dry.   Neurological:      Mental Status: She is alert and oriented to person, place, and time.      Coordination: Coordination normal.      Gait: Gait normal.   Psychiatric:         Attention and Perception: Attention normal.         Mood and Affect: Mood normal.         Speech: Speech normal.         Behavior: Behavior normal. Behavior is cooperative.     SEPARATE PROCEDURE IN OFFICE:   Procedure: Removal of impacted cerumen, bilateral   Pre Procedure Diagnosis: Cerumen Impaction   Post Procedure Diagnosis: Cerumen Impaction   Verbal informed consent in regards to risk of trauma to ear canal, ear drum or hearing, discomfort during procedure and/or inability to remove cerumen impaction in one session or unforeseen events or complications.   No anesthesia.     Procedure in detail:   Ear canal  visualized bilateral with appropriate size ear speculum utilizing Operating Head Binocular Otomicroscope   Utilizing the following:  Ring curet, delicate alligator forceps, and/or suction cannula was used. The impacted cerumen of the ear canals was removed atraumatically. The TM and EAC were then inspected and found to be clear of wax. See description of TMs/EACs in PE above.   Complications: No   Condition: Improved/Good    Negative Tessa-Hallpike AU     Assessment and Plan     1. Bilateral impacted cerumen    2. Dizziness  -     Ambulatory referral/consult to ENT      Discussion regarding dysequilibrium versus true vertigo: VNG is an inner ear test as part of comprehensive vestibular system diagnostic testing, indicated for those patients experiencing true vertigo but it is of little to no yield unless vertiginous. No evidence of any BPPV at this time. Patient would like to proceed with VNG+Audiogram for comprehensive vestibular system diagnostic testing. She would like it done at [facility] as she will be living in [place] starting tomorrow where she attends [employer]. I have low suspicion of vestibular etiology and recommend neurology referral instead; however patient would like to rule out inner ear.        No follow-ups on file.

## 2024-01-09 NOTE — Clinical Note
Pt just informed me that she is moving back to [place] (attends [employer]) tomorrow to start Spring semester, so she would like VNG+Audio done at main [facility] rather than [facility]. Thank you!

## 2024-01-10 NOTE — PROGRESS NOTES
DATE: 1/11/2024  PATIENT: Inez Escobar    Supervising Physician: Cade Parker M.D.    CHIEF COMPLAINT: mid/low back pain    HISTORY:  Inez Escobar is a 23 y.o. female here for initial evaluation of mid/low back pain. Pt says the pain has been present for years. She describes the pain as pinching in her mid back and aching and stiff in her lower back. She reports radiating pain to the front of both thighs. Pt recently had an abdominal xray and was told she has scoliosis. Pt says this is the first time it was identified. There is no numbness/weakness/tingling.       The Patient denies myelopathic symptoms such as handwriting changes or difficulty with buttons/coins/keys. Denies perineal paresthesias, bowel/bladder dysfunction.      PAST MEDICAL/SURGICAL HISTORY:  Past Medical History:   Diagnosis Date    Anxiety     Bipolar 1 disorder     Depression     Hx of psychiatric care     Psychiatric exam requested by [facility]     Psychiatric problem     Therapy      History reviewed. No pertinent surgical history.      Current Medications:   Current Outpatient Medications:     amoxicillin-clavulanate 875-125mg (AUGMENTIN) 875-125 mg per tablet, Take 1 tablet by mouth 2 (two) times daily., Disp: , Rfl:     FLUoxetine 20 MG capsule, Take 20 mg by mouth once daily., Disp: , Rfl:     ondansetron (ZOFRAN) 8 MG tablet, , Disp: , Rfl:     valACYclovir (VALTREX) 500 MG tablet, TAKE 1 TABLET BY MOUTH TWICE DAILY FOR 7 DAYS, Disp: 30 tablet, Rfl: 1    Current Facility-Administered Medications:     levonorgestreL 20 mcg/24 hours (5 yrs) 52 mg IUD 1 Intra Uterine Device, 1 Intra Uterine Device, Intrauterine, , Karie Storey MD, 1 Intra Uterine Device at 09/23/20 1415    Social History:   Social History     Socioeconomic History    Marital status: Single   Tobacco Use    Smoking status: Never    Smokeless tobacco: Never   Substance and Sexual Activity    Alcohol use: Yes     Comment: rare, every few months     Drug use: Never    Sexual activity: Yes     Partners: Male     Birth control/protection: I.U.D.   Social History Narrative            Lives mom step dad and younger bro and sis    Dad younger bro and sis (MS and diabetes runs on this side of family)     Social Determinants of Health     Financial Resource Strain: Low Risk  (1/9/2024)    Overall Financial Resource Strain (CARDIA)     Difficulty of Paying Living Expenses: Not very hard   Food Insecurity: Food Insecurity Present (1/9/2024)    Hunger Vital Sign     Worried About Running Out of Food in the Last Year: Sometimes true     Ran Out of Food in the Last Year: Never true   Transportation Needs: No Transportation Needs (1/9/2024)    PRAPARE - Transportation     Lack of Transportation (Medical): No     Lack of Transportation (Non-Medical): No   Physical Activity: Sufficiently Active (1/9/2024)    Exercise Vital Sign     Days of Exercise per Week: 5 days     Minutes of Exercise per Session: 30 min   Stress: Stress Concern Present (1/9/2024)    [employer] - Occupational Stress Questionnaire     Feeling of Stress : To some extent   Social Connections: Unknown (1/9/2024)    Social Connection and Isolation Panel [NHANES]     Frequency of Communication with Friends and Family: More than three times a week     Frequency of Social Gatherings with Friends and Family: Three times a week     Active Member of Clubs or Organizations: Yes     Attends Club or Organization Meetings: More than 4 times per year     Marital Status: Never    Housing Stability: Unknown (1/9/2024)    Housing Stability Vital Sign     Unable to Pay for Housing in the Last Year: Patient declined     Number of Places Lived in the Last Year: 2     Unstable Housing in the Last Year: No       REVIEW OF SYSTEMS:  Constitution: Negative. Negative for chills, fever and night sweats.   Cardiovascular: Negative for chest pain and syncope.   Respiratory: Negative for cough and  "shortness of breath.   Gastrointestinal: See HPI. Negative for nausea/vomiting. Negative for abdominal pain.  Genitourinary: See HPI. Negative for discoloration or dysuria.  Skin: Negative for dry skin, itching and rash.   Hematologic/Lymphatic: Negative for bleeding problem. Does not bruise/bleed easily.   Musculoskeletal: Negative for falls and muscle weakness.   Neurological: See HPI. No seizures.   Endocrine: Negative for polydipsia, polyphagia and polyuria.   Allergic/Immunologic: Negative for hives and persistent infections.      EXAM:  Ht 5' 4" (1.626 m)   Wt 55.9 kg (123 lb 3.8 oz)   BMI 21.15 kg/m²     General: The patient is a very pleasant 23 y.o. female in no apparent distress, the patient is orientatied to person, place and time.  Psych: Normal mood and affect  HEENT: Vision grossly intact, hearing intact to the spoken word.  Lungs: Respirations unlabored.  Gait: Normal station and gait, no difficulty with toe or heel walk.   Skin: Skin on the neck, back, and axillae negative for rashes, lesions, cafe-au-lait spots, hairy patches and surgical scars.  Spinal Balance: Notable for balanced  Shoulder Balance: balanced  Pelvic Balance: balanced  Musculoskeletal: No pain with the range of motion of the bilateral hips. No trochanteric tenderness to palpation.  Vascular: Bilateral lower extremities warm and well perfused, Dorsalis pedis pulses 2+ bilaterally.  Neurological: Normal strength and tone in all major motor groups in the bilateral lower extremities. Normal ensation to light touch in the L2-S1 dermatomes bilaterally.  Deep tendon reflexes symmetric 2+ in the bilateral lower extremities.  Negative Babinski bilaterally. Straight leg raise negative bilaterally.    IMAGING:   Today I personally reviewed PA abdominal films that demonstrate mild thoracolumbar scoliosis.     ASSESSMENT/PLAN:    Inez was seen today for scoliosis.    Diagnoses and all orders for this visit:    Scoliosis of thoracolumbar " spine, unspecified scoliosis type  -     Ambulatory referral/consult to Orthopedics  -     Ambulatory referral/consult to Physical/Occupational Therapy; Future        Today we discussed at length all of the different treatment options including anti-inflammatories, acetaminophen, rest, ice, heat, physical therapy including strengthening and stretching exercises, home exercises, ROM, aerobic conditioning, aqua therapy, other modalities including ultrasound, massage, and dry needling, epidural steroid injections and finally surgical intervention.      Pt with mild scoliosis presents with chronic mid/low back pain. Will send PT orders to [facility]. Pt will fu if pain persists.

## 2024-01-11 ENCOUNTER — OFFICE VISIT (OUTPATIENT)
Dept: SPINE | Facility: CLINIC | Age: 24
End: 2024-01-11
Payer: COMMERCIAL

## 2024-01-11 ENCOUNTER — PATIENT MESSAGE (OUTPATIENT)
Dept: ORTHOPEDICS | Facility: CLINIC | Age: 24
End: 2024-01-11
Payer: COMMERCIAL

## 2024-01-11 VITALS — HEIGHT: 64 IN | WEIGHT: 123.25 LBS | BODY MASS INDEX: 21.04 KG/M2

## 2024-01-11 DIAGNOSIS — M41.9 SCOLIOSIS OF THORACOLUMBAR SPINE, UNSPECIFIED SCOLIOSIS TYPE: ICD-10-CM

## 2024-01-11 PROCEDURE — 1159F MED LIST DOCD IN RCRD: CPT | Mod: CPTII,S$GLB,, | Performed by: ORTHOPAEDIC SURGERY

## 2024-01-11 PROCEDURE — 99204 OFFICE O/P NEW MOD 45 MIN: CPT | Mod: S$GLB,,, | Performed by: ORTHOPAEDIC SURGERY

## 2024-01-11 PROCEDURE — 3008F BODY MASS INDEX DOCD: CPT | Mod: CPTII,S$GLB,, | Performed by: ORTHOPAEDIC SURGERY

## 2024-01-11 PROCEDURE — 99999 PR PBB SHADOW E&M-EST. PATIENT-LVL III: CPT | Mod: PBBFAC,,, | Performed by: ORTHOPAEDIC SURGERY

## 2024-01-19 ENCOUNTER — TELEPHONE (OUTPATIENT)
Dept: AUDIOLOGY | Facility: CLINIC | Age: 24
End: 2024-01-19
Payer: COMMERCIAL

## 2024-01-19 DIAGNOSIS — R42 DIZZINESS: Primary | ICD-10-CM

## 2024-01-25 ENCOUNTER — PATIENT MESSAGE (OUTPATIENT)
Dept: OBSTETRICS AND GYNECOLOGY | Facility: CLINIC | Age: 24
End: 2024-01-25
Payer: COMMERCIAL

## 2024-01-25 DIAGNOSIS — R10.2 PELVIC PAIN: Primary | ICD-10-CM

## 2024-01-26 ENCOUNTER — TELEPHONE (OUTPATIENT)
Dept: OBSTETRICS AND GYNECOLOGY | Facility: CLINIC | Age: 24
End: 2024-01-26
Payer: COMMERCIAL

## 2024-01-26 NOTE — TELEPHONE ENCOUNTER
1/26/24 @ 1138 spoke with pt offered us and appointment today. Pt will check with boss and call back if she can make it. 1:00 is on hold for ultrasound and visit on hold for Dr Storey.     Pain, started last night was very intense is better today. But would like to be seen sooner if possible.

## 2024-02-02 ENCOUNTER — CLINICAL SUPPORT (OUTPATIENT)
Dept: AUDIOLOGY | Facility: CLINIC | Age: 24
End: 2024-02-02
Payer: COMMERCIAL

## 2024-02-02 ENCOUNTER — HOSPITAL ENCOUNTER (OUTPATIENT)
Dept: RADIOLOGY | Facility: HOSPITAL | Age: 24
Discharge: HOME | End: 2024-02-02
Attending: OBSTETRICS & GYNECOLOGY
Payer: COMMERCIAL

## 2024-02-02 DIAGNOSIS — R42 DIZZINESS: ICD-10-CM

## 2024-02-02 DIAGNOSIS — H93.13 TINNITUS, BILATERAL: Primary | ICD-10-CM

## 2024-02-02 DIAGNOSIS — H81.8X9 OTHER DISORDERS OF VESTIBULAR FUNCTION, UNSPECIFIED EAR: Primary | ICD-10-CM

## 2024-02-02 DIAGNOSIS — R10.2 PELVIC PAIN: ICD-10-CM

## 2024-02-02 PROCEDURE — 76830 TRANSVAGINAL US NON-OB: CPT | Mod: TC

## 2024-02-02 PROCEDURE — 92537 CALORIC VSTBLR TEST W/REC: CPT | Mod: S$GLB,,, | Performed by: AUDIOLOGIST

## 2024-02-02 PROCEDURE — 99999 PR PBB SHADOW E&M-EST. PATIENT-LVL II: CPT | Mod: PBBFAC,,, | Performed by: AUDIOLOGIST

## 2024-02-02 PROCEDURE — 92557 COMPREHENSIVE HEARING TEST: CPT | Mod: S$GLB,,, | Performed by: AUDIOLOGIST

## 2024-02-02 PROCEDURE — 76830 TRANSVAGINAL US NON-OB: CPT | Mod: 26,,, | Performed by: RADIOLOGY

## 2024-02-02 PROCEDURE — 92567 TYMPANOMETRY: CPT | Mod: S$GLB,,, | Performed by: AUDIOLOGIST

## 2024-02-02 PROCEDURE — 92540 BASIC VESTIBULAR EVALUATION: CPT | Mod: S$GLB,,, | Performed by: AUDIOLOGIST

## 2024-02-02 PROCEDURE — 76856 US EXAM PELVIC COMPLETE: CPT | Mod: 26,,, | Performed by: RADIOLOGY

## 2024-02-02 NOTE — PROGRESS NOTES
"VNG EVALUATION    Referring provider:  ISSA Hackett NP    Inez Escobar, 23 y.o. female, was seen for a vestibular evaluation.  Patient reported dizziness that started about 3 years ago. Dizziness was described as lightheadedness and feeling of pressure "going in and out". Patient denied true vertigo. Patient reported dizziness is accompanied by nausea and occasional vomiting. Last episode of dizziness was reported to occur about a month ago. Dizziness is not associated with any specific movements or situations, reported to occur randomly. Patient has a history of migraines that occur more than twice per week. Headaches are reported to be accompanied by light sensitivity. Patient is currently taking Prozac for the last month.     Audiologic evaluation completed today revealed normal hearing sensitivity bilaterally.    Gaze nystagmus was absent.  Oculomotor function was normal.  Spontaneous nystagmus was absent    The head-hanging left Hallpike revealed no nystagmus.   The head-hanging right Hallpike revealed no nystagmus.  Static positional testing revealed clinically insignificant left beating nystagmus of 2 d/s in the supine position.    Unilateral weakness: 19% (right)  Directional preponderance 8% (left beating)  RW: 18 d/s  LW: 33 d/s  RC: 26 d/s   d/s  Fixation suppression was normal.    Impression: Normal VNG; no evidence of vestibular system dysfunction including BPPV      Recommend:   Consider referral to Neurology for migraines  Follow-up with ISSA Hackett NP to review the results of today's evaluation      "

## 2024-02-02 NOTE — PROGRESS NOTES
Inez Escobar, a 23 y.o. female, was seen today in the clinic for an audiologic evaluation.  Patient's main complaint was dizziness.  Patient reported dizziness described as lightheadedness accompanied by nausea/vomiting that started about 3 years ago. Patient reported occasional non-bothersome tinnitus.     Tympanometry revealed Type A in the right ear and Type A in the left ear. Audiogram results revealed normal hearing sensitivity bilaterally. Speech reception thresholds were noted at 5 dB in the right ear and 5 dB in the left ear.  Speech discrimination scores were 100% in the right ear and 100% in the left ear.    Recommendations:  Otologic evaluation  Repeat audiogram as needed  Hearing protection when in noise

## 2024-02-05 ENCOUNTER — PATIENT MESSAGE (OUTPATIENT)
Dept: OTOLARYNGOLOGY | Facility: CLINIC | Age: 24
End: 2024-02-05
Payer: COMMERCIAL

## 2024-02-05 ENCOUNTER — PATIENT MESSAGE (OUTPATIENT)
Dept: OBSTETRICS AND GYNECOLOGY | Facility: CLINIC | Age: 24
End: 2024-02-05
Payer: COMMERCIAL

## 2024-02-05 DIAGNOSIS — N83.209 CYST OF OVARY, UNSPECIFIED LATERALITY: Primary | ICD-10-CM

## 2024-02-10 NOTE — Clinical Note
Hi! Was just checking in about this mutual patient. She had a colonoscopy with you 2 days ago that went fine and she felt fine. Then the next day she had sex and had pain. Her bilateral labia are very swollen. No swelling or anal area. It looks like an allergic reaction of some kind. Better today than yesterday. Just checking... do you all prep the vaginal area with betadine or anything prior to colonoscopy? I can't think of any other reason. It is getting better on its own, so not concerned.  Thanks, Karie 4 = No assist / stand by assistance

## 2024-03-28 ENCOUNTER — PATIENT MESSAGE (OUTPATIENT)
Dept: OBSTETRICS AND GYNECOLOGY | Facility: CLINIC | Age: 24
End: 2024-03-28
Payer: COMMERCIAL

## 2024-03-28 ENCOUNTER — HOSPITAL ENCOUNTER (OUTPATIENT)
Dept: RADIOLOGY | Facility: HOSPITAL | Age: 24
Discharge: HOME | End: 2024-03-28
Attending: OBSTETRICS & GYNECOLOGY
Payer: COMMERCIAL

## 2024-03-28 DIAGNOSIS — N83.209 CYST OF OVARY, UNSPECIFIED LATERALITY: ICD-10-CM

## 2024-03-28 PROCEDURE — 76856 US EXAM PELVIC COMPLETE: CPT | Mod: 26,,, | Performed by: RADIOLOGY

## 2024-03-28 PROCEDURE — 76830 TRANSVAGINAL US NON-OB: CPT | Mod: 26,,, | Performed by: RADIOLOGY

## 2024-03-28 PROCEDURE — 76830 TRANSVAGINAL US NON-OB: CPT | Mod: TC

## 2024-05-01 ENCOUNTER — PATIENT MESSAGE (OUTPATIENT)
Dept: PSYCHIATRY | Facility: CLINIC | Age: 24
End: 2024-05-01
Payer: COMMERCIAL

## 2024-05-02 ENCOUNTER — E-VISIT (OUTPATIENT)
Dept: OBSTETRICS AND GYNECOLOGY | Facility: CLINIC | Age: 24
End: 2024-05-02
Payer: COMMERCIAL

## 2024-05-02 DIAGNOSIS — B37.31 VAGINAL CANDIDIASIS: Primary | ICD-10-CM

## 2024-05-02 PROCEDURE — 99499 UNLISTED E&M SERVICE: CPT | Mod: ,,, | Performed by: OBSTETRICS & GYNECOLOGY

## 2024-05-03 RX ORDER — FLUCONAZOLE 150 MG/1
TABLET ORAL
Qty: 2 TABLET | Refills: 0 | Status: SHIPPED | OUTPATIENT
Start: 2024-05-03 | End: 2024-06-05

## 2024-05-03 NOTE — PROGRESS NOTES
Patient ID: Inez Escobar is a 23 y.o. female.    Chief Complaint: Vaginal Discharge (Entered automatically based on patient selection in Patient Portal.)    The patient initiated a request through [employer] on 5/2/2024 for evaluation and management with a chief complaint of Vaginal Discharge (Entered automatically based on patient selection in Patient Portal.)     I evaluated the questionnaire submission on 5/3/24.    Ohs Peq Evisit Vaginal Discharge    5/2/2024  7:07 PM CDT - Filed by Patient   Do you agree to participate in an E-Visit? Yes   If you have any of the following symptoms,  please do not complete an E-Visit,  schedule an appointment with your provider: I acknowledge   What is the main issue you would like addressed today? Itching, discharge   Are you able to take your vital signs? No   Are you pregnant, could you be pregnant, or are you breast feeding? None of the above   Which of the following are you experiencing? Vaginal Itching;  Vaginal discharge;  Vaginal bleeding    Are you having pain while passing urine? No, I have no pain while urinating.   Which of the following applies to your vaginal discharge? I have a white/milky discharge.;  I have a yellow/green discharge.    Which of the following are you experiencing? Pain on intercourse   Do you have any sores on your genitals? No    Have you taken antibiotics recently? I have not been on any antibiotics    Do you use any of the following? None of the above   Which of the following applies to your menstrual period? I had a menstrual period in the last 2 weeks.   Have you had similar symptoms in the past? Yes, I have these symptoms frequently.   When you had similar symptoms in the past, did any of the following work? Pills for yeast infection   Have you had a fever? No   During the last 2 months, have you had sexual contact with a specific person for the first time? No   Provide any additional information you feel is important.    Please  attach any relevant images or files          Encounter Diagnosis   Name Primary?    Vaginal candidiasis Yes        No orders of the defined types were placed in this encounter.     Medications Ordered This Encounter   Medications    fluconazole (DIFLUCAN) 150 MG Tab     Si pod QD and if not better in 3 days take a second pill     Dispense:  2 tablet     Refill:  0        No follow-ups on file.      E-Visit Time Tracking:    Day 1 Time (in minutes): 6    Total Time (in minutes): 6

## 2024-05-06 ENCOUNTER — TELEPHONE (OUTPATIENT)
Dept: OBSTETRICS AND GYNECOLOGY | Facility: CLINIC | Age: 24
End: 2024-05-06
Payer: COMMERCIAL

## 2024-05-09 ENCOUNTER — PATIENT MESSAGE (OUTPATIENT)
Dept: OBSTETRICS AND GYNECOLOGY | Facility: CLINIC | Age: 24
End: 2024-05-09

## 2024-05-09 ENCOUNTER — OFFICE VISIT (OUTPATIENT)
Dept: OBSTETRICS AND GYNECOLOGY | Facility: CLINIC | Age: 24
End: 2024-05-09
Attending: OBSTETRICS & GYNECOLOGY
Payer: COMMERCIAL

## 2024-05-09 VITALS
SYSTOLIC BLOOD PRESSURE: 108 MMHG | DIASTOLIC BLOOD PRESSURE: 70 MMHG | HEIGHT: 64 IN | BODY MASS INDEX: 20.68 KG/M2 | WEIGHT: 121.13 LBS

## 2024-05-09 DIAGNOSIS — N89.8 VAGINAL DISCHARGE: Primary | ICD-10-CM

## 2024-05-09 DIAGNOSIS — Z87.898 HISTORY OF SYNCOPE: ICD-10-CM

## 2024-05-09 DIAGNOSIS — R19.8 PAIN WITH BOWEL MOVEMENTS: ICD-10-CM

## 2024-05-09 DIAGNOSIS — Z11.3 SCREENING EXAMINATION FOR STD (SEXUALLY TRANSMITTED DISEASE): ICD-10-CM

## 2024-05-09 DIAGNOSIS — N94.10 DYSPAREUNIA IN FEMALE: ICD-10-CM

## 2024-05-09 PROCEDURE — 99214 OFFICE O/P EST MOD 30 MIN: CPT | Mod: S$GLB,,, | Performed by: OBSTETRICS & GYNECOLOGY

## 2024-05-09 PROCEDURE — 3008F BODY MASS INDEX DOCD: CPT | Mod: CPTII,S$GLB,, | Performed by: OBSTETRICS & GYNECOLOGY

## 2024-05-09 PROCEDURE — 1159F MED LIST DOCD IN RCRD: CPT | Mod: CPTII,S$GLB,, | Performed by: OBSTETRICS & GYNECOLOGY

## 2024-05-09 PROCEDURE — 3078F DIAST BP <80 MM HG: CPT | Mod: CPTII,S$GLB,, | Performed by: OBSTETRICS & GYNECOLOGY

## 2024-05-09 PROCEDURE — 3074F SYST BP LT 130 MM HG: CPT | Mod: CPTII,S$GLB,, | Performed by: OBSTETRICS & GYNECOLOGY

## 2024-05-09 PROCEDURE — 87491 CHLMYD TRACH DNA AMP PROBE: CPT | Performed by: OBSTETRICS & GYNECOLOGY

## 2024-05-09 PROCEDURE — 99999 PR PBB SHADOW E&M-EST. PATIENT-LVL III: CPT | Mod: PBBFAC,,, | Performed by: OBSTETRICS & GYNECOLOGY

## 2024-05-09 PROCEDURE — 81514 NFCT DS BV&VAGINITIS DNA ALG: CPT | Performed by: OBSTETRICS & GYNECOLOGY
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
## 2024-05-11 LAB
BACTERIAL VAGINOSIS DNA: POSITIVE
C TRACH DNA SPEC QL NAA+PROBE: NOT DETECTED
CANDIDA GLABRATA DNA: NEGATIVE
CANDIDA KRUSEI DNA: NEGATIVE
CANDIDA RRNA VAG QL PROBE: NEGATIVE
N GONORRHOEA DNA SPEC QL NAA+PROBE: NOT DETECTED
T VAGINALIS RRNA GENITAL QL PROBE: NEGATIVE

## 2024-05-14 RX ORDER — METRONIDAZOLE 500 MG/1
500 TABLET ORAL EVERY 12 HOURS
Qty: 14 TABLET | Refills: 0 | Status: SHIPPED | OUTPATIENT
Start: 2024-05-14 | End: 2024-05-21

## 2024-05-15 ENCOUNTER — PATIENT MESSAGE (OUTPATIENT)
Dept: OBSTETRICS AND GYNECOLOGY | Facility: CLINIC | Age: 24
End: 2024-05-15
Payer: COMMERCIAL

## 2024-05-23 ENCOUNTER — CLINICAL SUPPORT (OUTPATIENT)
Dept: REHABILITATION | Facility: HOSPITAL | Age: 24
End: 2024-05-23
Attending: OBSTETRICS & GYNECOLOGY
Payer: COMMERCIAL

## 2024-05-23 DIAGNOSIS — R19.8 PAIN WITH BOWEL MOVEMENTS: ICD-10-CM

## 2024-05-23 DIAGNOSIS — N94.10 DYSPAREUNIA IN FEMALE: ICD-10-CM

## 2024-05-23 DIAGNOSIS — Z87.898 HISTORY OF SYNCOPE: ICD-10-CM

## 2024-05-23 PROCEDURE — 97530 THERAPEUTIC ACTIVITIES: CPT | Mod: PO

## 2024-05-23 PROCEDURE — 97162 PT EVAL MOD COMPLEX 30 MIN: CPT | Mod: PO

## 2024-05-23 NOTE — PROGRESS NOTES
[facility]  Pelvic Health Physical Therapy Initial Evaluation    Date: 2024   Name: Inez Escobar  Clinic Number: [id number]  Therapy Diagnosis:   Encounter Diagnoses   Name Primary?    Dyspareunia in female     Pain with bowel movements     History of syncope      Physician: Karie Storey MD    Physician Orders: PT Eval and Treat   Medical Diagnosis from Referral: Dyspareunia in female [N94.10], Pain with bowel movements [R19.8], History of syncope [Z87.898]   Evaluation Date: 2024  Authorization Period Expiration: 2025  Plan of Care Expiration: 2024  Visit # / Visits authorized:     FOTO 3 on 2024      Time In: 2:00  Time Out: 2:45  Total Appointment Time (timed & untimed codes): 45 minutes    Precautions: universal    Subjective     Date of onset: chronic     History of current condition - Inez reports: pain with bowel movements a few years ago. OB thought maybe endometriosis but not sure because symptoms don't always line up with her cycle.   Also has had pain with sex over the last few years. Not sure when exactly it started, but she's had the same partner for the last 3 years and she has pain often.     OB/GYN History:   Sexually active? Yes  Pain with vaginal exams, intercourse or tampon use? Yes only remembers having pain with this boyfriend. Both initial and deep penetration. Gets frequent yeast infections, so she thinks the initial penetration pain may be related to that, but still has pain most of the time even without a yeast infection. Not able to orgasm from sex, but when she does there's no pain.   Has Mirena IUD - has had it for 3 years (4 years in August).   Pain with speculum exam   No pain with tampons.     Taking a probiotic she found on [employer] that's dairy free and supposed to be good for vaginal health     Pain:  See above    Bladder/Bowel History:   Bladder leakage: No  Urinary Urgency: No  Frequency of bowel movements:  every few days, but not usually a full bowel movement because she's usually constipated. Lately she's been eating healthier and exercising which improved bowel function but still she has painful bowel movements sometimes (lately has been happening every few weeks). Has a stool to elevate her feet.   Difficulty initiating BM: No - has some urgency   Quality/Shape of BM: [place] Stool Chart type 1 or 2 (mostly 1)   Does Patient Feel Empty after BM? No  Fiber Supplements or Laxative Use? Has tried Miralax, doesn't usually help (but she'll still take it for a couple days to give it a try). She's tried OTC laxatives (doesn't remember name) and that helped that night but it was really painful and she had no control so only took it once. Was also on Linzess for a bit, just felt it made her stomach hurt worse and gave her more gas, didn't help with actually having bowel movements.   Colon leakage: Yes  Frequency of incidents: every few months - usually happens when she's having more stomach pains, she's aware of it happening and is able to go to the bathroom.    Amount leaked (bowels): streaking/staining       Medical History: Inez  has a past medical history of Anxiety, Bipolar 1 disorder, Depression, psychiatric care, Psychiatric exam requested by authority, Psychiatric problem, and Therapy.     Surgical History: Inez Escobar  has no past surgical history on file.    Medications: Inez has a current medication list which includes the following prescription(s): amoxicillin-clavulanate 875-125mg, fluconazole, fluoxetine, ondansetron, and valacyclovir, and the following Facility-Administered Medications: levonorgestrel.    Allergies: Review of patient's allergies indicates:  No Known Allergies       Prior Therapy/Previous treatment included: none   Current exercise: was running a lot, then started going to the gym with a friend to do whatever workouts she was doing (weights, etc). Hasn't been working out  "the last few weeks, just got home for summer break. Back for the first 2 weeks of June, then in [place] the last 2 weeks of June.   Occupation: college student   Prior Level of Function: independent   Current Level of Function: see above     Types of fluid intake: water: has a 40 oz cup that she fills up approx 3 times/day     Pts goals: resolve pain     OBJECTIVE     Informed verbal consent provided 5/23/2024 prior to intravaginal exam.  Chaperone: declined     VAGINAL PELVIC FLOOR EXAM    EXTERNAL ASSESSMENT  Introitus: WNL  Skin condition: WNL  Scarring: none   Sensation: WNL   Pain: tenderness to palpation over left bulbocavernosus and right superficial transverse perineal   Voluntary contraction: visible lift  Voluntary relaxation: visible drop  Involuntary contraction: nil  Bearing down: slight drop   Perineal descent: absent      INTERNAL ASSESSMENT  Pain: trigger points as follows: throughout - patient reports "burning" sensation with light palpation    Sensation: able to localized pressure appropriately   Vaginal vault: stenotic   Muscle Bulk: severe hypertonus   Muscle Power: 1/5     Quality of contraction: decreased hold and incomplete relaxation   Specificity: patient contracts: adductors    Coordination: tends to hold breath during PFM contration   Prolapse check: unable to perform adequate bearing down - even with verbal cues for eccentric transverse abdominis and exhale       TREATMENT     Total Treatment time (time-based codes) separate from Evaluation: 30 minutes      Therapeutic Activity Patient participated in dynamic functional therapeutic activities to improve functional performance for 30 minutes. Including: Education as described below:     Patient Education provided:   general anatomy/physiology of urinary/ bowel  system and benefits of treatment were discussed with the pt. Additionally, anatomy/physiology of pelvic floor, diaphragmatic breathing, proper bearing down techniques, and " behavior modifications were reviewed.     Home Exercises provided:  Written Home Exercises provided: yes.  Exercises were reviewed and Inez was able to demonstrate them prior to the end of the session.    Inez demonstrated good  understanding of the education provided.     See EMR under Patient Instructions for exercises provided 5/23/2024.    Assessment     Inez is a 23 y.o. female referred to outpatient Physical Therapy with a medical diagnosis of Dyspareunia in female [N94.10], Pain with bowel movements [R19.8], History of syncope [Z87.898] . Pt presents with decreased pelvic muscle strength, decreased endurance of the pelvic muscles, decreased phasic ability of the pelvic muscles, increased tension of the pelvic muscles, poor quality of pelvic muscle contraction, poor coordination of pelvic floor muscles during ADL's leading to urinary or fecal leakage, dysfunctional defecation, and unable to co-contract or co-relax abdominal wall and pelvic floor muscles.      Pt prognosis is Good.   Pt will benefit from skilled outpatient Physical Therapy to address the deficits stated above and in the chart below, provide pt/family education, and to maximize pt's level of independence.     Plan of care discussed with patient: Yes  Pt's spiritual, cultural and educational needs considered and patient is agreeable to the plan of care and goals as stated below:     Anticipated Barriers for therapy: school schedule     Medical Necessity is demonstrated by the following  History  Co-morbidities and personal factors that may impact the plan of care [] LOW: no personal factors / co-morbidities  [x] MODERATE: 1-2 personal factors / co-morbidities  [] HIGH: 3+ personal factors / co-morbidities    Moderate / High Support Documentation:   Co-morbidities affecting plan of care:  has a past medical history of Anxiety, Bipolar 1 disorder, Depression, psychiatric care, Psychiatric exam requested by authority, Psychiatric  problem, and Therapy.     Personal Factors:   no deficits     Examination  Body Structures and Functions, activity limitations and participation restrictions that may impact the plan of care [] LOW: addressing 1-2 elements  [x] MODERATE: 3+ elements  [] HIGH: 4+ elements (please support below)    Moderate / High Support Documentation: see evaluation     Clinical Presentation [] LOW: stable  [x] MODERATE: Evolving  [] HIGH: Unstable     Decision Making/ Complexity Score: moderate         Goals:  Short Term Goals: 6 weeks   - Pt will demonstrate excellent knowledge and adherence to HEP to facilitate optimal recovery.  - Pt will demonstrate proper PFM contraction, relaxation, and lengthening coordinated with TA and breath for improved muscle coordination needed for functional activity.    Long Term Goals: 12 weeks   - Pt will demonstrate excellent knowledge and adherence to HEP for continued self-maintenance of symptoms.  - Pt will report FOTO score of 10% improvement or more indicating clinically relevant increase in function.  - Pt will demonstrate PFM strength of at least 3/5 MMT for improved strength needed to maintain continence.   - Pt will report bearing down appropriately 100% of the time for improved bowel function and decreased stress on adjacent pelvic structures.   - Pt will demonstrate independence with pressure-management strategies to decreased stress on adjacent pelvic structures.   - Pt will be able to successfully complete sexual intercourse without pain for improved ADL tolerance.   - Pt will report ability to tolerate speculum exam without pain for improved access to healthcare.      Plan     Plan of care Certification: 5/23/2024 to 8/23/2024.    Outpatient Physical Therapy 1-2 times weekly for 12 weeks to include the following interventions: therapeutic exercises, therapeutic activity, neuromuscular re-education, manual therapy, modalities PRN, patient/family education, dry needling, and self  care/home management    Manjula Sesay, PT, DPT, WCS

## 2024-05-27 ENCOUNTER — CLINICAL SUPPORT (OUTPATIENT)
Dept: REHABILITATION | Facility: HOSPITAL | Age: 24
End: 2024-05-27
Payer: COMMERCIAL

## 2024-05-27 DIAGNOSIS — M62.89 HIGH-TONE PELVIC FLOOR DYSFUNCTION: Primary | ICD-10-CM

## 2024-05-27 PROCEDURE — 97112 NEUROMUSCULAR REEDUCATION: CPT | Mod: PO

## 2024-05-27 PROCEDURE — 97140 MANUAL THERAPY 1/> REGIONS: CPT | Mod: PO

## 2024-05-27 NOTE — PLAN OF CARE
[facility]  Pelvic Health Physical Therapy Initial Evaluation    Date: 2024   Name: Inez Escobar  Clinic Number: [id number]  Therapy Diagnosis:   Encounter Diagnoses   Name Primary?    Dyspareunia in female     Pain with bowel movements     History of syncope      Physician: Karie Storey MD    Physician Orders: PT Eval and Treat   Medical Diagnosis from Referral: Dyspareunia in female [N94.10], Pain with bowel movements [R19.8], History of syncope [Z87.898]   Evaluation Date: 2024  Authorization Period Expiration: 2025  Plan of Care Expiration: 2024  Visit # / Visits authorized:     FOTO 3 on 2024      Time In: 2:00  Time Out: 2:45  Total Appointment Time (timed & untimed codes): 45 minutes    Precautions: universal    Subjective     Date of onset: chronic     History of current condition - Inez reports: pain with bowel movements a few years ago. OB thought maybe endometriosis but not sure because symptoms don't always line up with her cycle.   Also has had pain with sex over the last few years. Not sure when exactly it started, but she's had the same partner for the last 3 years and she has pain often.     OB/GYN History:   Sexually active? Yes  Pain with vaginal exams, intercourse or tampon use? Yes only remembers having pain with this boyfriend. Both initial and deep penetration. Gets frequent yeast infections, so she thinks the initial penetration pain may be related to that, but still has pain most of the time even without a yeast infection. Not able to orgasm from sex, but when she does there's no pain.   Has Mirena IUD - has had it for 3 years (4 years in August).   Pain with speculum exam   No pain with tampons.     Taking a probiotic she found on [employer] that's dairy free and supposed to be good for vaginal health     Pain:  See above    Bladder/Bowel History:   Bladder leakage: No  Urinary Urgency: No  Frequency of bowel movements:  every few days, but not usually a full bowel movement because she's usually constipated. Lately she's been eating healthier and exercising which improved bowel function but still she has painful bowel movements sometimes (lately has been happening every few weeks). Has a stool to elevate her feet.   Difficulty initiating BM: No - has some urgency   Quality/Shape of BM: [place] Stool Chart type 1 or 2 (mostly 1)   Does Patient Feel Empty after BM? No  Fiber Supplements or Laxative Use? Has tried Miralax, doesn't usually help (but she'll still take it for a couple days to give it a try). She's tried OTC laxatives (doesn't remember name) and that helped that night but it was really painful and she had no control so only took it once. Was also on Linzess for a bit, just felt it made her stomach hurt worse and gave her more gas, didn't help with actually having bowel movements.   Colon leakage: Yes  Frequency of incidents: every few months - usually happens when she's having more stomach pains, she's aware of it happening and is able to go to the bathroom.    Amount leaked (bowels): streaking/staining       Medical History: Inez  has a past medical history of Anxiety, Bipolar 1 disorder, Depression, psychiatric care, Psychiatric exam requested by authority, Psychiatric problem, and Therapy.     Surgical History: Inez Escobar  has no past surgical history on file.    Medications: Inez has a current medication list which includes the following prescription(s): amoxicillin-clavulanate 875-125mg, fluconazole, fluoxetine, ondansetron, and valacyclovir, and the following Facility-Administered Medications: levonorgestrel.    Allergies: Review of patient's allergies indicates:  No Known Allergies       Prior Therapy/Previous treatment included: none   Current exercise: was running a lot, then started going to the gym with a friend to do whatever workouts she was doing (weights, etc). Hasn't been working out  "the last few weeks, just got home for summer break. Back for the first 2 weeks of June, then in [place] the last 2 weeks of June.   Occupation: college student   Prior Level of Function: independent   Current Level of Function: see above     Types of fluid intake: water: has a 40 oz cup that she fills up approx 3 times/day     Pts goals: resolve pain     OBJECTIVE     Informed verbal consent provided 5/23/2024 prior to intravaginal exam.  Chaperone: declined     VAGINAL PELVIC FLOOR EXAM    EXTERNAL ASSESSMENT  Introitus: WNL  Skin condition: WNL  Scarring: none   Sensation: WNL   Pain: tenderness to palpation over left bulbocavernosus and right superficial transverse perineal   Voluntary contraction: visible lift  Voluntary relaxation: visible drop  Involuntary contraction: nil  Bearing down: slight drop   Perineal descent: absent      INTERNAL ASSESSMENT  Pain: trigger points as follows: throughout - patient reports "burning" sensation with light palpation    Sensation: able to localized pressure appropriately   Vaginal vault: stenotic   Muscle Bulk: severe hypertonus   Muscle Power: 1/5     Quality of contraction: decreased hold and incomplete relaxation   Specificity: patient contracts: adductors    Coordination: tends to hold breath during PFM contration   Prolapse check: unable to perform adequate bearing down - even with verbal cues for eccentric transverse abdominis and exhale       TREATMENT     Total Treatment time (time-based codes) separate from Evaluation: 30 minutes      Therapeutic Activity Patient participated in dynamic functional therapeutic activities to improve functional performance for 30 minutes. Including: Education as described below:     Patient Education provided:   general anatomy/physiology of urinary/ bowel  system and benefits of treatment were discussed with the pt. Additionally, anatomy/physiology of pelvic floor, diaphragmatic breathing, proper bearing down techniques, and " behavior modifications were reviewed.     Home Exercises provided:  Written Home Exercises provided: yes.  Exercises were reviewed and Inez was able to demonstrate them prior to the end of the session.    Inez demonstrated good  understanding of the education provided.     See EMR under Patient Instructions for exercises provided 5/23/2024.    Assessment     Inez is a 23 y.o. female referred to outpatient Physical Therapy with a medical diagnosis of Dyspareunia in female [N94.10], Pain with bowel movements [R19.8], History of syncope [Z87.898] . Pt presents with decreased pelvic muscle strength, decreased endurance of the pelvic muscles, decreased phasic ability of the pelvic muscles, increased tension of the pelvic muscles, poor quality of pelvic muscle contraction, poor coordination of pelvic floor muscles during ADL's leading to urinary or fecal leakage, dysfunctional defecation, and unable to co-contract or co-relax abdominal wall and pelvic floor muscles.      Pt prognosis is Good.   Pt will benefit from skilled outpatient Physical Therapy to address the deficits stated above and in the chart below, provide pt/family education, and to maximize pt's level of independence.     Plan of care discussed with patient: Yes  Pt's spiritual, cultural and educational needs considered and patient is agreeable to the plan of care and goals as stated below:     Anticipated Barriers for therapy: school schedule     Medical Necessity is demonstrated by the following  History  Co-morbidities and personal factors that may impact the plan of care [] LOW: no personal factors / co-morbidities  [x] MODERATE: 1-2 personal factors / co-morbidities  [] HIGH: 3+ personal factors / co-morbidities    Moderate / High Support Documentation:   Co-morbidities affecting plan of care:  has a past medical history of Anxiety, Bipolar 1 disorder, Depression, psychiatric care, Psychiatric exam requested by authority, Psychiatric  problem, and Therapy.     Personal Factors:   no deficits     Examination  Body Structures and Functions, activity limitations and participation restrictions that may impact the plan of care [] LOW: addressing 1-2 elements  [x] MODERATE: 3+ elements  [] HIGH: 4+ elements (please support below)    Moderate / High Support Documentation: see evaluation     Clinical Presentation [] LOW: stable  [x] MODERATE: Evolving  [] HIGH: Unstable     Decision Making/ Complexity Score: moderate         Goals:  Short Term Goals: 6 weeks   - Pt will demonstrate excellent knowledge and adherence to HEP to facilitate optimal recovery.  - Pt will demonstrate proper PFM contraction, relaxation, and lengthening coordinated with TA and breath for improved muscle coordination needed for functional activity.    Long Term Goals: 12 weeks   - Pt will demonstrate excellent knowledge and adherence to HEP for continued self-maintenance of symptoms.  - Pt will report FOTO score of 10% improvement or more indicating clinically relevant increase in function.  - Pt will demonstrate PFM strength of at least 3/5 MMT for improved strength needed to maintain continence.   - Pt will report bearing down appropriately 100% of the time for improved bowel function and decreased stress on adjacent pelvic structures.   - Pt will demonstrate independence with pressure-management strategies to decreased stress on adjacent pelvic structures.   - Pt will be able to successfully complete sexual intercourse without pain for improved ADL tolerance.   - Pt will report ability to tolerate speculum exam without pain for improved access to healthcare.      Plan     Plan of care Certification: 5/23/2024 to 8/23/2024.    Outpatient Physical Therapy 1-2 times weekly for 12 weeks to include the following interventions: therapeutic exercises, therapeutic activity, neuromuscular re-education, manual therapy, modalities PRN, patient/family education, dry needling, and self  care/home management    Manjula Sesay, PT, DPT, WCS

## 2024-05-27 NOTE — PROGRESS NOTES
Pelvic Health Physical Therapy   Treatment Note     Name: Inez Escobar  Clinic Number: [id number]    Therapy Diagnosis:   Encounter Diagnosis   Name Primary?    High-tone pelvic floor dysfunction Yes     Physician: Karie Storey MD    Visit Date: 5/27/2024    Physician Orders: PT Eval and Treat   Medical Diagnosis from Referral: Dyspareunia in female [N94.10], Pain with bowel movements [R19.8], History of syncope [Z87.898]   Evaluation Date: 5/23/2024  Authorization Period Expiration: 5/16/2025  Plan of Care Expiration: 8/23/2024  Visit # / Visits authorized: 1/ 12 current auth; 2 total      FOTO 0 /3         Time In: 1:45  Time Out: 2:30  Total Appointment Time (timed & untimed codes): 45 minutes     Precautions: universal    Subjective     Pt reports: felt fine after last session. Interested in getting pelvic wand..  She was compliant with home exercise program.  Response to previous treatment: tolerated well   Functional change: ongoing     Pain: 0/10  Location:  pelvis      Objective   Informed verbal consent provided 5/27/2024 prior to intravaginal treatment.   Chaperone: declined    Inez received the following manual therapy techniques: to develop extensibility and desensitization for 30 minutes including: trigger point/myofascial release of pelvic floor muscles     Inez participated in neuromuscular re-education activities to develop Coordination, Control, and Down training for 15 minutes including: diaphragmatic breathing + stretches for down-training; adding yoga to home exercise program; pelvic wand rationale       Home Exercises Provided and Patient Education Provided     Education provided:   - anatomy/physiology of pelvic floor, pelvic wand use, diaphragmatic breathing, and behavior modifications  Discussed progression of plan of care with patient; educated pt in activity modification; reviewed HEP with pt. Pt demonstrated and verbalized understanding of all instruction and was  provided with a handout of HEP (see Patient Instructions).      Written Home Exercises Provided: yes.  Exercises were reviewed and Inez was able to demonstrate them prior to the end of the session.  Inez demonstrated good  understanding of the education provided.     See EMR under Patient Instructions for exercises provided 5/27/2024.    Assessment     Session focused on manual intervention to address pelvic floor tension perpetuating symptoms. Established home exercise program for patient to work on while she is out of town for 2 weeks and attending physical therapy intermittently this summer. Also discussed patient getting pelvic wand.   Inez Is progressing well towards her goals.   Pt prognosis is Good.     Pt will continue to benefit from skilled outpatient physical therapy to address the deficits listed in the problem list box on initial evaluation, provide pt/family education and to maximize pt's level of independence in the home and community environment.     Pt's spiritual, cultural and educational needs considered and pt agreeable to plan of care and goals.     Anticipated barriers to physical therapy: school schedule     Goals:  Short Term Goals: 6 weeks   - Pt will demonstrate excellent knowledge and adherence to HEP to facilitate optimal recovery.  - Pt will demonstrate proper PFM contraction, relaxation, and lengthening coordinated with TA and breath for improved muscle coordination needed for functional activity.     Long Term Goals: 12 weeks   - Pt will demonstrate excellent knowledge and adherence to HEP for continued self-maintenance of symptoms.  - Pt will report FOTO score of 10% improvement or more indicating clinically relevant increase in function.  - Pt will demonstrate PFM strength of at least 3/5 MMT for improved strength needed to maintain continence.   - Pt will report bearing down appropriately 100% of the time for improved bowel function and decreased stress on adjacent  pelvic structures.   - Pt will demonstrate independence with pressure-management strategies to decreased stress on adjacent pelvic structures.   - Pt will be able to successfully complete sexual intercourse without pain for improved ADL tolerance.   - Pt will report ability to tolerate speculum exam without pain for improved access to healthcare.        Plan      Plan of care Certification: 5/23/2024 to 8/23/2024.    Cont per plan of care     Manjula Sesay, PT

## 2024-05-27 NOTE — PATIENT INSTRUCTIONS
"Yoga with Deedee on YouTube - aim for a few times per week       ___________________________________    Home Exercise Program: 05/27/2024    DIAPHRAGMATIC BREATHING     The diaphragm is a dome shaped muscle that forms the floor of the rib cage. It is the most efficient muscle for breathing and relaxation, although most people are not used to using the diaphragm. Diaphragmatic or belly breathing is an important technique to learn because it helps settle down or relax the autonomic nervous system. The correct use of diaphragmatic breathing can help to quiet brain activity resulting in the relaxation of all the muscles and organs of the body. This is accomplished by slow rhythmic breathing concentrated in the diaphragm muscle rather than the chest.    How to do proper relaxation breathing:    Relax your jaw by placing your tongue on the floor of your mouth and keeping your teeth slightly apart.   Take a deep breath in, letting the LOWER RIBS EXPAND OUTWARD while keeping shoulders and chest relax. Your abdomen will also expand but we want to really focus on getting rib expansion versus focusing solely on belly breathing. You can place your hands on the sides of your lower ribs and give a little squeeze to help if you're having trouble.     Exhale should be relaxed and passive.   It doesn't matter if you breathe in/out through your nose and/or mouth. Do whichever feels comfortable.  Remember to breathe slowly.  Do not force your breathing. Do not hold your breath.  Repeat daily while doing stretches listed below:         "single knee to chest" - lay on your back, use your hands to pull your left knee to your chest, keeping the right leg straight on the bed. Hold this pose while you incorporate your diaphragmatic breathing. Repeat on the opposite side. Complete 10-20 breaths on both legs.     __________________________________________________________      "Happy Baby" - lay on your back. Open your knees slightly wider " "than your torso, then bring them up toward your armpits. Position each ankle directly over the knee, so your shins are perpendicular to the floor. Flex through the heels. Hold this pose while you incorporate your deep breathing. Complete 10-20 breaths.        "Modified Happy Baby" OPTIONAL VARIATION - lay on your back, use your hands to pull your knees to your chest, then bring them out wide (about even with your shoulders). Hold this pose while you incorporate your deep breathing. Complete 10-20 breaths.    __________________________________________________________         "Child's Pose" - first start by getting onto your hands and knees, then move your feet so they are touching and your knees are wider than your hips. Sit back so that you are sitting on your heels and reach your arms forward. Think about resting in this position. Complete 10-20 breaths.     CHILD'S POSE VARIATION:                     "

## 2024-05-28 PROBLEM — M62.89 HIGH-TONE PELVIC FLOOR DYSFUNCTION: Status: ACTIVE | Noted: 2024-05-28

## 2024-06-03 ENCOUNTER — OFFICE VISIT (OUTPATIENT)
Dept: PSYCHIATRY | Facility: CLINIC | Age: 24
End: 2024-06-03
Payer: COMMERCIAL

## 2024-06-03 VITALS
DIASTOLIC BLOOD PRESSURE: 64 MMHG | SYSTOLIC BLOOD PRESSURE: 104 MMHG | BODY MASS INDEX: 20.68 KG/M2 | HEART RATE: 83 BPM | WEIGHT: 120.5 LBS

## 2024-06-03 DIAGNOSIS — F40.10 SOCIAL ANXIETY DISORDER: ICD-10-CM

## 2024-06-03 DIAGNOSIS — F41.1 GENERALIZED ANXIETY DISORDER: Primary | ICD-10-CM

## 2024-06-03 PROCEDURE — 99214 OFFICE O/P EST MOD 30 MIN: CPT | Mod: S$GLB,,, | Performed by: STUDENT IN AN ORGANIZED HEALTH CARE EDUCATION/TRAINING PROGRAM

## 2024-06-03 PROCEDURE — 99999 PR PBB SHADOW E&M-EST. PATIENT-LVL II: CPT | Mod: PBBFAC,,, | Performed by: STUDENT IN AN ORGANIZED HEALTH CARE EDUCATION/TRAINING PROGRAM

## 2024-06-03 PROCEDURE — 3078F DIAST BP <80 MM HG: CPT | Mod: CPTII,S$GLB,, | Performed by: STUDENT IN AN ORGANIZED HEALTH CARE EDUCATION/TRAINING PROGRAM

## 2024-06-03 PROCEDURE — 3074F SYST BP LT 130 MM HG: CPT | Mod: CPTII,S$GLB,, | Performed by: STUDENT IN AN ORGANIZED HEALTH CARE EDUCATION/TRAINING PROGRAM

## 2024-06-03 PROCEDURE — 3008F BODY MASS INDEX DOCD: CPT | Mod: CPTII,S$GLB,, | Performed by: STUDENT IN AN ORGANIZED HEALTH CARE EDUCATION/TRAINING PROGRAM

## 2024-06-03 RX ORDER — FLUOXETINE HYDROCHLORIDE 20 MG/1
20 CAPSULE ORAL DAILY
Qty: 30 CAPSULE | Refills: 5 | Status: SHIPPED | OUTPATIENT
Start: 2024-06-03 | End: 2024-11-30
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
## 2024-06-07 NOTE — PROGRESS NOTES
I have reviewed the notes, assessments, and/or procedures performed by [facility] Resident Denisse Christianson MD , I concur with her/his documentation of Inez Escobar.  Date of Service: 6/3/2024

## 2024-06-17 ENCOUNTER — CLINICAL SUPPORT (OUTPATIENT)
Dept: REHABILITATION | Facility: HOSPITAL | Age: 24
End: 2024-06-17
Payer: COMMERCIAL

## 2024-06-17 DIAGNOSIS — M62.89 HIGH-TONE PELVIC FLOOR DYSFUNCTION: Primary | ICD-10-CM

## 2024-06-17 PROCEDURE — 97140 MANUAL THERAPY 1/> REGIONS: CPT | Mod: PO

## 2024-06-17 NOTE — PROGRESS NOTES
Pelvic Health Physical Therapy   Treatment Note     Name: Inez Escobar  Clinic Number: [id number]    Therapy Diagnosis:   Encounter Diagnosis   Name Primary?    High-tone pelvic floor dysfunction Yes     Physician: Karie Storey MD    Visit Date: 6/17/2024    Physician Orders: PT Eval and Treat   Medical Diagnosis from Referral: Dyspareunia in female [N94.10], Pain with bowel movements [R19.8], History of syncope [Z87.898]   Evaluation Date: 5/23/2024  Authorization Period Expiration: 5/16/2025  Plan of Care Expiration: 8/23/2024  Visit # / Visits authorized: 1/ 12 current auth; 2 total      FOTO 0 /3         Time In: 1:45  Time Out: 2:30  Total Appointment Time (timed & untimed codes): 45 minutes     Precautions: universal    Subjective     Pt reports: has been doing her exercises and yoga regularly. She got a wand - tried to use it but couldn't find any tender points so she wasn't sure where to treat.   She hasn't had any painful bowel movements. She's still somewhat constipated though - going daily but it's still not much.   Sex is still painful when they get started and she always gets nervous that her pain is due to a yeast infection, but she's using the same lubricant that she's been told to use (water-based) sometimes the burning goes away and other times it doesn't.   The first time they had sex when she got back to school she had more achey pain vs burning pain.     She was compliant with home exercise program.  Response to previous treatment: tolerated well   Functional change: ongoing     Pain: 0/10  Location:  pelvis      Objective   Informed verbal consent provided 6/17/2024 prior to intravaginal treatment.   Chaperone: declined    Inez received the following manual therapy techniques: to develop extensibility and desensitization for 30 minutes including: trigger point/myofascial release of pelvic floor muscles     Inez participated in neuromuscular re-education activities to  develop Coordination, Control, and Down training for 15 minutes including: diaphragmatic breathing + stretches for down-training; adding yoga to home exercise program; pelvic wand rationale       Home Exercises Provided and Patient Education Provided     Education provided:   - anatomy/physiology of pelvic floor, pelvic wand use, diaphragmatic breathing, and behavior modifications  Discussed progression of plan of care with patient; educated pt in activity modification; reviewed HEP with pt. Pt demonstrated and verbalized understanding of all instruction and was provided with a handout of HEP (see Patient Instructions).      Written Home Exercises Provided: yes.  Exercises were reviewed and Inez was able to demonstrate them prior to the end of the session.  Inez demonstrated good  understanding of the education provided.     See EMR under Patient Instructions for exercises provided 5/27/2024.    Assessment     Session focused on manual intervention to address pelvic floor tension perpetuating symptoms, as well as instruction on proper use of therawand since patient is only able to attend physical therapy intermittently this summer.   Inez Is progressing well towards her goals.   Pt prognosis is Good.     Pt will continue to benefit from skilled outpatient physical therapy to address the deficits listed in the problem list box on initial evaluation, provide pt/family education and to maximize pt's level of independence in the home and community environment.     Pt's spiritual, cultural and educational needs considered and pt agreeable to plan of care and goals.     Anticipated barriers to physical therapy: school schedule     Goals:  All progressing   Short Term Goals: 6 weeks   - Pt will demonstrate excellent knowledge and adherence to HEP to facilitate optimal recovery.  - Pt will demonstrate proper PFM contraction, relaxation, and lengthening coordinated with TA and breath for improved muscle  coordination needed for functional activity.     Long Term Goals: 12 weeks   - Pt will demonstrate excellent knowledge and adherence to HEP for continued self-maintenance of symptoms.  - Pt will report FOTO score of 10% improvement or more indicating clinically relevant increase in function.  - Pt will demonstrate PFM strength of at least 3/5 MMT for improved strength needed to maintain continence.   - Pt will report bearing down appropriately 100% of the time for improved bowel function and decreased stress on adjacent pelvic structures.   - Pt will demonstrate independence with pressure-management strategies to decreased stress on adjacent pelvic structures.   - Pt will be able to successfully complete sexual intercourse without pain for improved ADL tolerance.   - Pt will report ability to tolerate speculum exam without pain for improved access to healthcare.        Plan      Plan of care Certification: 5/23/2024 to 8/23/2024.    Follow up with how patient is doing with therawand; continue addressing constipation and dyspareunia     Majnula Sesay, PT

## 2024-06-17 NOTE — PATIENT INSTRUCTIONS
______________________________________    TRIGGER POINT RELEASE WITH DILATOR / THERAWAND  1. Make sure the wand is clean, free of any visible soiling.  2. Lay back comfortably with your back well supported by several pillows and both knees bent.   3. Apply water-based lubricant (ie. Slippery stuff) on the end of the wand AND vaginal opening.  4. Grab the end of the wand (with the bumps on the end) with one hand, and spread your labia with the other hand to visualize the entrance of the vagina; insert the wand.   5. Gently palpate your pelvic floor muscles with the wand for trigger points/muscle spasms.                To find the pelvic floor muscles:              - In the middle at 6 o'clock = Rectum              - Move a little to the right at 5 o'clock = Right levator ani muscle               - Move a little to the left at 7 o'clock = Left levator ani muscle               - A little more to the right at 3/4 o'clock = Right obturator internus muscle              - A little more to the left at 8/9 o'clock = Left obturator internus muscle              - If you feel sharp, stabbing pain = bone or pudendal nerve (wrong)     6. When you find a sensitive, painful spot (a knot/bump in your muscle), apply constant pressure TO YOUR TOLERANCE.   *Do not apply so much pressure that you cannot tolerate it, your muscles start tightening up, or it leaves you too painful to do again the following day.     7. You may start to feel a pulse, throbbing, or light burning sensations; keep your pressure constant!  8. Hold this until the muscle spasm has diminished and the pain has improved. It may take several minutes.  9. Focus on Diaphragmatic Breathing and DROPPING your pelvic floor muscle (releasing the tension).     10. Once finished, remove wand.  11. Wash with anti-bacterial soap and thoroughly rinse. Let air dry whenever possible. Store in a dry, clean location.   12. Perform for 5-15 minutes, as needed. Check for muscle  spasms every day.     *Don't apply so much pressure that it leaves you too sore to perform again the next day.     STOP if there is increased bleeding, an infection, or extreme pain.

## 2024-06-24 ENCOUNTER — CLINICAL SUPPORT (OUTPATIENT)
Dept: REHABILITATION | Facility: HOSPITAL | Age: 24
End: 2024-06-24
Payer: COMMERCIAL

## 2024-06-24 DIAGNOSIS — M62.89 HIGH-TONE PELVIC FLOOR DYSFUNCTION: Primary | ICD-10-CM

## 2024-06-24 PROCEDURE — 97112 NEUROMUSCULAR REEDUCATION: CPT | Mod: PO

## 2024-06-24 PROCEDURE — 97530 THERAPEUTIC ACTIVITIES: CPT | Mod: PO

## 2024-06-24 NOTE — PROGRESS NOTES
Pelvic Health Physical Therapy   Treatment Note     Name: Inez Escobar  Clinic Number: [id number]    Therapy Diagnosis:   Encounter Diagnosis   Name Primary?    High-tone pelvic floor dysfunction Yes     Physician: Karie Storey MD    Visit Date: 6/24/2024    Physician Orders: PT Eval and Treat   Medical Diagnosis from Referral: Dyspareunia in female [N94.10], Pain with bowel movements [R19.8], History of syncope [Z87.898]   Evaluation Date: 5/23/2024  Authorization Period Expiration: 5/16/2025  Plan of Care Expiration: 8/23/2024  Visit # / Visits authorized: 3/ 12 current auth; 4 total      FOTO 0 /3      Time In: 10:05 am  Time Out: 10:45 am   Total Appointment Time (timed & untimed codes): 40 minutes     Precautions: universal    Subjective     Pt reports: she got her wisdom teeth out and did not use the wand due to this. Bowel movements have been a lot better since stopping the pain medications for the wisdom teeth removal. No pain after the last visit. She feels confident with using the therawand independently. Feels like the manual techniques are most helpful.    She was compliant with home exercise program.  Response to previous treatment: tolerated well   Functional change: ongoing     Pain: 0/10  Location:  pelvis      Objective   Informed verbal consent provided 6/24/2024 prior to intravaginal treatment.   Chaperone: declined    Inez received the following manual therapy techniques: to develop extensibility and desensitization for 15 minutes including:    [x] Trigger point/myofascial release of pelvic floor muscles   [x] Desensitization techniques for improved pain with transient movement    Inez participated in neuromuscular re-education activities to develop Coordination, Control, and Down training for 25 minutes including:    [x]pelvic floor relaxation/bulging training  [x] Pelvic floor relaxation speed after performing a Kegel  [x] Bearing down with abdominal release  [x]  "Diaphragmatic breathing in conjunction with pain science "alarm" analogy  [x] Pelvic floor mm contractions focus on activation at 3 layers sequentially in isolation and then sequentially       Home Exercises Provided and Patient Education Provided     Education provided:   - anatomy/physiology of pelvic floor, pelvic wand use, diaphragmatic breathing, and behavior modifications  Discussed progression of plan of care with patient; educated pt in activity modification; reviewed HEP with pt. Pt demonstrated and verbalized understanding of all instruction and was provided with a handout of HEP (see Patient Instructions).      Written Home Exercises Provided: yes.  Exercises were reviewed and Inez was able to demonstrate them prior to the end of the session.  Inez demonstrated good  understanding of the education provided.     See EMR under Patient Instructions for exercises provided 5/27/2024.    Assessment     Session focused on improved proprioception of pelvic floor and coordination of pelvic floor relaxation. Improved layer three relaxation with verbal cues to "breath into her pelvic floor or butt" for a visual cue. Introduced layer activation/relaxation for improved awareness and frequency of pelvic floor relaxations. Greatest difficulty with layer three squeeze and drop. Great coordination of layers 1 & 2 squeeze and drop. By the end of therapy, patient reports no pain with manual overpressure intra-vaginally or with transient movement. Encouraged patient to utilize these new techniques with the thera wand at home.  It should be noted that pt initially contracted the pelvic floor with cues to bear down, but this significantly improved following verbal and tactile cues, although further distal excursion would be beneficial for more effective defecation.       Inez Is progressing well towards her goals.   Pt prognosis is Good.     Pt will continue to benefit from skilled outpatient physical therapy to " address the deficits listed in the problem list box on initial evaluation, provide pt/family education and to maximize pt's level of independence in the home and community environment.     Pt's spiritual, cultural and educational needs considered and pt agreeable to plan of care and goals.     Anticipated barriers to physical therapy: school schedule     Goals:  All progressing   Short Term Goals: 6 weeks   - Pt will demonstrate excellent knowledge and adherence to HEP to facilitate optimal recovery.  - Pt will demonstrate proper PFM contraction, relaxation, and lengthening coordinated with TA and breath for improved muscle coordination needed for functional activity.     Long Term Goals: 12 weeks   - Pt will demonstrate excellent knowledge and adherence to HEP for continued self-maintenance of symptoms.  - Pt will report FOTO score of 10% improvement or more indicating clinically relevant increase in function.  - Pt will demonstrate PFM strength of at least 3/5 MMT for improved strength needed to maintain continence.   - Pt will report bearing down appropriately 100% of the time for improved bowel function and decreased stress on adjacent pelvic structures.   - Pt will demonstrate independence with pressure-management strategies to decreased stress on adjacent pelvic structures.   - Pt will be able to successfully complete sexual intercourse without pain for improved ADL tolerance.   - Pt will report ability to tolerate speculum exam without pain for improved access to healthcare.        Plan      Plan of care Certification: 5/23/2024 to 8/23/2024.    Follow up with how patient is doing with therawand; continue addressing constipation and dyspareunia. Progress layer relaxation     Linda Salcedo, PT

## 2024-06-24 NOTE — PATIENT INSTRUCTIONS
"  ([email or web address])     Relaxation      To isolate layer 1: think about opening around your vagina as you inhale or imagine sit bones widening      To isolate layer 2: imagine letting your urethra drop away from you as you inhale      To isolate layer 3: relax at your tailbone to let it expand down and backward as you inhale         So when practicing this at home: Complete 1 minute at each layer 1-2 times per day  Inhale and let pelvic floor muscles expand. Do not push muscles down!  Exhale and let pelvic floor muscles relax.       You can do layer 1,2,3 squeeze and follow it up with the relaxation as seen above. We are working on FINDING your muscles so that you are more able to easily relax them when needed.        Bowel Movement Mechanics  1. Sit on the toilet comfortably with legs and buttocks relaxed.  2. Put your feet on a waste basket or squatty potty  3. Lean forward while keeping your back straight, forearms rest on knees.  4. Keep your knees apart.  5. Fully relax pelvic floor muscles - think about softening, opening, dropping  6. Use gentle belly breaths and bulge lower abdominals like making a beer belly  7. Keep belly big on exhalation  8. Exhale like blowing out birthday candles  9. Keep breathing like this through entire bowel movement  10. Make sure to give enough time, ok for it to take several minutes     Do not strain and Do not hold your breath.       (Search "Squatty Potty" on [employer])           "

## 2024-07-15 ENCOUNTER — CLINICAL SUPPORT (OUTPATIENT)
Dept: REHABILITATION | Facility: HOSPITAL | Age: 24
End: 2024-07-15
Payer: COMMERCIAL

## 2024-07-15 DIAGNOSIS — M62.89 HIGH-TONE PELVIC FLOOR DYSFUNCTION: Primary | ICD-10-CM

## 2024-07-15 PROCEDURE — 97140 MANUAL THERAPY 1/> REGIONS: CPT | Mod: PO

## 2024-07-15 NOTE — PROGRESS NOTES
Pelvic Health Physical Therapy   Treatment Note     Name: Inez Escobar  Clinic Number: [id number]    Therapy Diagnosis:   Encounter Diagnosis   Name Primary?    High-tone pelvic floor dysfunction Yes     Physician: Karie Storey MD    Visit Date: 7/15/2024    Physician Orders: PT Eval and Treat   Medical Diagnosis from Referral: Dyspareunia in female [N94.10], Pain with bowel movements [R19.8], History of syncope [Z87.898]   Evaluation Date: 5/23/2024  Authorization Period Expiration: 5/16/2025  Plan of Care Expiration: 8/23/2024  Visit # / Visits authorized: 4/ 12 current auth; 5 total      FOTO 0 /3      Time In: 10:05 am  Time Out: 10:45 am   Total Appointment Time (timed & untimed codes): 40 minutes     Precautions: universal    Subjective     Pt reports: doing fairly well, hasn't had any painful bowel movements. Her GI system is better but she still feels like she's constipated despite not having pain; she forgets to take Miralax consistently, doesn't think about it until she goes to the bathroom, has [place] Stool Scale type 1 stool once/day.       She was compliant with home exercise program.  Response to previous treatment: tolerated well   Functional change: ongoing     Pain: 0/10  Location:  pelvis      Objective   Informed verbal consent provided 7/15/2024 prior to intravaginal treatment.   Chaperone: declined    Inez received the following manual therapy techniques: to develop extensibility and desensitization for 15 minutes including:    [x] Trigger point/myofascial release of pelvic floor muscles   [x] Desensitization techniques for improved pain with transient movement    Inez participated in neuromuscular re-education activities to develop Coordination, Control, and Down training for 25 minutes including:    [x]pelvic floor relaxation/bulging training  [x] Pelvic floor relaxation speed after performing a Kegel  [x] Bearing down with abdominal release  [x] Diaphragmatic breathing  "in conjunction with pain science "alarm" analogy  [x] Pelvic floor mm contractions focus on activation at 3 layers sequentially in isolation and then sequentially       Home Exercises Provided and Patient Education Provided     Education provided:   - anatomy/physiology of pelvic floor, pelvic wand use, diaphragmatic breathing, and behavior modifications  Discussed progression of plan of care with patient; educated pt in activity modification; reviewed HEP with pt. Pt demonstrated and verbalized understanding of all instruction and was provided with a handout of HEP (see Patient Instructions).      Written Home Exercises Provided: yes.  Exercises were reviewed and Inez was able to demonstrate them prior to the end of the session.  Inez demonstrated good  understanding of the education provided.     See EMR under Patient Instructions for exercises provided 5/27/2024.    Assessment     Continued manual intervention for layer 3 pelvic floor muscles - patient had minimal discomfort (left>right) despite significant increased tone. Verbal cues required for proper bearing down but able to improve with verbal cues and tactile cues. Encouraged patient to take Miralax daily; instructed on colon massage to aid in bowel emptying. Patient to continue pelvic wand, bring into therapy if she feels she needs further instruction.       Inez Is progressing well towards her goals.   Pt prognosis is Good.     Pt will continue to benefit from skilled outpatient physical therapy to address the deficits listed in the problem list box on initial evaluation, provide pt/family education and to maximize pt's level of independence in the home and community environment.     Pt's spiritual, cultural and educational needs considered and pt agreeable to plan of care and goals.     Anticipated barriers to physical therapy: school schedule     Goals:  All progressing   Short Term Goals: 6 weeks   - Pt will demonstrate excellent knowledge " and adherence to HEP to facilitate optimal recovery.  - Pt will demonstrate proper PFM contraction, relaxation, and lengthening coordinated with TA and breath for improved muscle coordination needed for functional activity.     Long Term Goals: 12 weeks   - Pt will demonstrate excellent knowledge and adherence to HEP for continued self-maintenance of symptoms.  - Pt will report FOTO score of 10% improvement or more indicating clinically relevant increase in function.  - Pt will demonstrate PFM strength of at least 3/5 MMT for improved strength needed to maintain continence.   - Pt will report bearing down appropriately 100% of the time for improved bowel function and decreased stress on adjacent pelvic structures.   - Pt will demonstrate independence with pressure-management strategies to decreased stress on adjacent pelvic structures.   - Pt will be able to successfully complete sexual intercourse without pain for improved ADL tolerance.   - Pt will report ability to tolerate speculum exam without pain for improved access to healthcare.        Plan      Plan of care Certification: 5/23/2024 to 8/23/2024.    Follow up with how patient is doing with therawand; continue addressing constipation and dyspareunia. Progress layer relaxation     Manjula Sesay, PT

## 2024-07-15 NOTE — PATIENT INSTRUCTIONS
Colon Massage:   start at your right hip, massage up to your right ribcage, across to your left ribcage, and down to your left hip, using circular motions. Repeat 3 times.     Attempt to have a bowel movement after performing massage.   Perform as needed after each meal or snack

## 2024-07-17 ENCOUNTER — CLINICAL SUPPORT (OUTPATIENT)
Dept: REHABILITATION | Facility: HOSPITAL | Age: 24
End: 2024-07-17
Payer: COMMERCIAL

## 2024-07-17 DIAGNOSIS — M62.89 HIGH-TONE PELVIC FLOOR DYSFUNCTION: Primary | ICD-10-CM

## 2024-07-17 PROCEDURE — 97140 MANUAL THERAPY 1/> REGIONS: CPT | Mod: PO

## 2024-07-17 NOTE — PROGRESS NOTES
Pelvic Health Physical Therapy   Treatment Note     Name: Inez Escobar  Clinic Number: [id number]    Therapy Diagnosis:   Encounter Diagnosis   Name Primary?    High-tone pelvic floor dysfunction Yes     Physician: Karie Storey MD    Visit Date: 7/17/2024    Physician Orders: PT Eval and Treat   Medical Diagnosis from Referral: Dyspareunia in female [N94.10], Pain with bowel movements [R19.8], History of syncope [Z87.898]   Evaluation Date: 5/23/2024  Authorization Period Expiration: 5/16/2025  Plan of Care Expiration: 8/23/2024  Visit # / Visits authorized: 4/ 12 current auth; 5 total      FOTO 0 /3      Time In: 10:05 am  Time Out: 10:45 am   Total Appointment Time (timed & untimed codes): 40 minutes     Precautions: universal    Subjective     Pt reports: doing well, thinks that constipation right now is related to being on vacation and not eating much fiber.   Still having type 1 stool/day but it's getting a little better with eating more fiber the last couple days.       She was compliant with home exercise program.  Response to previous treatment: tolerated well   Functional change: ongoing     Pain: 0/10  Location:  pelvis      Objective   Informed verbal consent provided 7/17/2024 prior to intravaginal treatment.   Chaperone: josemanuel Wiley received the following manual therapy techniques: to develop extensibility and desensitization for 40 minutes including:    [x] Trigger point/myofascial release of pelvic floor muscles   [x] Desensitization techniques for improved pain with transient movement  Functional dry needling for left glute med/min     Discussed the purpose, mechanism, and indications of dry needling with pt. Pt was cleared of all precautions and contraindications, and pt signed consent form for dry needling performance today by a qualified dry needling PT. Instructed pt to avoid ice and NSAIDS for next 4-6 hours to allow positive effects to take place from needling. Also  "encouraged pt to drink extra water today to dilute metabolic bi-product accumulation. Pt verbalized understanding to all instruction.         Inez participated in neuromuscular re-education activities to develop Coordination, Control, and Down training for 00 minutes including:    [x]pelvic floor relaxation/bulging training  [x] Pelvic floor relaxation speed after performing a Kegel  [x] Bearing down with abdominal release  [x] Diaphragmatic breathing in conjunction with pain science "alarm" analogy  [x] Pelvic floor mm contractions focus on activation at 3 layers sequentially in isolation and then sequentially       Home Exercises Provided and Patient Education Provided     Education provided:   - anatomy/physiology of pelvic floor, pelvic wand use, diaphragmatic breathing, and behavior modifications  Discussed progression of plan of care with patient; educated pt in activity modification; reviewed HEP with pt. Pt demonstrated and verbalized understanding of all instruction and was provided with a handout of HEP (see Patient Instructions).      Written Home Exercises Provided: yes.  Exercises were reviewed and Inez was able to demonstrate them prior to the end of the session.  Inez demonstrated good  understanding of the education provided.     See EMR under Patient Instructions for exercises provided 5/27/2024.    Assessment     Continued manual intervention for layer 3 pelvic floor muscles - severe restriction of left obturator internus correlating with increased left hip pain - addressed via internal manual intervention as well as functional dry needling. Encouraged patient to take Miralax daily; instructed on colon massage to aid in bowel emptying. Patient to continue pelvic wand, bring into therapy if she feels she needs further instruction.       Inez Is progressing well towards her goals.   Pt prognosis is Good.     Pt will continue to benefit from skilled outpatient physical therapy to " address the deficits listed in the problem list box on initial evaluation, provide pt/family education and to maximize pt's level of independence in the home and community environment.     Pt's spiritual, cultural and educational needs considered and pt agreeable to plan of care and goals.     Anticipated barriers to physical therapy: school schedule     Goals:  All progressing   Short Term Goals: 6 weeks   - Pt will demonstrate excellent knowledge and adherence to HEP to facilitate optimal recovery.  - Pt will demonstrate proper PFM contraction, relaxation, and lengthening coordinated with TA and breath for improved muscle coordination needed for functional activity.     Long Term Goals: 12 weeks   - Pt will demonstrate excellent knowledge and adherence to HEP for continued self-maintenance of symptoms.  - Pt will report FOTO score of 10% improvement or more indicating clinically relevant increase in function.  - Pt will demonstrate PFM strength of at least 3/5 MMT for improved strength needed to maintain continence.   - Pt will report bearing down appropriately 100% of the time for improved bowel function and decreased stress on adjacent pelvic structures.   - Pt will demonstrate independence with pressure-management strategies to decreased stress on adjacent pelvic structures.   - Pt will be able to successfully complete sexual intercourse without pain for improved ADL tolerance.   - Pt will report ability to tolerate speculum exam without pain for improved access to healthcare.        Plan      Plan of care Certification: 5/23/2024 to 8/23/2024.    Follow up with how patient is doing with therawand; continue addressing constipation and dyspareunia. Progress layer relaxation     Manjula Sesay, PT

## 2024-07-22 ENCOUNTER — CLINICAL SUPPORT (OUTPATIENT)
Dept: REHABILITATION | Facility: HOSPITAL | Age: 24
End: 2024-07-22
Payer: COMMERCIAL

## 2024-07-22 DIAGNOSIS — M62.89 HIGH-TONE PELVIC FLOOR DYSFUNCTION: Primary | ICD-10-CM

## 2024-07-22 PROCEDURE — 97140 MANUAL THERAPY 1/> REGIONS: CPT | Mod: PO

## 2024-07-22 NOTE — PROGRESS NOTES
Pelvic Health Physical Therapy   Treatment Note     Name: Inez Escobar  Clinic Number: [id number]    Therapy Diagnosis:   Encounter Diagnosis   Name Primary?    High-tone pelvic floor dysfunction Yes     Physician: Karie Storey MD    Visit Date: 7/22/2024    Physician Orders: PT Eval and Treat   Medical Diagnosis from Referral: Dyspareunia in female [N94.10], Pain with bowel movements [R19.8], History of syncope [Z87.898]   Evaluation Date: 5/23/2024  Authorization Period Expiration: 5/16/2025  Plan of Care Expiration: 8/23/2024  Visit # / Visits authorized: 4/ 12 current auth; 5 total      FOTO 0 /3      Time In: 11:30 am  Time Out: 12:15  Total Appointment Time (timed & untimed codes): 45 minutes     Precautions: universal    Subjective     Pt reports: had sex this weekend, was able to tell that her pain was more left-sided. She doesn't think this is a change, but rather just something she's noticed after discussing the tension she had on her left side last session.   Hip feels better after functional dry needling last session.   Going to the bathroom (bowel movements) more often but still not passing a lot of stool.     Going back to [place] next week. Would be able to follow up with a PFPT in [place], wants to stay within [facility] system.       She was compliant with home exercise program.  Response to previous treatment: tolerated well   Functional change: ongoing     Pain: 0/10  Location:  pelvis      Objective   Informed verbal consent provided 7/22/2024 prior to intravaginal treatment.   Chaperone: josemanuel Wiley received the following manual therapy techniques: to develop extensibility and desensitization for 40 minutes including:    [x] Trigger point/myofascial release of pelvic floor muscles   [x] Desensitization techniques for improved pain with transient movement  Soft tissue mobilization for abdominal wall, specifically along colon.           Home Exercises Provided and  Patient Education Provided     Education provided:   - anatomy/physiology of pelvic floor, pelvic wand use, diaphragmatic breathing, and behavior modifications  Discussed progression of plan of care with patient; educated pt in activity modification; reviewed HEP with pt. Pt demonstrated and verbalized understanding of all instruction and was provided with a handout of HEP (see Patient Instructions).      Written Home Exercises Provided: yes.  Exercises were reviewed and Inez was able to demonstrate them prior to the end of the session.  Inez demonstrated good  understanding of the education provided.     See EMR under Patient Instructions for exercises provided 5/27/2024.    Assessment     Continued manual intervention for layer 3 pelvic floor muscles - severe restriction of left obturator internus.  Encouraged patient to take Miralax daily for time being to help increase volume of stool passed with bowel movements, hoping this will help decreased left-sided pain  Reached out to PFPT with [facility] in [place] to transition care when patient returns to school per patient request.       Inez Is progressing well towards her goals.   Pt prognosis is Good.     Pt will continue to benefit from skilled outpatient physical therapy to address the deficits listed in the problem list box on initial evaluation, provide pt/family education and to maximize pt's level of independence in the home and community environment.     Pt's spiritual, cultural and educational needs considered and pt agreeable to plan of care and goals.     Anticipated barriers to physical therapy: school schedule     Goals:  All progressing   Short Term Goals: 6 weeks   - Pt will demonstrate excellent knowledge and adherence to HEP to facilitate optimal recovery.  - Pt will demonstrate proper PFM contraction, relaxation, and lengthening coordinated with TA and breath for improved muscle coordination needed for functional activity.     Long Term  Goals: 12 weeks   - Pt will demonstrate excellent knowledge and adherence to HEP for continued self-maintenance of symptoms.  - Pt will report FOTO score of 10% improvement or more indicating clinically relevant increase in function.  - Pt will demonstrate PFM strength of at least 3/5 MMT for improved strength needed to maintain continence.   - Pt will report bearing down appropriately 100% of the time for improved bowel function and decreased stress on adjacent pelvic structures.   - Pt will demonstrate independence with pressure-management strategies to decreased stress on adjacent pelvic structures.   - Pt will be able to successfully complete sexual intercourse without pain for improved ADL tolerance.   - Pt will report ability to tolerate speculum exam without pain for improved access to healthcare.        Plan      Plan of care Certification: 5/23/2024 to 8/23/2024.    Follow up with how patient is doing with therawand; continue addressing constipation and dyspareunia. Progress layer relaxation     Manjula Sesay, PT

## 2024-07-24 ENCOUNTER — CLINICAL SUPPORT (OUTPATIENT)
Dept: REHABILITATION | Facility: HOSPITAL | Age: 24
End: 2024-07-24
Payer: COMMERCIAL

## 2024-07-24 DIAGNOSIS — M62.89 HIGH-TONE PELVIC FLOOR DYSFUNCTION: Primary | ICD-10-CM

## 2024-07-24 PROCEDURE — 97140 MANUAL THERAPY 1/> REGIONS: CPT | Mod: PO

## 2024-07-24 PROCEDURE — 97530 THERAPEUTIC ACTIVITIES: CPT | Mod: PO

## 2024-07-24 PROCEDURE — 97112 NEUROMUSCULAR REEDUCATION: CPT | Mod: PO

## 2024-07-24 NOTE — PROGRESS NOTES
"  Pelvic Health Physical Therapy   Treatment Note     Name: Inez Escobar  Clinic Number: [id number]    Therapy Diagnosis:   Encounter Diagnosis   Name Primary?    High-tone pelvic floor dysfunction Yes       Physician: Karie Storey MD    Visit Date: 7/24/2024    Physician Orders: PT Eval and Treat   Medical Diagnosis from Referral: Dyspareunia in female [N94.10], Pain with bowel movements [R19.8], History of syncope [Z87.898]   Evaluation Date: 5/23/2024  Authorization Period Expiration: 12/31/2024  Plan of Care Expiration: 8/23/2024  Visit # / Visits authorized: 5/ 12 current auth; 6 total       Time In: 11:37 am  Time Out: 12:25 pm  Total Appointment Time (timed & untimed codes): 48 minutes     Precautions: universal    Subjective     Pt reports: her hip felt better after needling but her left pelvic floor still feels very tense. She definitely notices the left side hurts the most with intercourse. The outside hip feels better but not the pelvic floor. She used to be a runner and grew up dancing. Her left hip has always been more tight and she knows she has mild scoliosis.     "Going back to [place] next week. Would be able to follow up with a PFPT in [place], wants to stay within [facility] system. "    She was compliant with home exercise program.  Response to previous treatment: tolerated well   Functional change: ongoing     Pain: 0/10  Location:  pelvis      Objective   Informed verbal consent provided 7/24/2024 prior to intravaginal treatment.   Chaperone: declined      In supine:  Left iliac crest elevated   Left anterior innominate rotation     Left side flunctionally weaker   -Weak posterior glute med on L (3+/5--> patient unable to maintaining external rotation of leg with increased resistance)      Inez received the following manual therapy techniques: to develop extensibility and desensitization for 16 minutes including:    [x] Long axis hip distraction grade  III on L 3 x " "30"   Long axis hip manipulation on L  [x] MET into left hip flexion, right hip extension followed by shotgun technique 3 x 5" each  [x] Myofascial release of pelvic floor muscles     Not today:  Soft tissue mobilization for abdominal wall, specifically along colon  Desensitization techniques for improved pain with transient movement      Inez participated in neuromuscular re-education activities to develop Coordination, Control, Down training, Proprioception, and Sense for 24 minutes including:   [x]pelvic floor relaxation/bulging training, pelvic floor relaxation speed after performing a kegel, and diaphragmatic breathing   [x] Clam 3 progressoin 2 x 15 each   [x] Clamshells, blue theraband 10 x 10"   [x] Reviewed Clamshell with row for HEP until she is able to get back in to PT    Inez participated in dynamic functional therapeutic activities to improve functional performance for 8  minutes, including:  [x] HEP update provided and reviewed   [x] Plan of care review  [x] Anatomy review and discussion of the anatomy on current level of function         Home Exercises Provided and Patient Education Provided     Education provided:   - anatomy/physiology of pelvic floor, pelvic wand use, diaphragmatic breathing, and behavior modifications  Discussed progression of plan of care with patient; educated pt in activity modification; reviewed HEP with pt. Pt demonstrated and verbalized understanding of all instruction and was provided with a handout of HEP (see Patient Instructions).      Written Home Exercises Provided: yes.  Exercises were reviewed and Inez was able to demonstrate them prior to the end of the session.  Inez demonstrated good  understanding of the education provided.     See EMR under Patient Instructions for exercises provided 5/27/2024.    Assessment     Today's assessment revealed posterior glute med weakness on left and innominate pathology as noted above. Once muscle energy techniques " provided followed by deep/superficial hip activation and strengthening bilaterally, internal assessment performed. Patient demonstrates significant reduction in left pelvic floor pain for the first time today, revealing that pelvic tension is likely a compensation for hip rotator weakness. Provided Inez with self adjustment and further hip stabilization challenges to continue performing prior to transitioning to another therapy clinic to continue receiving care as she returns to school. Encouraged patient to continue using pelvic wand as elevated muscle tone, although improving, is still present. Pt will continue to benefit from skilled outpatient physical therapy to address the deficits listed in the problem list box on initial evaluation, provide pt/family education and to maximize pt's level of independence in the home and community environment.       nIez Is progressing well towards her goals.   Pt prognosis is Good.     Pt will continue to benefit from skilled outpatient physical therapy to address the deficits listed in the problem list box on initial evaluation, provide pt/family education and to maximize pt's level of independence in the home and community environment.     Pt's spiritual, cultural and educational needs considered and pt agreeable to plan of care and goals.     Anticipated barriers to physical therapy: school schedule     Goals:  All progressing   Short Term Goals: 6 weeks   - Pt will demonstrate excellent knowledge and adherence to HEP to facilitate optimal recovery.  - Pt will demonstrate proper PFM contraction, relaxation, and lengthening coordinated with TA and breath for improved muscle coordination needed for functional activity.     Long Term Goals: 12 weeks   - Pt will demonstrate excellent knowledge and adherence to HEP for continued self-maintenance of symptoms.  - Pt will report FOTO score of 10% improvement or more indicating clinically relevant increase in function.  - Pt  will demonstrate PFM strength of at least 3/5 MMT for improved strength needed to maintain continence.   - Pt will report bearing down appropriately 100% of the time for improved bowel function and decreased stress on adjacent pelvic structures.   - Pt will demonstrate independence with pressure-management strategies to decreased stress on adjacent pelvic structures.   - Pt will be able to successfully complete sexual intercourse without pain for improved ADL tolerance.   - Pt will report ability to tolerate speculum exam without pain for improved access to healthcare.        Plan      Plan of care Certification: 5/23/2024 to 8/23/2024.    Continue current plan of care, progressing as tolerated    Next visit: continue addressing constipation and dyspareunia. Progress layer relaxation and hip stabilization as above    Linda Salcedo, PT

## 2024-08-24 NOTE — TELEPHONE ENCOUNTER
Refill Routing Note   Medication(s) are not appropriate for processing by [facility] for the following reason(s):        Outside of protocol    [facility] action(s):  Route               Appointments  past 12m or future 3m with PCP    Date Provider   Last Visit   5/9/2024 Karie Storey MD   Next Visit   Visit date not found Karie Storey MD   ED visits in past 90 days: 0        Note composed:7:26 AM 08/24/2024

## 2024-08-26 RX ORDER — FLUCONAZOLE 150 MG/1
TABLET ORAL
Qty: 2 TABLET | Refills: 0 | Status: SHIPPED | OUTPATIENT
Start: 2024-08-26

## 2024-09-05 ENCOUNTER — CLINICAL SUPPORT (OUTPATIENT)
Dept: REHABILITATION | Facility: OTHER | Age: 24
End: 2024-09-05
Payer: COMMERCIAL

## 2024-09-05 DIAGNOSIS — M62.89 HIGH-TONE PELVIC FLOOR DYSFUNCTION: Primary | ICD-10-CM

## 2024-09-05 PROCEDURE — 97112 NEUROMUSCULAR REEDUCATION: CPT | Mod: PN | Performed by: PHYSICAL THERAPIST

## 2024-09-05 PROCEDURE — 97530 THERAPEUTIC ACTIVITIES: CPT | Mod: PN | Performed by: PHYSICAL THERAPIST

## 2024-09-05 NOTE — PROGRESS NOTES
Pelvic Health Physical Therapy   Treatment Note     Name: Inez Escobar  Clinic Number: [id number]    Therapy Diagnosis:   Encounter Diagnosis   Name Primary?    High-tone pelvic floor dysfunction Yes     Physician: Karie Storey MD    Visit Date: 9/5/2024    Physician Orders: PT Eval and Treat   Medical Diagnosis from Referral: Dyspareunia in female [N94.10], Pain with bowel movements [R19.8], History of syncope [Z87.898]   Evaluation Date: 5/23/2024  Last Reassessment: 9/5/2024, visit #  Authorization Period Expiration: 12/31/2024  Plan of Care Expiration: 12/5/2024  Visit # / Visits authorized: 9/12      Time In: 11:03  Time Out: 12:20  Total Appointment Time (timed & untimed codes): 48 minutes     Precautions: universal    Subjective     Inez reports fluctuating improvements in dyspareunia since starting therapy, including improvement after treatment of SIJ/hip last visit plus updated HEP. She started back at school last month and has not been as consistent with her HEP. She just started EMDR therapy to manage anxiety.    She was somewhat compliant with home exercise program - not exercising as much as she was  Response to previous treatment: tolerated well   Functional change: ongoing     Pain: 0/10  Location:  pelvis      Objective     Informed verbal consent provided 9/5/2024 prior to intravaginal treatment.   Chaperone: declined    Therapeutic activities to improve functional performance for 40 minutes -  [x] Education on pelvic floor anatomy & function  [x] Assessment of back, abdominal wall, hips, and pelvic floor - see below  [x] Review of therawand use  [x] Education on nervous system upregulation on pelvic floor tension  [x] Education on dilator training  [x] Discussion of intercourse considerations for pelvic pain - lubricant, positioning, pillows, pelvic floor massage  [x] Instruction on techniques to reduce excess pelvic floor tension - body scanning, check-ins, diaphragmatic  "breathing  [x] HEP building/HEP review    Neuromuscular re-education activities to develop balance, coordination, kinesthetic sense, motor control, proprioception, and posture for 23 minutes -   [x] Diaphragmatic breathing - unable to demonstrate appropriate despite cues  [x] Pelvic floor muscle coordination training with sEMG feedback: downtraining and pelvic floor muscle squeezes - Pt presents with resting tone of 6-8 microvolts and appropriate PFM contraction on command. Pt was able to downtrain to 3-5 microvolts with PT cues for diaphragmatic breathing, relaxation, visualization, and body scanning. She struggled to consistently lower PFM tone and maintain, sometimes getting "lost" after squeezes/exercises and having trouble downtraining again. Focusing on pelvic drop/pushing out sensation was particularly helpful.     --------------    ORTHO SCREEN  Tenderness to palpation of bilateral glutes  Pain with central mobilization to L4 and L5    Hip Strength 9/5/2024   R hip flexion 4+/5   R hip external rotation 4-/5   L hip flexion 4/5   L hip external rotation 3+/5     Hip Range of Motion 9/5/2024   R internal rotation 10 degrees   L internal rotation 20 degrees     ABDOMINAL WALL ASSESSMENT  Palpation: tender and increased tension     BREATHING MECHANICS ASSESSMENT   Thorax Assessment During Quiet Respiration: Decreased excursion of abdominal wall   Thorax Assessment During Deep Respiration: Decreased excursion of abdominal wall     VAGINAL PELVIC FLOOR EXAM - external assessment  Introitus: WNL  Skin condition: WNL  Scarring: none   Sensation: WNL   Pain: tenderness to palpation of left bulbocavernosus and superficial transverse perineal  Voluntary contraction: visible lift  Voluntary relaxation: visible drop  Bearing down: bulge     VAGINAL PELVIC FLOOR EXAM - internal assessment  Pain: tenderness to palpation of bilateral layer 3  Sensation: able to localized pressure appropriately   Vaginal vault: stenotic "   Muscle Bulk: increased tone  Muscle Power: 3/5    Quality of contraction: incomplete relaxation   Specificity: WNL   Coordination: WNL   Prolapse: absent      Home Exercises Provided and Patient Education Provided     Education provided:   - anatomy/physiology of pelvic floor, pelvic wand use, diaphragmatic breathing, and behavior modifications  Discussed progression of plan of care with patient; educated pt in activity modification; reviewed HEP with pt. Pt demonstrated and verbalized understanding of all instruction and was provided with a handout of HEP (see Patient Instructions).      Written Home Exercises Provided: yes.  Exercises were reviewed and Inez was able to demonstrate them prior to the end of the session.  Inez demonstrated good understanding of the education provided.     See EMR under Patient Instructions for exercises provided on 9/5/24    Assessment     Pt tolerated treatment session well, with good understanding of education provided and no increased symptoms with exercise. Pt demonstrates posterior hip weakness and hip IR range of motion deficits that likely impact chronic pelvic floor tension, in addition to somatic anxiety. Assessment with sEMG reveals elevated resting tone of the pelvic floor, but pt was able to downtrain effectively using auditory and visual cues from sEMG. Pt may benefit from dilator training to reduce reactive muscle guarding with intercourse. Pt's functional mobility and ability to perform ADLs still limited by pelvic floor dysfunction. She requires skilled therapy for continued patient education and coordination retraining.      Inez is progressing well towards her goals.   Pt prognosis is Good.     Pt will continue to benefit from skilled outpatient physical therapy to address the deficits listed in the problem list box on initial evaluation, provide pt/family education and to maximize pt's level of independence in the home and community environment.      Pt's spiritual, cultural and educational needs considered and pt agreeable to plan of care and goals.     Anticipated barriers to physical therapy: school schedule       All progressing   Short Term Goals: 6 weeks   - Pt will demonstrate excellent knowledge and adherence to HEP to facilitate optimal recovery.  - Pt will demonstrate proper PFM contraction, relaxation, and lengthening coordinated with TA and breath for improved muscle coordination needed for functional activity.     Long Term Goals: 12 weeks   - Pt will demonstrate excellent knowledge and adherence to HEP for continued self-maintenance of symptoms.  - Pt will report FOTO score of 10% improvement or more indicating clinically relevant increase in function.  - Pt will demonstrate PFM strength of at least 3/5 MMT for improved strength needed to maintain continence.   - Pt will report bearing down appropriately 100% of the time for improved bowel function and decreased stress on adjacent pelvic structures.   - Pt will demonstrate independence with pressure-management strategies to decreased stress on adjacent pelvic structures.   - Pt will be able to successfully complete sexual intercourse without pain for improved ADL tolerance.   - Pt will report ability to tolerate speculum exam without pain for improved access to healthcare.        Plan      Plan of care Certification: 5/23/2024 to 8/23/2024.    Continue current plan of care, progressing as tolerated        Shiloh Cruz, PT

## 2024-09-05 NOTE — PLAN OF CARE
[facility]   Pelvic Health Physical Therapy Re-Evaluation     Date: 9/5/2024   Name: Inez Escobar  Clinic Number: [id number]    Therapy Diagnosis:   Encounter Diagnosis   Name Primary?    High-tone pelvic floor dysfunction Yes     Referring Provider: Karie Storey MD    Physician Orders: PT Eval and Treat   Medical Diagnosis from Referral: Dyspareunia in female [N94.10], Pain with bowel movements [R19.8], History of syncope [Z87.898]   Evaluation Date: 5/23/2024  Last Reassessment: 9/5/2024, visit #  Authorization Period Expiration: 12/31/2024  Plan of Care Expiration: 12/5/2024  Visit # / Visits authorized: 9/12      SUBJECTIVE     Inez reports fluctuating improvements in dyspareunia since starting therapy, including improvement after treatment of SIJ/hip last visit plus updated HEP. She started back at school last month and has not been as consistent with her HEP. She just started EMDR therapy to manage anxiety.    OBJECTIVE     ORTHO SCREEN  Tenderness to palpation of bilateral glutes  Pain with central mobilization to L4 and L5    Hip Strength 9/5/2024   R hip flexion 4+/5   R hip external rotation 4-/5   L hip flexion 4/5   L hip external rotation 3+/5     Hip Range of Motion 9/5/2024   R internal rotation 10 degrees   L internal rotation 20 degrees     ABDOMINAL WALL ASSESSMENT  Palpation: tender and increased tension     BREATHING MECHANICS ASSESSMENT   Thorax Assessment During Quiet Respiration: Decreased excursion of abdominal wall   Thorax Assessment During Deep Respiration: Decreased excursion of abdominal wall     VAGINAL PELVIC FLOOR EXAM - external assessment  Introitus: WNL  Skin condition: WNL  Scarring: none   Sensation: WNL   Pain: tenderness to palpation of left bulbocavernosus and superficial transverse perineal  Voluntary contraction: visible lift  Voluntary relaxation: visible drop  Bearing down: bulge     VAGINAL PELVIC FLOOR EXAM - internal  assessment  Pain: tenderness to palpation of bilateral layer 3  Sensation: able to localized pressure appropriately   Vaginal vault: stenotic   Muscle Bulk: increased tone  Muscle Power: 3/5    Quality of contraction: incomplete relaxation   Specificity: WNL   Coordination: WNL   Prolapse: absent    ASSESSMENT     Pt has progressed well with physical therapy, demonstrating fluctuating improvements in pain levels, pelvic floor tension, and activity tolerance. Pt demonstrates posterior hip weakness and hip IR range of motion deficits that likely impact chronic pelvic floor tension, in addition to somatic anxiety. Assessment with sEMG reveals elevated resting tone of the pelvic floor, but pt was able to downtrain effectively using auditory and visual cues from sEMG - additional training indicated. Pt may benefit from dilator training to reduce reactive muscle guarding with intercourse. Pt continues to experience pain/difficulty with ADLs and functional mobility due to lumbopelvic and pelvic floor dysfunction. Pt has met/partially met some goals from the initial evaluation, and the rest of those goals remain appropriate for the plan of care. Pt will benefit from continued skilled therapy to provide targeted manual therapy and progressive resistance exercise.    Inez is progressing well towards her goals.   Pt prognosis is Good.     Pt will continue to benefit from skilled outpatient physical therapy to address the deficits listed in the problem list box on initial evaluation, provide pt/family education and to maximize pt's level of independence in the home and community environment.     Pt's spiritual, cultural and educational needs considered and pt agreeable to plan of care and goals.     Anticipated barriers to physical therapy: school schedule       All progressing   Short Term Goals: 6 weeks   - Pt will demonstrate excellent knowledge and adherence to HEP to facilitate optimal recovery.  - Pt will demonstrate  proper PFM contraction, relaxation, and lengthening coordinated with TA and breath for improved muscle coordination needed for functional activity.     Long Term Goals: 12 weeks   - Pt will demonstrate excellent knowledge and adherence to HEP for continued self-maintenance of symptoms.  - Pt will report FOTO score of 10% improvement or more indicating clinically relevant increase in function.  - Pt will demonstrate PFM strength of at least 3/5 MMT for improved strength needed to maintain continence.   - Pt will report bearing down appropriately 100% of the time for improved bowel function and decreased stress on adjacent pelvic structures.   - Pt will demonstrate independence with pressure-management strategies to decreased stress on adjacent pelvic structures.   - Pt will be able to successfully complete sexual intercourse without pain for improved ADL tolerance.   - Pt will report ability to tolerate speculum exam without pain for improved access to healthcare.    PLAN     Plan of Care certification: 9/5/2024 to 12/5/2024    Outpatient Physical Therapy - 1 time per week for 12 weeks to include the following interventions: Therapeutic Exercise, Manual Therapy, Therapeutic Activity, Neuromuscular Re-education, Electrical Stimulation Unattended, Self-Care, Patient Education      Shiloh Cruz, PT, DPT

## 2024-09-19 ENCOUNTER — CLINICAL SUPPORT (OUTPATIENT)
Dept: REHABILITATION | Facility: OTHER | Age: 24
End: 2024-09-19
Payer: COMMERCIAL

## 2024-09-19 DIAGNOSIS — M62.89 HIGH-TONE PELVIC FLOOR DYSFUNCTION: Primary | ICD-10-CM

## 2024-09-19 PROCEDURE — 97112 NEUROMUSCULAR REEDUCATION: CPT | Mod: PN | Performed by: PHYSICAL THERAPIST

## 2024-09-19 PROCEDURE — 97530 THERAPEUTIC ACTIVITIES: CPT | Mod: PN | Performed by: PHYSICAL THERAPIST

## 2024-09-19 NOTE — PROGRESS NOTES
Pelvic Health Physical Therapy   Treatment Note     Name: Inez Escobar  Clinic Number: [id number]    Therapy Diagnosis:   Encounter Diagnosis   Name Primary?    High-tone pelvic floor dysfunction Yes     Physician: Karie Storey MD    Visit Date: 9/19/2024    Physician Orders: PT Eval and Treat   Medical Diagnosis from Referral: Dyspareunia in female [N94.10], Pain with bowel movements [R19.8], History of syncope [Z87.898]   Evaluation Date: 5/23/2024  Last Reassessment: 9/5/2024, visit #9  Authorization Period Expiration: 12/31/2024  Plan of Care Expiration: 12/5/2024  Visit # / Visits authorized: 10/12      Time In: 16:50  Time Out: 17:20  Total Appointment Time (timed & untimed codes): 32 minutes     Precautions: universal    Subjective     Inez reports reduced dyspareunia since last visit. She has been practicing relaxing her pelvic floor. She is having increased pain with menstrual cramps recently.     She was somewhat compliant with home exercise program - exercising 1x/week and using pelvic wand more frequently  Response to previous treatment: tolerated well   Functional change: ongoing     Pain: 0/10  Location:  pelvis      Objective     Informed verbal consent provided 9/19/2024 prior to intravaginal treatment.   Chaperone: declined    Therapeutic activities to improve functional performance for 10 minutes -  [x] Education on pelvic floor anatomy & function  [] Assessment of back, abdominal wall, hips, and pelvic floor - see below  [] Review of therawand use  [] Education on nervous system upregulation on pelvic floor tension  [] Education on dilator training  [] Discussion of intercourse considerations for pelvic pain - lubricant, positioning, pillows, pelvic floor massage  [x] Hooklying piriformis stretch + diaphragmatic breathing and pelvic drop (R&L), 1 minute  [x] Instruction on techniques to reduce excess pelvic floor tension - body scanning, check-ins, diaphragmatic breathing  [x] HEP  "building/HEP review    Neuromuscular re-education activities to develop balance, coordination, kinesthetic sense, motor control, proprioception, and posture for 20 minutes -   [] Diaphragmatic breathing - unable to demonstrate appropriate despite cues  [] Pelvic floor muscle coordination training with sEMG feedback: downtraining and pelvic floor muscle squeezes - Pt presents with resting tone of 6-8 microvolts and appropriate PFM contraction on command. Pt was able to downtrain to 3-5 microvolts with PT cues for diaphragmatic breathing, relaxation, visualization, and body scanning. She struggled to consistently lower PFM tone and maintain, sometimes getting "lost" after squeezes/exercises and having trouble downtraining again. Focusing on pelvic drop/pushing out sensation was particularly helpful.   [x] TrA brace + bridging with belt around knees, x10 with 5-second hold  [x] TrA brace + straight leg raise (R&L), x8 with 5-second hold  [x] Side-lying clam with focus on pelvic girdle stability (R&L), x8 with 5-second hold  [x] Side-lying reverse clam with focus on pelvic girdle stability (R&L), x8 with 5-second hold    Manual therapy techniques: to develop flexibility, desensitization, and extensibility for 2 minutes -  [x] SI MET  [x] Long-arm manipulation of bilateral hips      Home Exercises Provided and Patient Education Provided     Education provided:   - anatomy/physiology of pelvic floor, pelvic wand use, diaphragmatic breathing, and behavior modifications  Discussed progression of plan of care with patient; educated pt in activity modification; reviewed HEP with pt. Pt demonstrated and verbalized understanding of all instruction and was provided with a handout of HEP (see Patient Instructions).    Written Home Exercises Provided: yes.  Exercises were reviewed and Inez was able to demonstrate them prior to the end of the session.  Inez demonstrated good understanding of the education provided. "     Updated HEP (9/19/24): SLR, clams, reverse clams, figure-4 stretch    Assessment     Pt tolerated treatment session well, with good understanding of education provided and no increased symptoms with exercise. Improved L hip IR range of motion after manipulation but R remained unchanged. Pt demonstrates posterior hip weakness and hip IR range of motion deficits that likely impact chronic pelvic floor tension, in addition to somatic anxiety. Focusing on hip strengthening and mobility for the next week to determine impact on pelvic floor tension/tenderness. Pt's functional mobility and ability to perform ADLs still limited by pelvic floor dysfunction. She requires skilled therapy for continued patient education and coordination retraining.      Inez is progressing well towards her goals.   Pt prognosis is Good.     Pt will continue to benefit from skilled outpatient physical therapy to address the deficits listed in the problem list box on initial evaluation, provide pt/family education and to maximize pt's level of independence in the home and community environment.     Pt's spiritual, cultural and educational needs considered and pt agreeable to plan of care and goals.     Anticipated barriers to physical therapy: school schedule       All progressing   Short Term Goals: 6 weeks   - Pt will demonstrate excellent knowledge and adherence to HEP to facilitate optimal recovery.  - Pt will demonstrate proper PFM contraction, relaxation, and lengthening coordinated with TA and breath for improved muscle coordination needed for functional activity.     Long Term Goals: 12 weeks   - Pt will demonstrate excellent knowledge and adherence to HEP for continued self-maintenance of symptoms.  - Pt will report FOTO score of 10% improvement or more indicating clinically relevant increase in function.  - Pt will demonstrate PFM strength of at least 3/5 MMT for improved strength needed to maintain continence.   - Pt will  report bearing down appropriately 100% of the time for improved bowel function and decreased stress on adjacent pelvic structures.   - Pt will demonstrate independence with pressure-management strategies to decreased stress on adjacent pelvic structures.   - Pt will be able to successfully complete sexual intercourse without pain for improved ADL tolerance.   - Pt will report ability to tolerate speculum exam without pain for improved access to healthcare.        Plan      Plan of care Certification: 9/5/24 - 12/5/24    Continue current plan of care, progressing as tolerated        Shiloh Cruz, PT, DPT

## 2024-09-26 ENCOUNTER — CLINICAL SUPPORT (OUTPATIENT)
Dept: REHABILITATION | Facility: OTHER | Age: 24
End: 2024-09-26
Payer: COMMERCIAL

## 2024-09-26 ENCOUNTER — OFFICE VISIT (OUTPATIENT)
Dept: PSYCHIATRY | Facility: CLINIC | Age: 24
End: 2024-09-26
Payer: COMMERCIAL

## 2024-09-26 ENCOUNTER — LAB VISIT (OUTPATIENT)
Dept: LAB | Facility: HOSPITAL | Age: 24
End: 2024-09-26
Payer: COMMERCIAL

## 2024-09-26 VITALS
BODY MASS INDEX: 19.87 KG/M2 | SYSTOLIC BLOOD PRESSURE: 109 MMHG | HEART RATE: 65 BPM | DIASTOLIC BLOOD PRESSURE: 71 MMHG | WEIGHT: 115.75 LBS

## 2024-09-26 DIAGNOSIS — F40.10 SOCIAL ANXIETY DISORDER: ICD-10-CM

## 2024-09-26 DIAGNOSIS — M62.89 HIGH-TONE PELVIC FLOOR DYSFUNCTION: Primary | ICD-10-CM

## 2024-09-26 DIAGNOSIS — F41.1 GENERALIZED ANXIETY DISORDER: Primary | ICD-10-CM

## 2024-09-26 DIAGNOSIS — N92.6 MISSED PERIOD: ICD-10-CM

## 2024-09-26 DIAGNOSIS — F41.8 TEST ANXIETY: ICD-10-CM

## 2024-09-26 LAB — HCG INTACT+B SERPL-ACNC: <2.4 MIU/ML

## 2024-09-26 PROCEDURE — 99999 PR PBB SHADOW E&M-EST. PATIENT-LVL III: CPT | Mod: PBBFAC,,, | Performed by: NURSE PRACTITIONER

## 2024-09-26 PROCEDURE — 84702 CHORIONIC GONADOTROPIN TEST: CPT | Performed by: NURSE PRACTITIONER

## 2024-09-26 PROCEDURE — 97530 THERAPEUTIC ACTIVITIES: CPT | Mod: PN | Performed by: PHYSICAL THERAPIST

## 2024-09-26 PROCEDURE — 97140 MANUAL THERAPY 1/> REGIONS: CPT | Mod: PN | Performed by: PHYSICAL THERAPIST

## 2024-09-26 PROCEDURE — 36415 COLL VENOUS BLD VENIPUNCTURE: CPT | Performed by: NURSE PRACTITIONER

## 2024-09-26 RX ORDER — FLUOXETINE HYDROCHLORIDE 20 MG/1
20 CAPSULE ORAL DAILY
Qty: 90 CAPSULE | Refills: 3 | Status: SHIPPED | OUTPATIENT
Start: 2024-09-26 | End: 2025-03-25

## 2024-09-26 NOTE — PATIENT INSTRUCTIONS
Myofascial Release With A Ball  Using a ball for myofascial release is another way you can begin to relax and release these muscles. Using a racquetball, tennis ball, or another small ball you have around the house (play with the size and firmness to find whats comfortable for you), place the ball to the inside of one of your sitting bones.   (Not sure where your sitting bones are? bend over slightly and you will feel two bony protrusions in the center of your butt. Thats them!!)  In this position, the ball will be on one side of your pelvic floor.  If this feels too intense, place a towel or pillow underneath you to reduce the sensation.   With the ball under you, begin to take deep breaths, imagining you are breathing your pelvic floor into the ball. This will massage the muscles, breaking up some of the tension in the pelvic floor.   Stay there for a few breaths, then move the ball slightly forward, still on one side of the pelvic floor, to just behind your vagina.  Repeat the deep breaths.   Repeat on the second side to get into all the deep muscles in this area.
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
## 2024-09-26 NOTE — PROGRESS NOTES
Pelvic Health Physical Therapy   Treatment Note     Name: Inez Escobar  Clinic Number: [id number]    Therapy Diagnosis:   Encounter Diagnosis   Name Primary?    High-tone pelvic floor dysfunction Yes       Physician: Karie Storey MD    Visit Date: 9/26/2024    Physician Orders: PT Eval and Treat   Medical Diagnosis from Referral: Dyspareunia in female [N94.10], Pain with bowel movements [R19.8], History of syncope [Z87.898]   Evaluation Date: 5/23/2024  Last Reassessment: 9/5/2024, visit #9  Authorization Period Expiration: 12/31/2024  Plan of Care Expiration: 12/5/2024  Visit # / Visits authorized: 10/12      Time In: 10:30  Time Out: 11:20  Total Appointment Time (timed & untimed codes): 46 minutes     Precautions: universal    Subjective     Inez reports significant belly pain with bowel movements. Pain only happens with type 3-4 stools when not on period. No pain with bowel movements on her period or with type 1 stools.  She has been having cramps all week due to her cycle.  Drinking lots of water and eating more fiber.    She was compliant with home exercise program  Response to previous treatment: tolerated well   Functional change: ongoing     Pain: 0/10  Location:  pelvis      Objective     Informed verbal consent provided 9/26/2024 prior to intravaginal treatment.   Chaperone: declined    Therapeutic activities to improve functional performance for 12 minutes -  [x] Education on pelvic floor anatomy & function  [x] Assessment of back, abdominal wall, hips, and pelvic floor - negative Carnett's sign at abdominal wall, tenderness and increased tone with palpation of bilateral layer 3 PFM  [] Review of therawand use  [] Education on nervous system upregulation on pelvic floor tension  [] Education on dilator training  [] Discussion of intercourse considerations for pelvic pain - lubricant, positioning, pillows, pelvic floor massage  [] Hooklying piriformis stretch + diaphragmatic breathing and  "pelvic drop (R&L), 1 minute  [x] Instruction on techniques to reduce excess pelvic floor tension - body scanning, check-ins, diaphragmatic breathing.  [x] Instruction on self-release to posterior pelvic floor with lacrosse ball  [x] HEP building/HEP review    Neuromuscular re-education activities to develop balance, coordination, kinesthetic sense, motor control, proprioception, and posture for 0 minutes -   [] Diaphragmatic breathing - unable to demonstrate appropriate despite cues  [] Pelvic floor muscle coordination training with sEMG feedback: downtraining and pelvic floor muscle squeezes - Pt presents with resting tone of 6-8 microvolts and appropriate PFM contraction on command. Pt was able to downtrain to 3-5 microvolts with PT cues for diaphragmatic breathing, relaxation, visualization, and body scanning. She struggled to consistently lower PFM tone and maintain, sometimes getting "lost" after squeezes/exercises and having trouble downtraining again. Focusing on pelvic drop/pushing out sensation was particularly helpful.   [] TrA brace + bridging with belt around knees, x10 with 5-second hold  [] TrA brace + straight leg raise (R&L), x8 with 5-second hold  [] Side-lying clam with focus on pelvic girdle stability (R&L), x8 with 5-second hold  [] Side-lying reverse clam with focus on pelvic girdle stability (R&L), x8 with 5-second hold    Manual therapy techniques: to develop flexibility, desensitization, and extensibility for 34 minutes -  [x] SI MET  [x] Long-arm manipulation of bilateral hips  [] Obturator release at ischial tuberosities + active release with resisted contralateral hip external rotation (B)  [x] Manual release to pelvic floor muscles externally: bilateral layer 3  [] Manual release to pelvic floor muscles internally/vaginally: levator ani, superficial transverse perineal, perineal body  [x] Myofascial decompression/cupping to bilateral lower back and posterior hips  [x] Grade V mid-thoracic " manipulation      Home Exercises Provided and Patient Education Provided     Education provided:   - anatomy/physiology of pelvic floor, pelvic wand use, diaphragmatic breathing, and behavior modifications  Discussed progression of plan of care with patient; educated pt in activity modification; reviewed HEP with pt. Pt demonstrated and verbalized understanding of all instruction and was provided with a handout of HEP (see Patient Instructions).    Written Home Exercises Provided: Pt instructed to continue prior HEP  Exercises were reviewed and Inez was able to demonstrate them prior to the end of the session.  Inez demonstrated good understanding of the education provided.       Assessment     Pt tolerated treatment session well, with good understanding of education provided and no increased symptoms with exercise. Posterior pelvic floor muscle tenderness did not improve with manual release to PFM or thoracic manipulation, but it did improve somewhat after cupping to lower back and posterior hips. Pt also reported reduced intensity of menstrual cramps after cupping lower back/hips. Pt demonstrates posterior hip weakness and hip IR range of motion deficits that likely impact chronic pelvic floor tension, in addition to somatic anxiety. Pt wants to hold off on therapy for 2 months to work on HEP and self-release to determine impact on symptoms. Bowel movement pain could be consistent with organ pain and may or may respond to physical therapy. PT will reassess next time, with spine/hip/pelvic girdle orthopedic assessment as well. Pt's functional mobility and ability to perform ADLs still limited by pelvic floor dysfunction. She requires skilled therapy for continued patient education and coordination retraining.      Inez is progressing well towards her goals.   Pt prognosis is Good.     Pt will continue to benefit from skilled outpatient physical therapy to address the deficits listed in the problem list  box on initial evaluation, provide pt/family education and to maximize pt's level of independence in the home and community environment.     Pt's spiritual, cultural and educational needs considered and pt agreeable to plan of care and goals.  Anticipated barriers to physical therapy: school schedule       All progressing   Short Term Goals: 6 weeks   - Pt will demonstrate excellent knowledge and adherence to HEP to facilitate optimal recovery.  - Pt will demonstrate proper PFM contraction, relaxation, and lengthening coordinated with TA and breath for improved muscle coordination needed for functional activity.     Long Term Goals: 12 weeks   - Pt will demonstrate excellent knowledge and adherence to HEP for continued self-maintenance of symptoms.  - Pt will report FOTO score of 10% improvement or more indicating clinically relevant increase in function.  - Pt will demonstrate PFM strength of at least 3/5 MMT for improved strength needed to maintain continence.   - Pt will report bearing down appropriately 100% of the time for improved bowel function and decreased stress on adjacent pelvic structures.   - Pt will demonstrate independence with pressure-management strategies to decreased stress on adjacent pelvic structures.   - Pt will be able to successfully complete sexual intercourse without pain for improved ADL tolerance.   - Pt will report ability to tolerate speculum exam without pain for improved access to healthcare.        Plan      Plan of care Certification: 9/5/24 - 12/5/24    Continue current plan of care, progressing as tolerated  Pt will return to therapy in late November        Shiloh Cruz, PT, DPT

## 2024-09-27 ENCOUNTER — PATIENT MESSAGE (OUTPATIENT)
Dept: PSYCHIATRY | Facility: CLINIC | Age: 24
End: 2024-09-27
Payer: COMMERCIAL

## 2024-10-10 ENCOUNTER — LAB VISIT (OUTPATIENT)
Dept: LAB | Facility: HOSPITAL | Age: 24
End: 2024-10-10
Attending: NURSE PRACTITIONER
Payer: COMMERCIAL

## 2024-10-10 ENCOUNTER — OFFICE VISIT (OUTPATIENT)
Dept: PRIMARY CARE CLINIC | Facility: CLINIC | Age: 24
End: 2024-10-10
Payer: COMMERCIAL

## 2024-10-10 VITALS
DIASTOLIC BLOOD PRESSURE: 70 MMHG | HEIGHT: 64 IN | WEIGHT: 118.19 LBS | HEART RATE: 71 BPM | BODY MASS INDEX: 20.18 KG/M2 | SYSTOLIC BLOOD PRESSURE: 100 MMHG | OXYGEN SATURATION: 99 %

## 2024-10-10 DIAGNOSIS — R53.83 FATIGUE, UNSPECIFIED TYPE: ICD-10-CM

## 2024-10-10 DIAGNOSIS — F41.1 GENERALIZED ANXIETY DISORDER: ICD-10-CM

## 2024-10-10 DIAGNOSIS — R53.83 FATIGUE, UNSPECIFIED TYPE: Primary | ICD-10-CM

## 2024-10-10 DIAGNOSIS — M25.50 ARTHRALGIA, UNSPECIFIED JOINT: ICD-10-CM

## 2024-10-10 PROBLEM — F31.9 BIPOLAR DISORDER: Status: RESOLVED | Noted: 2021-11-17 | Resolved: 2024-10-10

## 2024-10-10 LAB
25(OH)D3+25(OH)D2 SERPL-MCNC: 31 NG/ML (ref 30–96)
ALBUMIN SERPL BCP-MCNC: 4.2 G/DL (ref 3.5–5.2)
ALP SERPL-CCNC: 81 U/L (ref 55–135)
ALT SERPL W/O P-5'-P-CCNC: 24 U/L (ref 10–44)
ANION GAP SERPL CALC-SCNC: 9 MMOL/L (ref 8–16)
AST SERPL-CCNC: 23 U/L (ref 10–40)
BASOPHILS # BLD AUTO: 0.04 K/UL (ref 0–0.2)
BASOPHILS NFR BLD: 0.8 % (ref 0–1.9)
BILIRUB SERPL-MCNC: 0.7 MG/DL (ref 0.1–1)
BUN SERPL-MCNC: 10 MG/DL (ref 6–20)
CALCIUM SERPL-MCNC: 9.9 MG/DL (ref 8.7–10.5)
CHLORIDE SERPL-SCNC: 104 MMOL/L (ref 95–110)
CO2 SERPL-SCNC: 24 MMOL/L (ref 23–29)
CREAT SERPL-MCNC: 0.8 MG/DL (ref 0.5–1.4)
CRP SERPL-MCNC: <0.3 MG/L (ref 0–8.2)
DIFFERENTIAL METHOD BLD: NORMAL
EOSINOPHIL # BLD AUTO: 0.1 K/UL (ref 0–0.5)
EOSINOPHIL NFR BLD: 2.5 % (ref 0–8)
ERYTHROCYTE [DISTWIDTH] IN BLOOD BY AUTOMATED COUNT: 12.1 % (ref 11.5–14.5)
ERYTHROCYTE [SEDIMENTATION RATE] IN BLOOD BY PHOTOMETRIC METHOD: <2 MM/HR (ref 0–36)
EST. GFR  (NO RACE VARIABLE): >60 ML/MIN/1.73 M^2
FERRITIN SERPL-MCNC: 113 NG/ML (ref 20–300)
GLUCOSE SERPL-MCNC: 82 MG/DL (ref 70–110)
HCT VFR BLD AUTO: 42.3 % (ref 37–48.5)
HGB BLD-MCNC: 14.2 G/DL (ref 12–16)
IMM GRANULOCYTES # BLD AUTO: 0.01 K/UL (ref 0–0.04)
IMM GRANULOCYTES NFR BLD AUTO: 0.2 % (ref 0–0.5)
IRON SERPL-MCNC: 140 UG/DL (ref 30–160)
LYMPHOCYTES # BLD AUTO: 2.2 K/UL (ref 1–4.8)
LYMPHOCYTES NFR BLD: 42.1 % (ref 18–48)
MCH RBC QN AUTO: 29.8 PG (ref 27–31)
MCHC RBC AUTO-ENTMCNC: 33.6 G/DL (ref 32–36)
MCV RBC AUTO: 89 FL (ref 82–98)
MONOCYTES # BLD AUTO: 0.6 K/UL (ref 0.3–1)
MONOCYTES NFR BLD: 11.2 % (ref 4–15)
NEUTROPHILS # BLD AUTO: 2.3 K/UL (ref 1.8–7.7)
NEUTROPHILS NFR BLD: 43.2 % (ref 38–73)
NRBC BLD-RTO: 0 /100 WBC
PLATELET # BLD AUTO: 319 K/UL (ref 150–450)
PMV BLD AUTO: 11.3 FL (ref 9.2–12.9)
POTASSIUM SERPL-SCNC: 3.9 MMOL/L (ref 3.5–5.1)
PROT SERPL-MCNC: 7.4 G/DL (ref 6–8.4)
RBC # BLD AUTO: 4.76 M/UL (ref 4–5.4)
SATURATED IRON: 38 % (ref 20–50)
SODIUM SERPL-SCNC: 137 MMOL/L (ref 136–145)
T4 FREE SERPL-MCNC: 0.99 NG/DL (ref 0.71–1.51)
TOTAL IRON BINDING CAPACITY: 370 UG/DL (ref 250–450)
TRANSFERRIN SERPL-MCNC: 250 MG/DL (ref 200–375)
TSH SERPL DL<=0.005 MIU/L-ACNC: 1.36 UIU/ML (ref 0.4–4)
VIT B12 SERPL-MCNC: 1496 PG/ML (ref 210–950)
WBC # BLD AUTO: 5.27 K/UL (ref 3.9–12.7)

## 2024-10-10 PROCEDURE — 85652 RBC SED RATE AUTOMATED: CPT | Performed by: NURSE PRACTITIONER

## 2024-10-10 PROCEDURE — 80053 COMPREHEN METABOLIC PANEL: CPT | Performed by: NURSE PRACTITIONER

## 2024-10-10 PROCEDURE — 86038 ANTINUCLEAR ANTIBODIES: CPT | Performed by: NURSE PRACTITIONER

## 2024-10-10 PROCEDURE — 83540 ASSAY OF IRON: CPT | Performed by: NURSE PRACTITIONER

## 2024-10-10 PROCEDURE — 3008F BODY MASS INDEX DOCD: CPT | Mod: CPTII,S$GLB,, | Performed by: NURSE PRACTITIONER

## 2024-10-10 PROCEDURE — 36415 COLL VENOUS BLD VENIPUNCTURE: CPT | Performed by: NURSE PRACTITIONER

## 2024-10-10 PROCEDURE — 84439 ASSAY OF FREE THYROXINE: CPT | Performed by: NURSE PRACTITIONER

## 2024-10-10 PROCEDURE — 86235 NUCLEAR ANTIGEN ANTIBODY: CPT | Performed by: NURSE PRACTITIONER

## 2024-10-10 PROCEDURE — 82306 VITAMIN D 25 HYDROXY: CPT | Performed by: NURSE PRACTITIONER

## 2024-10-10 PROCEDURE — 84443 ASSAY THYROID STIM HORMONE: CPT | Performed by: NURSE PRACTITIONER

## 2024-10-10 PROCEDURE — 82607 VITAMIN B-12: CPT | Performed by: NURSE PRACTITIONER

## 2024-10-10 PROCEDURE — 3074F SYST BP LT 130 MM HG: CPT | Mod: CPTII,S$GLB,, | Performed by: NURSE PRACTITIONER

## 2024-10-10 PROCEDURE — 99214 OFFICE O/P EST MOD 30 MIN: CPT | Mod: S$GLB,,, | Performed by: NURSE PRACTITIONER

## 2024-10-10 PROCEDURE — 99999 PR PBB SHADOW E&M-EST. PATIENT-LVL III: CPT | Mod: PBBFAC,,, | Performed by: NURSE PRACTITIONER

## 2024-10-10 PROCEDURE — 3078F DIAST BP <80 MM HG: CPT | Mod: CPTII,S$GLB,, | Performed by: NURSE PRACTITIONER

## 2024-10-10 PROCEDURE — 86039 ANTINUCLEAR ANTIBODIES (ANA): CPT | Performed by: NURSE PRACTITIONER

## 2024-10-10 PROCEDURE — 85025 COMPLETE CBC W/AUTO DIFF WBC: CPT | Performed by: NURSE PRACTITIONER

## 2024-10-10 PROCEDURE — 82728 ASSAY OF FERRITIN: CPT | Performed by: NURSE PRACTITIONER

## 2024-10-10 PROCEDURE — 86140 C-REACTIVE PROTEIN: CPT | Performed by: NURSE PRACTITIONER

## 2024-10-10 NOTE — PROGRESS NOTES
"[facility] Note    Chief Complaint      Chief Complaint   Patient presents with    Fatigue    Insomnia       History of Present Illness      History of Present Illness    CHIEF COMPLAINT:  Inez presents today for follow up on multiple health concerns, primarily related to menstrual issues.    MENSTRUAL AND GYNECOLOGICAL HISTORY:  She reports heavy periods lasting two weeks with blood clots and significant cramping, despite having an IUD for three years. Previously, she experienced light periods with the IUD. A few months ago, she had an ovarian cyst that resolved spontaneously. She mentions a possible recent chemical pregnancy. She also notes mood swings and worsening of joint pain, particularly in her knees and hips, during her menstrual period.    PELVIC FLOOR ISSUES:  She has been attending pelvic floor therapy for a few months and performing prescribed exercises at home. She reports slight improvement in pain during sexual intercourse, but continues to experience some discomfort. She notes no improvement in painful bowel movements. A healthcare provider attributed her pelvic floor pain and tight hips to her pelvic floor muscles working overtime.    FATIGUE AND SLEEP:  She reports persistent fatigue, describing herself as "a very just tired person," which worsens significantly around her menstrual period. She recently started taking B12 supplements due to slightly low levels. Since starting B12 approximately one week ago, she has experienced difficulty falling asleep and recent onset of insomnia. She reports a recent instance of not falling asleep until 7:30 AM.    TEMPERATURE REGULATION:  She reports a significant change in her temperature regulation, now experiencing frequent episodes of feeling hot and sweating, including profuse sweating during sleep. She denies any connection between these symptoms and her menstrual cycle.    MUSCULOSKELETAL CONCERNS:  She reports difficulty gaining " muscle despite being active and going to the gym regularly. She experiences joint pain, particularly in her knees and hips, which worsens during her menstrual period and interferes with her sleep. She has a history of dance throughout her life and notes that she has never been particularly muscular.    GASTROINTESTINAL ISSUES:  She reports ongoing severe, painful bowel movements intense enough to cause near-fainting episodes. She has a previous diagnosis of Irritable Bowel Syndrome (IBS). She expresses concern that symptoms have not improved despite a reduction in anxiety. A gastroenterologist previously attributed symptoms to anxiety.    DIZZINESS:  She reports experiencing frequent episodes of dizziness, which worsen during her menstrual period.    PSYCHIATRIC HISTORY:  She had a history of bipolar disorder, now determined to be a misdiagnosis. She is currently diagnosed with anxiety and reports significant improvement with medication (Prozac) and therapy, including EMDR. She acknowledges still being an anxious person but states symptoms are much better managed now.    APPETITE:  She reports fluctuations in appetite correlating with her menstrual cycle, experiencing periods of excessive hunger alternating with times of diminished appetite.    ROS:  General: +fatigue, +appetite changes  Musculoskeletal: +joint pain  Neurological: +dizziness  Psychiatric: +sleep difficulty  Endocrine: +excessive sweating         Past Medical History:  Past Medical History:   Diagnosis Date    Anxiety     Bipolar 1 disorder     Depression      of psychiatric care     Psychiatric exam requested by [facility]     Psychiatric problem     Therapy        Past Surgical History:   has no past surgical history on file.    Family History:  family history includes ADD / ADHD in her mother; Alcohol abuse in her father and paternal grandmother; Anxiety disorder in her father, maternal grandmother, and mother; Arrhythmia in her maternal  grandmother; Bipolar disorder in her paternal grandfather; Congenital heart disease in her maternal grandmother; Depression in her father, maternal grandmother, and mother; Diabetes in her father, maternal grandmother, and paternal grandmother; Drug abuse in her father, maternal aunt, and paternal grandmother; Emphysema in her maternal grandfather; Heart disease in her father and maternal grandmother; Mental illness in her father, maternal aunt, maternal aunt, maternal grandmother, and mother; Migraines in her mother; Miscarriages / Stillbirths in her mother; Multiple sclerosis in her paternal grandmother; No Known Problems in her brother and sister.     Social History:  Social History     Tobacco Use    Smoking status: Never    Smokeless tobacco: Never   Substance Use Topics    Alcohol use: Yes     Comment: rare, every few months    Drug use: Never         Medications:  Outpatient Encounter Medications as of 10/10/2024   Medication Sig Dispense Refill    cyanocobalamin, vitamin B-12, 5,000 mcg/mL Drop Place under the tongue.      fluconazole (DIFLUCAN) 150 MG Tab TAKE 1 TABLET BY MOUTH NOW AND IF NO IMPROVEMENT IN 3 DAYS TAKE SECOND TABLET (Patient not taking: Reported on 10/10/2024) 2 tablet 0    FLUoxetine 20 MG capsule Take 20 mg by mouth once daily. (Patient not taking: Reported on 10/10/2024)      FLUoxetine 20 MG capsule Take 1 capsule (20 mg total) by mouth once daily. 90 capsule 3    ondansetron (ZOFRAN) 8 MG tablet  (Patient not taking: Reported on 10/10/2024)      valACYclovir (VALTREX) 500 MG tablet TAKE 1 TABLET BY MOUTH TWICE DAILY FOR 7 DAYS (Patient not taking: Reported on 10/10/2024) 30 tablet 1     Facility-Administered Encounter Medications as of 10/10/2024   Medication Dose Route Frequency Provider Last Rate Last Admin    levonorgestreL 20 mcg/24 hours (5 yrs) 52 mg IUD 1 Intra Uterine Device  1 Intra Uterine Device Karie Mcpherson MD   1 Intra Uterine Device at 09/23/20 2921  "      Allergies:  Review of patient's allergies indicates:  No Known Allergies    Health Maintenance:  Immunization History   Administered Date(s) Administered    DTaP 01/21/2003, 01/21/2003, 05/05/2003, 05/05/2003, 08/05/2003, 08/05/2003, 09/09/2004, 09/09/2004, 02/11/2008, 02/11/2008    HPV 9-Valent 08/01/2019, 08/01/2019, 09/02/2020, 09/02/2020, 06/14/2021    Hep B / HiB 2002, 2002, 03/13/2003, 03/13/2003, 11/17/2003, 11/17/2003    Hepatitis A, Pediatric/Adolescent, 2 Dose 08/01/2019, 08/01/2019, 09/04/2020    IPV 01/21/2003, 01/21/2003, 05/05/2003, 05/05/2003, 02/06/2004, 02/06/2004, 09/09/2004, 09/09/2004, 02/05/2009, 02/05/2009    MMR 02/06/2004, 02/06/2004, 02/11/2008, 02/11/2008    Meningococcal B, OMV 09/04/2020, 06/14/2021    Meningococcal Conjugate (MCV4P) 06/25/2014, 06/25/2014, 08/01/2019, 08/01/2019    Pneumococcal Conjugate - 7 Valent 2002, 2002, 03/13/2003, 03/13/2003, 07/23/2003, 07/23/2003, 09/09/2004, 09/09/2004    Tdap 06/25/2014, 06/25/2014    Varicella 11/17/2003, 11/17/2003, 02/11/2008, 02/11/2008      Health Maintenance   Topic Date Due    Hepatitis C Screening  Never done    Pap Smear  Never done    TETANUS VACCINE  06/25/2024    Chlamydia Screening  05/09/2025    Lipid Panel  Completed    HPV Vaccines  Completed        Physical Exam      Vital Signs  Pulse: 71  SpO2: 99 %  BP: 100/70  Pain Score: 0-No pain  Height and Weight  Height: 5' 4" (162.6 cm)  Weight: 53.6 kg (118 lb 2.7 oz)  BSA (Calculated - sq m): 1.56 sq meters  BMI (Calculated): 20.3  Weight in (lb) to have BMI = 25: 145.3]    Physical Exam  Constitutional:       Appearance: She is well-developed.   HENT:      Head: Normocephalic and atraumatic.   Cardiovascular:      Rate and Rhythm: Normal rate and regular rhythm.      Heart sounds: Normal heart sounds. No murmur heard.  Pulmonary:      Effort: Pulmonary effort is normal. No respiratory distress.      Breath sounds: Normal breath sounds.   Abdominal: "      General: There is no distension.      Palpations: Abdomen is soft.      Tenderness: There is no abdominal tenderness. There is no guarding.   Skin:     General: Skin is warm and dry.   Neurological:      Mental Status: She is alert. Mental status is at baseline.   Psychiatric:         Behavior: Behavior normal.          Laboratory:  CBC:  Recent Labs   Lab 05/12/22  1003 01/10/23  0815 01/03/24  0935   WBC 4.87 5.31 8.00   RBC 4.33 4.41 4.57   Hemoglobin 13.2 13.6 14.1   Hematocrit 39.4 38.9 40.9   Platelets 306 271 377   MCV 91 88 90   MCH 30.5 30.8 30.9   MCHC 33.5 35.0 34.5     CMP:  Recent Labs   Lab 05/12/22  1003 01/10/23  0815 01/03/24  0935   Glucose 88 91 90   Calcium 9.7 9.1 9.5   Albumin 3.9 3.8 4.2   Total Protein 7.0 6.8 7.5   Sodium 137 138 135 L   Potassium 4.1 4.4 4.1   CO2 25 23 24   Chloride 104 108 103   BUN 10 14 9   Alkaline Phosphatase 71 68 81   ALT 33 13 20   AST 33 17 21   Total Bilirubin 0.5 0.7 0.7     URINALYSIS:  Recent Labs   Lab 01/03/24  0941   Color, UA Colorless A   Specific Gravity, UA 1.010   pH, UA 7.0   Protein, UA Negative   Nitrite, UA Negative   Leukocytes, UA Negative      LIPIDS:  Recent Labs   Lab 05/12/22  1003 01/10/23  0815   TSH 0.778 1.427   HDL  --  54   Cholesterol  --  163   Triglycerides  --  60   LDL Cholesterol  --  97.0   HDL/Cholesterol Ratio  --  33.1   Non-HDL Cholesterol  --  109   Total Cholesterol/HDL Ratio  --  3.0     TSH:  Recent Labs   Lab 05/12/22  1003 01/10/23  0815   TSH 0.778 1.427     A1C:        Assessment/Plan     Inez Escobar is a 23 y.o.female with:    Assessment & Plan    Considered anemia or low iron as potential cause of patient's symptoms, including fatigue, dizziness, and heavy menstrual bleeding  Evaluated possibility of autoimmune disease contributing to diffuse joint pain and other symptoms  Removed bipolar diagnosis from patient's profile based on recent psychiatric evaluation and improved symptoms off bipolar  medication  Considered potential hormonal imbalance (estrogen/progesterone) as contributing factor to menstrual symptoms and PMDD-like presentation    DEPRESSION:  - Continued Prozac at current dose.    MOOD DISORDER:  - Discontinued Lamictal, pt reports no longer taking.    LABS:  - Ordered CBC, CMP to assess blood count, kidney and liver function.  - Ordered iron panel.  - Ordered thyroid panel.  - Ordered vitamin B12 level.  - Ordered vitamin D level.  - Ordered autoimmune screening panel.  - Ordered anti-inflammatory markers.    GYNECOLOGICAL FOLLOW-UP:  - Follow up with Dr. Siddiqi (gynecologist) as scheduled for tomorrow.    FOLLOW UP:  - Contact the office to review lab results, especially if any abnormalities are found.  - Message the doctor through the patient portal for lab result review if not all results are back before the gynecologist appointment.         1. Fatigue, unspecified type  - CBC W/ AUTO DIFFERENTIAL; Future  - COMPREHENSIVE METABOLIC PANEL; Future  - Iron and TIBC; Future  - Ferritin; Future  - TSH; Future  - T4, FREE; Future  - VITAMIN B12; Future  - HERNANDEZ Screen w/Reflex; Future  - Vitamin D 25 hydroxy; Future    2. Arthralgia, unspecified joint  - HERNANDEZ Screen w/Reflex; Future  - Sedimentation rate; Future  - C-REACTIVE PROTEIN; Future    3. Generalized anxiety disorder  Stable. Continue current meds.  Continue follow up with Psyc.     Chronic conditions status updated as per HPI.  Other than changes above, cont current medications and maintain follow up with specialists.  No follow-ups on file.    Future Appointments   Date Time Provider Department Center   10/11/2024 10:00 AM Kaykay Rock DNP [facility]   11/25/2024 10:45 AM Shiloh Cruz, PT [facility] [place]       This note was generated with the assistance of ambient listening technology. Verbal consent was obtained by the patient and accompanying visitor(s) for the recording of patient appointment to facilitate  this note. I attest to having reviewed and edited the generated note for accuracy, though some syntax or spelling errors may persist. Please contact the author of this note for any clarification.      Adrienne Cotaya, FNP [facility]    Answers submitted by the patient for this visit:  Review of Systems Questionnaire (Submitted on 10/9/2024)  activity change: No  unexpected weight change: No  neck pain: No  hearing loss: No  rhinorrhea: No  trouble swallowing: No  eye discharge: No  visual disturbance: No  chest tightness: No  wheezing: No  chest pain: No  palpitations: No  blood in stool: No  constipation: Yes  vomiting: No  diarrhea: Yes  polydipsia: No  polyuria: No  difficulty urinating: No  hematuria: No  menstrual problem: Yes  dysuria: No  joint swelling: No  arthralgias: Yes  headaches: Yes  weakness: Yes  confusion: No  dysphoric mood: No

## 2024-10-11 ENCOUNTER — PATIENT MESSAGE (OUTPATIENT)
Dept: PRIMARY CARE CLINIC | Facility: CLINIC | Age: 24
End: 2024-10-11
Payer: COMMERCIAL

## 2024-10-11 ENCOUNTER — OFFICE VISIT (OUTPATIENT)
Dept: OBSTETRICS AND GYNECOLOGY | Facility: CLINIC | Age: 24
End: 2024-10-11
Payer: COMMERCIAL

## 2024-10-11 VITALS
WEIGHT: 120.25 LBS | DIASTOLIC BLOOD PRESSURE: 70 MMHG | BODY MASS INDEX: 20.64 KG/M2 | SYSTOLIC BLOOD PRESSURE: 100 MMHG

## 2024-10-11 DIAGNOSIS — Z12.4 CERVICAL CANCER SCREENING: ICD-10-CM

## 2024-10-11 DIAGNOSIS — Z01.419 ENCOUNTER FOR WELL WOMAN EXAM WITH ROUTINE GYNECOLOGICAL EXAM: Primary | ICD-10-CM

## 2024-10-11 DIAGNOSIS — N94.6 DYSMENORRHEA: ICD-10-CM

## 2024-10-11 DIAGNOSIS — B37.0 ORAL THRUSH: Primary | ICD-10-CM

## 2024-10-11 PROCEDURE — 88142 CYTOPATH C/V THIN LAYER: CPT

## 2024-10-11 PROCEDURE — 99395 PREV VISIT EST AGE 18-39: CPT | Mod: S$GLB,,,

## 2024-10-11 PROCEDURE — 1159F MED LIST DOCD IN RCRD: CPT | Mod: CPTII,S$GLB,,

## 2024-10-11 PROCEDURE — 3074F SYST BP LT 130 MM HG: CPT | Mod: CPTII,S$GLB,,

## 2024-10-11 PROCEDURE — 99999 PR PBB SHADOW E&M-EST. PATIENT-LVL III: CPT | Mod: PBBFAC,,,

## 2024-10-11 PROCEDURE — 3008F BODY MASS INDEX DOCD: CPT | Mod: CPTII,S$GLB,,

## 2024-10-11 PROCEDURE — 3078F DIAST BP <80 MM HG: CPT | Mod: CPTII,S$GLB,,

## 2024-10-11 NOTE — TELEPHONE ENCOUNTER
Pt f/u on 10/10 office visit states that she forgot to mention that she gets recurring yeast infections and oral thrush     She mentioned during today's appt with obgyn who suggested that she inform her PCP

## 2024-10-11 NOTE — PROGRESS NOTES
OBSTETRICS AND GYNECOLOGY    Chief Complaint:  Well Woman Exam     HPI:      Inez Escobar is a 23 y.o.  who presents today for well woman exam.  Today with multiple complaints.     Has hx of painful heavy periods and has been told she may have endometriosis. Has been on OCP's in the past and still had long heavy painful periods.  Currently with Mirena IUD in place since 2020.  LMP: Patient's last menstrual period was 2024. Reports in the last 6 months periods have gotten heavier and longer sometimes lasting around 2 weeks.  Endorses pain and cramping originally was improved with IUD and now it has returned as well. Additionally has fatigue, joint pain, back pain, and anxiety during periods.  Feels like she is only having 1 good week out of the month.  Endorses pain with sex (has been to PFPT).  Additionally has painful BM's after her periods to the point of almost passing out (has been to multiple GI docotrs and was told it was anxiety and constipation). Recently had visit with PCP and was told to follow up with GYN as she may need to have workup for endometriosis.    Additionally pt reports she was a week late for her last period so she took a home pregnancy test and it was positive x2.  Reports she read the test after 3 minutes. She took 3 more tests that afternoon and they were negative. Saw her psych doctor who ordered an HCG and it was negative. PCP told her she may have had a chemical pregnancy.    Also with concerns about oral thrush.  Has hx of recurrent vaginitis (BV and yeast) and has been on maintenance Fluconazole in the past. Usually gets the yeast infections after her periods. Takes vaginal health probiotics. No vaginal symptoms today. Forgot to mention oral thrush to PCP when she saw her.    No other specific gyn issues, problems, or complaints today.  Specifically, patient denies abnormal discharge/odor or dysuria/hematuria. Ms. Escobar is currently sexually active  with a single male partner. She is currently using Mirena inserted in 2020 for contraception. She declines STD screening today.     Previous Pap: None  STD/STI Hx: Neg  Social: Wears seatbelts. Exercises. Feels safe at home.   Smoking status: Never  Gardasil:Completed   Ms. Escobar confirms that she wears her seatbelt when riding in the car and does not text while driving.     FH:  Breast cancer: none  Colon cancer: none  Ovarian cancer: none  Endometrial cancer: none    OB History          0    Para   0    Term   0       0    AB   0    Living   0         SAB   0    IAB   0    Ectopic   0    Multiple   0    Live Births   0                 ROS:     GENERAL: Feeling well overall.   SKIN: Denies rash.   HEENT: Denies headaches or vision changes.   CARDIOVASCULAR: Denies chest pain.   RESP: Denies shortness of breath.  BREASTS: Denies lumps or nipple discharge.   ABD: Denies constipation, diarrhea, nausea, vomiting.  URINARY: Denies dysuria, hematuria.  NEUROLOGIC: Denies syncope, falls.     Physical Exam:      PHYSICAL EXAM:  /70   Wt 54.5 kg (120 lb 4.2 oz)   LMP 2024   BMI 20.64 kg/m²   Body mass index is 20.64 kg/m².     APPEARANCE:  Well nourished, well developed, in no acute distress.  Able to smile appropriately during our encounter. Makes eye contact. Pleasant.  PSYCH: Appropriate mood and affect.  SKIN:   No acne or hirsutism.  CARDIOVASCULAR:  No edema of peripheral extremities. Well perfused throughout.  RESP:  No accessory muscle use to breathe. Speaking comfortably in complete sentences.   BREASTS:  Symmetrical, with no visible skin lesions or scars, no palpable masses. No nipple discharge or peau d'orange bilaterally. No palpable axillary LAD.  ABDOMEN:  Soft. No tenderness or masses.    PELVIC:  Normal external genitalia without lesions. Normal hair distribution. Adequate perineal body, normal urethral meatus. Vagina moist and well rugated. Without lesions, without  discharge. Cervix pink, without ectocervical lesions, discharge or tenderness.  IUD strings visualized at cervical os, 1-1.5 inches longer. No ectropion. No significant cystocele or rectocele.  Bimanual exam shows uterus to be normal size, regular, mobile and nontender.  Adnexa without masses or tenderness.    Gyn note:    A female chaperone was present for the breast and pelvic exam.     Assessment/Plan:     Encounter for well woman exam with routine gynecological exam  -     Counseled patient regarding healthy diet and regular exercise, daily multivitamin, daily seat belt use.         -     BP normotensive        -     She denies abuse and feels safe at home.        -     Pap smear:  performed         -     Contraception:  IUD in place        -     STD screening:  declined     Cervical cancer screening  -     Liquid-Based Pap Smear, Screening    Dysmenorrhea  -     US Pelvis Comp with Transvag NON-OB (xpd; Future; Expected date: 10/11/2024    Pap performed. Pelvic exam WNL. IUD strings visualized and normal length. No signs of infection.  Extensive laboratory workup started by PCP.  For the most part normal, reviewed with patient, some lab results still pending.    Recommend following back up with PCP for concerns of oral thrush and ongoing candidiasis infections.    Follow up for TVUS and visit with Dr. Storey to discuss concerns further.    Counseling:     Patient was counseled today on current ASCCP pap guidelines, the recommendation for yearly physical exams, safe driving habits, and breast self awareness. She is to see her PCP for other health maintenance.     Use of the [employer] Patient Portal discussed and encouraged during today's visit.     Kaykay Rock (Maggie), ROSETTA  Obstetrics and Gynecology  [facility]

## 2024-10-12 ENCOUNTER — PATIENT MESSAGE (OUTPATIENT)
Dept: OBSTETRICS AND GYNECOLOGY | Facility: CLINIC | Age: 24
End: 2024-10-12
Payer: COMMERCIAL

## 2024-10-12 LAB
ANA PATTERN 1: NORMAL
ANA SER QL IF: POSITIVE
ANA TITR SER IF: NORMAL {TITER}

## 2024-10-14 LAB
ANTI SM ANTIBODY: 0.1 RATIO (ref 0–0.99)
ANTI SM/RNP ANTIBODY: 0.21 RATIO (ref 0–0.99)
ANTI-SM INTERPRETATION: NEGATIVE
ANTI-SM/RNP INTERPRETATION: NEGATIVE
ANTI-SSA ANTIBODY: 0.09 RATIO (ref 0–0.99)
ANTI-SSA INTERPRETATION: NEGATIVE
ANTI-SSB ANTIBODY: 0.09 RATIO (ref 0–0.99)
ANTI-SSB INTERPRETATION: NEGATIVE
DSDNA AB SER-ACNC: NORMAL [IU]/ML

## 2024-10-15 ENCOUNTER — TELEPHONE (OUTPATIENT)
Dept: PRIMARY CARE CLINIC | Facility: CLINIC | Age: 24
End: 2024-10-15
Payer: COMMERCIAL

## 2024-10-15 ENCOUNTER — OFFICE VISIT (OUTPATIENT)
Dept: OBSTETRICS AND GYNECOLOGY | Facility: CLINIC | Age: 24
End: 2024-10-15
Payer: COMMERCIAL

## 2024-10-15 VITALS
WEIGHT: 113.31 LBS | BODY MASS INDEX: 19.35 KG/M2 | SYSTOLIC BLOOD PRESSURE: 109 MMHG | HEIGHT: 64 IN | DIASTOLIC BLOOD PRESSURE: 68 MMHG

## 2024-10-15 DIAGNOSIS — R53.83 FATIGUE, UNSPECIFIED TYPE: ICD-10-CM

## 2024-10-15 DIAGNOSIS — Z30.431 IUD CHECK UP: Primary | ICD-10-CM

## 2024-10-15 DIAGNOSIS — M25.50 ARTHRALGIA, UNSPECIFIED JOINT: ICD-10-CM

## 2024-10-15 DIAGNOSIS — R76.8 POSITIVE ANA (ANTINUCLEAR ANTIBODY): Primary | ICD-10-CM

## 2024-10-15 PROCEDURE — 1159F MED LIST DOCD IN RCRD: CPT | Mod: CPTII,S$GLB,, | Performed by: STUDENT IN AN ORGANIZED HEALTH CARE EDUCATION/TRAINING PROGRAM

## 2024-10-15 PROCEDURE — 99999 PR PBB SHADOW E&M-EST. PATIENT-LVL III: CPT | Mod: PBBFAC,,, | Performed by: STUDENT IN AN ORGANIZED HEALTH CARE EDUCATION/TRAINING PROGRAM

## 2024-10-15 PROCEDURE — 3074F SYST BP LT 130 MM HG: CPT | Mod: CPTII,S$GLB,, | Performed by: STUDENT IN AN ORGANIZED HEALTH CARE EDUCATION/TRAINING PROGRAM

## 2024-10-15 PROCEDURE — 3078F DIAST BP <80 MM HG: CPT | Mod: CPTII,S$GLB,, | Performed by: STUDENT IN AN ORGANIZED HEALTH CARE EDUCATION/TRAINING PROGRAM

## 2024-10-15 PROCEDURE — 99213 OFFICE O/P EST LOW 20 MIN: CPT | Mod: S$GLB,,, | Performed by: STUDENT IN AN ORGANIZED HEALTH CARE EDUCATION/TRAINING PROGRAM

## 2024-10-15 PROCEDURE — 3008F BODY MASS INDEX DOCD: CPT | Mod: CPTII,S$GLB,, | Performed by: STUDENT IN AN ORGANIZED HEALTH CARE EDUCATION/TRAINING PROGRAM

## 2024-10-15 RX ORDER — KETOCONAZOLE 20 MG/ML
SHAMPOO, SUSPENSION TOPICAL
COMMUNITY
Start: 2024-04-10

## 2024-10-15 RX ORDER — ONDANSETRON 4 MG/1
TABLET, ORALLY DISINTEGRATING ORAL
COMMUNITY
Start: 2024-06-18

## 2024-10-15 NOTE — TELEPHONE ENCOUNTER
----- Message from Marce sent at 10/15/2024  3:20 PM CDT -----  Contact: [phone] Patient  2TESTRESULTS    Type: Test Results    What test was performed? Blood work    Who ordered the test? PCP    When and where were the test performed? [facility] 10/10/24    Would you like a call back and or thru [facility]: both    Comments: Pt states results can back abnormal and needs clarification on next steps to take.

## 2024-10-15 NOTE — PROGRESS NOTES
OBSTETRICS AND GYNECOLOGY    Subjective:      Chief Complaint:   IUD check up    HPI:  Inez Escobar is an 23 y.o.  presenting with concerns of IUD position. S/p US with confirmation of correct location. Patient reports menses have been worse in pain and bleeding amount again. This has been new pattern for last several months now. Working with pelvic floor PT and compliant with exercises at home but does not note improvement. Also saw GI. Wondering about endometriosis.     OB History    Para Term  AB Living   0 0 0 0 0 0   SAB IAB Ectopic Multiple Live Births   0 0 0 0 0     Past Medical History:   Diagnosis Date    Anxiety     Bipolar 1 disorder     Depression     Hx of psychiatric care     Psychiatric exam requested by authority     Psychiatric problem     Therapy      History reviewed. No pertinent surgical history.  Family History   Problem Relation Name Age of Onset    ADD / ADHD Mother Rica Meany     Anxiety disorder Mother Rica Meany     Migraines Mother Rica Meany     Depression Mother Rica Meany     Mental illness Mother Rica Meany     Miscarriages / Stillbirths Mother Rica Meany     Anxiety disorder Father Carlos Enrique Lawtono     Diabetes Father Carlos Enrique Niardo     Alcohol abuse Father Carlos Enriquetha Castillociardo     Depression Father Carlos Enrique Castillociardo     Drug abuse Father Carlos Enrique Niardo     Heart disease Father Carlos Enrique Niardo     Mental illness Father Carlos Enrique Lawtono     No Known Problems Sister      No Known Problems Brother      Drug abuse Maternal Aunt Laury Meany     Mental illness Maternal Aunt Laury Meany     Mental illness Maternal Aunt Edita Meany     Emphysema Maternal Grandfather      Anxiety disorder Maternal Grandmother Mayra Meany     Arrhythmia Maternal Grandmother Mayra Meany         A flutter    Congenital heart disease Maternal Grandmother Mayra Meany     Diabetes Maternal Grandmother Mayra Meany     Depression Maternal  Grandmother Mayra Fischer     Heart disease Maternal Grandmother Mayra Fischer     Mental illness Maternal Grandmother Mayra Fischer     Bipolar disorder Paternal Grandfather      Multiple sclerosis Paternal Grandmother Brandy Escobar     Diabetes Paternal Grandmother Brandy Escobar     Alcohol abuse Paternal Grandmother Brandy Escobar     Drug abuse Paternal Grandmother Brandy Escobar     Heart attacks under age 50 Neg Hx      Early death Neg Hx      Pacemaker/defibrilator Neg Hx      Breast cancer Neg Hx      Colon cancer Neg Hx      Ovarian cancer Neg Hx         Allergies: Review of patient's allergies indicates:  No Known Allergies    Medications:   Current Outpatient Medications   Medication Sig Dispense Refill    cyanocobalamin, vitamin B-12, 5,000 mcg/mL Drop Place under the tongue.      FLUoxetine 20 MG capsule Take 1 capsule (20 mg total) by mouth once daily. 90 capsule 3    ketoconazole (NIZORAL) 2 % shampoo       ondansetron (ZOFRAN-ODT) 4 MG TbDL DISSOLVE 1 TABLET UNDER THE TONGUE EVERY 4 TO 6 HOURS AS NEEDED      fluconazole (DIFLUCAN) 150 MG Tab TAKE 1 TABLET BY MOUTH NOW AND IF NO IMPROVEMENT IN 3 DAYS TAKE SECOND TABLET (Patient not taking: Reported on 10/15/2024) 2 tablet 0    FLUoxetine 20 MG capsule Take 20 mg by mouth once daily. (Patient not taking: Reported on 10/10/2024)      valACYclovir (VALTREX) 500 MG tablet TAKE 1 TABLET BY MOUTH TWICE DAILY FOR 7 DAYS (Patient not taking: Reported on 10/15/2024) 30 tablet 1     Current Facility-Administered Medications   Medication Dose Route Frequency Provider Last Rate Last Admin    levonorgestreL 20 mcg/24 hours (5 yrs) 52 mg IUD 1 Intra Uterine Device  1 Intra Uterine Device Karie Mcpherson MD   1 Intra Uterine Device at 09/23/20 1415       Social History     Tobacco Use    Smoking status: Never    Smokeless tobacco: Never   Substance Use Topics    Alcohol use: Yes     Comment: rare, every few months       Objective:   /68   Ht 5'  "4" (1.626 m)   Wt 51.4 kg (113 lb 5.1 oz)   LMP 09/25/2024   BMI 19.45 kg/m²   Physical Exam    GENERAL: Alert, well dressed, well nourished. Appropriate mood and affect. Pleasant.  HEENT: Normocephalic, atraumatic. Extraocular movements intact. Hearing and vision grossly intact.   PULMONARY: No respiratory distress. No use of accessory muscles of respiration. No cyanosis. Speaking comfortably in full sentences.   CARDIAC: Well perfused.    EXTREMITIES: No visible rashes.     Assessment/Plan:       Prelim US report with IUD in correct position  US otherwise WNL  Wondering about oral thrush - rec ENT consult / pending PCP advice  Patient interested in pursuing definitive endometriosis evaluation  Follow up with Dr. Storey - can be virtual - to discuss            As of April 1, 2021, the Cures Act has been passed nationally. This new law requires that all doctors progress notes, lab results, pathology reports and radiology reports be released IMMEDIATELY to the patient in the patient portal. That means that the results are released to you at the EXACT same time they are released to me. Therefore, with all of the patients that I have I am not able to reply to each patient exactly when the results come in. So there will be a delay from when you see the results to when I see them and have time to come up with a response to send you. Also I only see these results when I am on the computer at work. So if the results come in over the weekend or after 5 pm of a work day, I will not see them until the next business day. As you can tell, this is a challenge as a physician to give every patient the quick response they hope for and deserve. So please be patient!   Thanks for your understanding and patience.    "

## 2024-10-15 NOTE — Clinical Note
Patient of yours, interested in diagnostic lap for endometriosis. Scheduling virtual with you to discuss!

## 2024-10-16 ENCOUNTER — TELEPHONE (OUTPATIENT)
Dept: OBSTETRICS AND GYNECOLOGY | Facility: CLINIC | Age: 24
End: 2024-10-16
Payer: COMMERCIAL

## 2024-10-16 ENCOUNTER — PATIENT MESSAGE (OUTPATIENT)
Dept: OBSTETRICS AND GYNECOLOGY | Facility: CLINIC | Age: 24
End: 2024-10-16
Payer: COMMERCIAL

## 2024-10-16 PROBLEM — F32.1 CURRENT MODERATE EPISODE OF MAJOR DEPRESSIVE DISORDER WITHOUT PRIOR EPISODE: Status: RESOLVED | Noted: 2024-10-16 | Resolved: 2024-10-16

## 2024-10-16 PROBLEM — F32.1 CURRENT MODERATE EPISODE OF MAJOR DEPRESSIVE DISORDER WITHOUT PRIOR EPISODE: Status: ACTIVE | Noted: 2024-10-16

## 2024-10-16 NOTE — TELEPHONE ENCOUNTER
Lvm that I scheduled for the first available vv. Call back or message if that time and does not work.

## 2024-10-16 NOTE — TELEPHONE ENCOUNTER
----- Message from Karie Storey MD sent at 10/16/2024  2:20 PM CDT -----  Ok sounds good. Kareem please schedule virtual  ----- Message -----  From: Lisa Omer MD  Sent: 10/15/2024   3:11 PM CDT  To: Karie Storey MD    Patient of yours, interested in diagnostic lap for endometriosis. Scheduling virtual with you to discuss!

## 2024-10-17 LAB
FINAL PATHOLOGIC DIAGNOSIS: NORMAL
Lab: NORMAL

## 2024-10-18 ENCOUNTER — TELEPHONE (OUTPATIENT)
Dept: PRIMARY CARE CLINIC | Facility: CLINIC | Age: 24
End: 2024-10-18
Payer: COMMERCIAL

## 2024-10-18 RX ORDER — NYSTATIN 100000 [USP'U]/ML
SUSPENSION ORAL
Qty: 60 ML | Refills: 1 | Status: SHIPPED | OUTPATIENT
Start: 2024-10-18

## 2024-10-18 NOTE — TELEPHONE ENCOUNTER
----- Message from Rola sent at 10/18/2024 12:33 PM CDT -----  Contact: Mobile  [phone]  Patient would like for you to fax her latest lab results, and her last two Office notes, to [facility].         [facility], fax# [phone], phone# [phone].

## 2024-11-04 NOTE — PROGRESS NOTES
Otolaryngology Clinic Note    Subjective:       Patient ID: Inez Escobar is a 24 y.o. female.    Chief Complaint: Thrush      History of Present Illness: Inez Escobar is a 24 y.o. female presenting with possible thrush    Patient is here today for complaints of possible oral thrush. She was started in nystatin x7 days by her PCP on 10/18 when she sent a message saying she thought she has thrush. She only took a couple days worth because she did not like the taste. She is currently being referred to rheumatology for pos HERNANDEZ.     She states she has noticed white coating and a dry mouth for a couple years. She does use a tongue . She drinks at least 64oz of fluid daily. She drinks a soda daily and occ coffee. She is seen regularly by her dentist Q6 months and they never mentioned anything about her having yeast on her tongue. She denies any tongue pain or any other symptoms of yeast.  She has taken diflucan numerous times for vaginal yeast infection and it did not change the appearance of her tongue. She has even been on diflucan for around 3 months and she did not notice any change in her tongue. She does get cerumen buildup as well and has to have her ears cleaned in the past. She does use debrox and OTC scoopers.     No past surgical history on file.  Past Medical History:   Diagnosis Date    Anxiety     Bipolar 1 disorder     Depression     Hx of psychiatric care     Psychiatric exam requested by [facility]     Psychiatric problem     Therapy      Social Drivers of Health     Tobacco Use: Low Risk  (10/15/2024)    Patient History     Smoking Tobacco Use: Never     Smokeless Tobacco Use: Never     Passive Exposure: Not on file   Alcohol Use: Not At Risk (1/9/2024)    AUDIT-C     Frequency of Alcohol Consumption: Monthly or less     Average Number of Drinks: 1 or 2     Frequency of Binge Drinking: Never   Financial Resource Strain: Low Risk  (1/9/2024)    Overall Financial Resource  Strain (CARDIA)     Difficulty of Paying Living Expenses: Not very hard   Food Insecurity: Food Insecurity Present (1/9/2024)    Hunger Vital Sign     Worried About Running Out of Food in the Last Year: Sometimes true     Ran Out of Food in the Last Year: Never true   Transportation Needs: No Transportation Needs (1/9/2024)    PRAPARE - Transportation     Lack of Transportation (Medical): No     Lack of Transportation (Non-Medical): No   Physical Activity: Sufficiently Active (1/9/2024)    Exercise Vital Sign     Days of Exercise per Week: 5 days     Minutes of Exercise per Session: 30 min   Stress: Stress Concern Present (1/9/2024)    [employer] - Occupational Stress Questionnaire     Feeling of Stress : To some extent   Housing Stability: Unknown (1/9/2024)    Housing Stability Vital Sign     Unable to Pay for Housing in the Last Year: Patient declined     Number of Places Lived in the Last Year: 2     Unstable Housing in the Last Year: No   Depression: Low Risk  (1/3/2024)    Depression     Last PHQ-4: Flowsheet Data: 0   Utilities: Not on file   Health Literacy: Not on file   Social Isolation: Not on file     Review of patient's allergies indicates:  No Known Allergies  Current Outpatient Medications   Medication Instructions    FLUoxetine 20 mg, Daily    FLUoxetine 20 mg, Oral, Daily         ENT ROS negative except as stated above.     Patient answers are not available for this visit.            Objective:      There were no vitals filed for this visit.    General: NAD, well appearing  Eyes: Normal conjunctiva and lids  Face: symmetric, nerve intact  Nose: The nose is without any evidence of any deformity. The nasal mucosa is moist. The septum is midline. There is no evidence of septal hematoma. The turbinates are without abnormality.   Ears: The ears are with normal-appearing pinna. Examination of the canals is normal appearing bilaterally. There is no drainage or erythema noted.  The tympanic membranes are normal appearing with pearly color, normal-appearing landmarks and normal light reflex. Hearing is grossly intact.  Mouth: No obvious abnormalities to the lips. The teeth are unremarkable. The gingivae are without any obvious evidence of infection or lesion. The oral mucosa is dry but pink. There are no obvious masses to the hard or soft palate.   Oropharynx: The uvula is midline.  The tongue is midline and pink and normal in appearance. No white patches noted. The posterior pharynx is without erythema or exudate. The tonsils are normal appearing.  Salivary glands: The salivary glands are symmetric and not enlarged, no masses  Neck: No lymphadenopathy, trachea midline, thryoid not enlarged.  Psych: Normal mood and affect.   Neuro: Grossly intact  Speech: fluent    Review: I personally reviewed PCP notes    Procedure:   Cerumen impaction removal  Indications: Cerumen impaction  Verbal consent obtained.   Under microscope, wax was removed from right and left ear using suction.   Patient tolerated procedure well.      Assessment and Plan:       1. Xerostomia    2. Bilateral impacted cerumen        - No concerns for yeast infection on today's exam  - Continue tongue   - Continue good oral hygiene  - Dry mouth can cause thick mucous and coating on tongue  - Appoint with rheumatology already scheduled, 1/3/25  - Autoimmune disorders can cause frequent yeast infections as well as dry mouth  - Can try biotene for dry mouth   - Can use Debrox for cerumen, be careful with ear buds as they can push wax in further.     RTC: prn    Plan of care was discussed in detail with the patient, who agreed with the plan as above. All questions were answered in detail.     Dana Ramirez, FNP-C  Otolaryngology

## 2024-11-05 ENCOUNTER — OFFICE VISIT (OUTPATIENT)
Dept: OTOLARYNGOLOGY | Facility: CLINIC | Age: 24
End: 2024-11-05
Payer: COMMERCIAL

## 2024-11-05 VITALS — HEIGHT: 64 IN | WEIGHT: 122.81 LBS | BODY MASS INDEX: 20.97 KG/M2

## 2024-11-05 DIAGNOSIS — K11.7 XEROSTOMIA: Primary | ICD-10-CM

## 2024-11-05 DIAGNOSIS — H61.23 BILATERAL IMPACTED CERUMEN: ICD-10-CM

## 2024-11-05 PROCEDURE — 1159F MED LIST DOCD IN RCRD: CPT | Mod: CPTII,S$GLB,, | Performed by: NURSE PRACTITIONER

## 2024-11-05 PROCEDURE — 99999 PR PBB SHADOW E&M-EST. PATIENT-LVL III: CPT | Mod: PBBFAC,,, | Performed by: NURSE PRACTITIONER

## 2024-11-05 PROCEDURE — 3008F BODY MASS INDEX DOCD: CPT | Mod: CPTII,S$GLB,, | Performed by: NURSE PRACTITIONER

## 2024-11-05 PROCEDURE — 99213 OFFICE O/P EST LOW 20 MIN: CPT | Mod: 25,S$GLB,, | Performed by: NURSE PRACTITIONER

## 2024-11-05 PROCEDURE — 69210 REMOVE IMPACTED EAR WAX UNI: CPT | Mod: 50,S$GLB,, | Performed by: NURSE PRACTITIONER

## 2024-11-08 ENCOUNTER — PATIENT MESSAGE (OUTPATIENT)
Dept: OBSTETRICS AND GYNECOLOGY | Facility: CLINIC | Age: 24
End: 2024-11-08

## 2024-11-08 ENCOUNTER — OFFICE VISIT (OUTPATIENT)
Dept: OBSTETRICS AND GYNECOLOGY | Facility: CLINIC | Age: 24
End: 2024-11-08
Attending: OBSTETRICS & GYNECOLOGY
Payer: COMMERCIAL

## 2024-11-08 DIAGNOSIS — N94.6 DYSMENORRHEA: Primary | ICD-10-CM

## 2024-11-08 DIAGNOSIS — R10.2 FEMALE PELVIC PAIN: ICD-10-CM

## 2024-11-08 NOTE — PROGRESS NOTES
The patient location is: [place]  The chief complaint leading to consultation is: discuss surgery    Visit type: audiovisual    Face to Face time with patient: 12 min  15 minutes of total time spent on the encounter, which includes face to face time and non-face to face time preparing to see the patient (eg, review of tests), Obtaining and/or reviewing separately obtained history, Documenting clinical information in the electronic or other health record, Independently interpreting results (not separately reported) and communicating results to the patient/family/caregiver, or Care coordination (not separately reported).         Each patient to whom he or she provides medical services by telemedicine is:  (1) informed of the relationship between the physician and patient and the respective role of any other health care provider with respect to management of the patient; and (2) notified that he or she may decline to receive medical services by telemedicine and may withdraw from such care at any time.    Notes: Here to discuss her pelvic pain and painful periods. She and I have discussed this in the past. She also has a lot of issues with constipation and GI problems. Last visit I suggested pelvic floor PT which has helped some with the dyspareunia but not with the pelvic pain with periods. She has a Mirena in place. The pain is definitely better than it was before the Mirena but she feels that it is just gradually getting worse and worse. She would like to do a diagnostic laparoscopy and would like to replace her Mirena with a new Mirena because the best that the pain was was when it was first inserted. We discussed the surgery in detail, pros can cons. All questions answered. After discussion she would like to move forward. Date options given- Dec 5, January 2 or January 8. She will discuss with her mom and let me know. All questions answered.

## 2024-11-25 ENCOUNTER — CLINICAL SUPPORT (OUTPATIENT)
Dept: REHABILITATION | Facility: OTHER | Age: 24
End: 2024-11-25
Payer: COMMERCIAL

## 2024-11-25 DIAGNOSIS — M62.89 HIGH-TONE PELVIC FLOOR DYSFUNCTION: Primary | ICD-10-CM

## 2024-11-25 PROCEDURE — 97530 THERAPEUTIC ACTIVITIES: CPT | Mod: PN | Performed by: PHYSICAL THERAPIST

## 2024-11-25 NOTE — PROGRESS NOTES
Pelvic Health Physical Therapy   Treatment Note     Name: Inez Escobar  Clinic Number: [id number]    Therapy Diagnosis:   Encounter Diagnosis   Name Primary?    High-tone pelvic floor dysfunction Yes       Physician: Karie Storey MD    Visit Date: 11/25/2024    Physician Orders: PT Eval and Treat   Medical Diagnosis from Referral: Dyspareunia in female [N94.10], Pain with bowel movements [R19.8], History of syncope [Z87.898]   Evaluation Date: 5/23/2024  Last Reassessment: 9/5/2024, visit #9  Authorization Period Expiration: 12/31/2024  Plan of Care Expiration: 12/5/2024  Visit # / Visits authorized: 11/12      Time In: 10:45  Time Out: 11:15  Total Appointment Time (timed & untimed codes): 25 minutes     Precautions: universal    Subjective     Inez reports continued significant pelvic pain around her period and will be undergoing an exploratory laparoscopy for suspected endometriosis next month.  More normal stool, still sometimes constipated. Hasn't been painful.  RLQ pain at rest and with intercourse and with micturition  Back pain has been worse recently.    She was compliant with home exercise program - core and hip exercise, not using wand  Response to previous treatment: tolerated well   Functional change: ongoing     Pain: 0/10  Location:  pelvis      Objective     Informed verbal consent provided 11/25/2024 prior to intravaginal treatment.   Chaperone: declined    Therapeutic activities to improve functional performance for 25 minutes -  [x] Education on pelvic floor anatomy & function  [x] Education on PT management of endometriosis  [x] Assessment of abdominal wall and pelvic floor - negative Carnett's sign at abdominal wall, no tenderness to palpation of hip flexors, bilateral increased tone with intravaginal palpation of bilateral PFM globally but no tenderness  [] Review of therawand use  [] Education on nervous system upregulation on pelvic floor tension  [] Education on dilator  "training  [] Discussion of intercourse considerations for pelvic pain - lubricant, positioning, pillows, pelvic floor massage  [] Hooklying piriformis stretch + diaphragmatic breathing and pelvic drop (R&L), 1 minute  [] Instruction on techniques to reduce excess pelvic floor tension - body scanning, check-ins, diaphragmatic breathing.  [] Instruction on self-release to posterior pelvic floor with lacrosse ball  [x] HEP building/HEP review    Neuromuscular re-education activities to develop balance, coordination, kinesthetic sense, motor control, proprioception, and posture for 0 minutes -   [] Diaphragmatic breathing - unable to demonstrate appropriate despite cues  [] Pelvic floor muscle coordination training with sEMG feedback: downtraining and pelvic floor muscle squeezes - Pt presents with resting tone of 6-8 microvolts and appropriate PFM contraction on command. Pt was able to downtrain to 3-5 microvolts with PT cues for diaphragmatic breathing, relaxation, visualization, and body scanning. She struggled to consistently lower PFM tone and maintain, sometimes getting "lost" after squeezes/exercises and having trouble downtraining again. Focusing on pelvic drop/pushing out sensation was particularly helpful.   [] TrA brace + bridging with belt around knees, x10 with 5-second hold  [] TrA brace + straight leg raise (R&L), x8 with 5-second hold  [] Side-lying clam with focus on pelvic girdle stability (R&L), x8 with 5-second hold  [] Side-lying reverse clam with focus on pelvic girdle stability (R&L), x8 with 5-second hold    Manual therapy techniques: to develop flexibility, desensitization, and extensibility for 0 minutes -  [] SI MET  [] Long-arm manipulation of bilateral hips  [] Obturator release at ischial tuberosities + active release with resisted contralateral hip external rotation (B)  [] Manual release to pelvic floor muscles externally: bilateral layer 3  [] Manual release to pelvic floor muscles " internally/vaginally: levator ani, superficial transverse perineal, perineal body  [] Myofascial decompression/cupping to bilateral lower back and posterior hips  [] Grade V mid-thoracic manipulation      Home Exercises Provided and Patient Education Provided     Education provided:   - anatomy/physiology of pelvic floor, pelvic wand use, diaphragmatic breathing, and behavior modifications  Discussed progression of plan of care with patient; educated pt in activity modification; reviewed HEP with pt. Pt demonstrated and verbalized understanding of all instruction and was provided with a handout of HEP (see Patient Instructions).    Written Home Exercises Provided: Pt instructed to continue prior HEP  Exercises were reviewed and Inez was able to demonstrate them prior to the end of the session.  Inez demonstrated good understanding of the education provided.       Assessment     Pt tolerated treatment session well, with good understanding of education provided. Abdominal wall and pelvic floor assessment revealed no significant myofascial dysfunction aside from global increased tension - negative Carnett's sign at abdominal wall, no tenderness to palpation of hip flexors, bilateral increased tone with intravaginal palpation of bilateral PFM globally but no tenderness. Pt's continued pain may be related to endometriosis. Therapy on hold after today's visit, and PT will resume following laparoscopy, if needed.    Inez is progressing well towards her goals.   Pt prognosis is Good.     Pt will continue to benefit from skilled outpatient physical therapy to address the deficits listed in the problem list box on initial evaluation, provide pt/family education and to maximize pt's level of independence in the home and community environment.     Pt's spiritual, cultural and educational needs considered and pt agreeable to plan of care and goals.  Anticipated barriers to physical therapy: school schedule       All  progressing   Short Term Goals: 6 weeks   - Pt will demonstrate excellent knowledge and adherence to HEP to facilitate optimal recovery.  - Pt will demonstrate proper PFM contraction, relaxation, and lengthening coordinated with TA and breath for improved muscle coordination needed for functional activity.     Long Term Goals: 12 weeks   - Pt will demonstrate excellent knowledge and adherence to HEP for continued self-maintenance of symptoms.  - Pt will report FOTO score of 10% improvement or more indicating clinically relevant increase in function.  - Pt will demonstrate PFM strength of at least 3/5 MMT for improved strength needed to maintain continence.   - Pt will report bearing down appropriately 100% of the time for improved bowel function and decreased stress on adjacent pelvic structures.   - Pt will demonstrate independence with pressure-management strategies to decreased stress on adjacent pelvic structures.   - Pt will be able to successfully complete sexual intercourse without pain for improved ADL tolerance.   - Pt will report ability to tolerate speculum exam without pain for improved access to healthcare.        Plan      Plan of care Certification: 9/5/24 - 12/5/24    Therapy on hold after today's visit      Shiloh Cruz, PT, DPT

## 2024-11-29 ENCOUNTER — TELEPHONE (OUTPATIENT)
Dept: OBSTETRICS AND GYNECOLOGY | Facility: CLINIC | Age: 24
End: 2024-11-29
Payer: COMMERCIAL

## 2024-11-29 DIAGNOSIS — Z30.433 ENCOUNTER FOR REMOVAL AND REINSERTION OF INTRAUTERINE CONTRACEPTIVE DEVICE (IUD): ICD-10-CM

## 2024-11-29 DIAGNOSIS — R10.2 FEMALE PELVIC PAIN: Primary | ICD-10-CM

## 2024-11-29 NOTE — TELEPHONE ENCOUNTER
Apologize for me for not reaching out sooner. My fault. She will be the third case now. I will send you the new order      Requested Date:  Dec 5    Requested Time:  12:30 pm    Case Length:60 minutes    Surgeon: Karie Storey      Assistant Surgeon: Alicja Garcia   Visit Type: Outpatient    LOCATION: [facility]    PROCEDURE:Diagnostic laparoscopy, IUD removal and reinsertion  Diagnosis:pelvic pain  Anesthesia type: General   Comments/Special Equipment Needed:  Rep needed: No  Does the patient need a PreOp appointment with MD: No  U/S needed at pre-op: No  PCP clearance: Not Needed  When does she need her Post op appointment: 2 weeks in person  Do you need additional time blocked out from clinic? Yes  If so, what time do you want to be back in clinic? out  When can the patient go back to work? one week

## 2024-12-04 ENCOUNTER — ANESTHESIA EVENT (OUTPATIENT)
Dept: SURGERY | Facility: HOSPITAL | Age: 24
End: 2024-12-04
Payer: COMMERCIAL

## 2024-12-04 NOTE — PRE-PROCEDURE INSTRUCTIONS
The following was discussed with pt via phone and sent to pt portal. Pt verbalized understanding. Pt to be accompanied by her mother.    Dear Inez ,    You are scheduled for a procedure with Dr. Storey on 12/5/2024. Your scheduled arrival time is 12:45pm.  This arrival time is roughly 2 hours before your anticipated procedure time to allow sufficient time for pre-op.  Please wear comfortable clothes.  This procedure will take place at the [facility] at the corner of [address] and [address].  It is in the [facility] [place] [facility] to [facility].  The address is:    [address].  [place] [id number]    After entering the building, you will proceed to the second floor where you can check in with registration. You should take any medications that you routinely take for blood pressure (other than those listed below), heart medications, thyroid, cholesterol, etc.     If you wear contact lenses, please wear glasses to your procedure.    Your fasting instructions are as follow:  Nothing to eat after midnight the evening before your surgery. Anesthesia encourages you to drink clear liquids up until 2 hours prior to your arrival time to ensure that you are adequately hydrated. You MUST have a responsible adult to bring you home.      The evening before and morning of your procedure, please hold the following medications:  -Aspirin and Aspirin-containing products (Goody's powder, Excedrin)  -NSAIDs (Advil, Ibuprofen, Aleve, Diclofenac)  -Vitamins/Supplements  -Herbal remedies/Teas  -Stimulants (Adderall, Vyvanse, Adipex)  -Diabetic medication (Please bring with you day of procedure)  -Prescription creams/gels/lotions    -May take Tylenol      The evening before and morning of your procedure, take a shower using antibacterial soap (ex: Hibiclens or Dial antibacterial soap). DO NOT apply deodorant, lotion, cologne, or anything else to the skin. Wear loose, comfortable fitting  clothing. Do not wear jewelry or bring any valuables with you. If you wear dentures or contacts, please bring your case with you or leave them at home. Use and bring any inhalers that you may have.    If you have any procedure-specific questions, please call your surgeon's office. Any other questions, don't hesitate to call at [phone].    Thanks,  ADILIA Joel  Pre-Admit Testing  Anesthesia Dept OC

## 2024-12-05 ENCOUNTER — ANESTHESIA (OUTPATIENT)
Dept: SURGERY | Facility: HOSPITAL | Age: 24
End: 2024-12-05
Payer: COMMERCIAL

## 2024-12-13 ENCOUNTER — PATIENT MESSAGE (OUTPATIENT)
Dept: OBSTETRICS AND GYNECOLOGY | Facility: CLINIC | Age: 24
End: 2024-12-13
Payer: COMMERCIAL

## 2024-12-13 ENCOUNTER — TELEPHONE (OUTPATIENT)
Dept: OBSTETRICS AND GYNECOLOGY | Facility: CLINIC | Age: 24
End: 2024-12-13
Payer: COMMERCIAL

## 2024-12-13 NOTE — PRE-PROCEDURE INSTRUCTIONS
The following was discussed with pt via phone and sent to pt portal. Pt verbalized understanding. Pt to be accompanied by her mother.    Dear Inez ,    You are scheduled for a procedure with Dr. Storey on 12/16/2024. Your scheduled arrival time is 10:30am.  This arrival time is roughly 2 hours before your anticipated procedure time to allow sufficient time for pre-op.  Please wear comfortable clothes.  This procedure will take place at the [facility] at the corner of [address] and [address].  It is in the [facility] [place] next to [facility].  The address is:    [address].  [place] [address]    After entering the building, you will proceed to the second floor where you can check in with registration. You should take any medications that you routinely take for blood pressure (other than those listed below), heart medications, thyroid, cholesterol, etc.     If you wear contact lenses, please wear glasses to your procedure.    Your fasting instructions are as follow:  Nothing to eat after midnight the evening before your surgery. Anesthesia encourages you to drink clear liquids up until 2 hours prior to your arrival time to ensure that you are adequately hydrated. You MUST have a responsible adult to bring you home.      The evening before and morning of your procedure, please hold the following medications:  -Aspirin and Aspirin-containing products (Goody's powder, Excedrin)  -NSAIDs (Advil, Ibuprofen, Aleve, Diclofenac)  -Vitamins/Supplements  -Herbal remedies/Teas  -Stimulants (Adderall, Vyvanse, Adipex)  -Diabetic medication (Please bring with you day of procedure)  -Prescription creams/gels/lotions    -May take Tylenol      The evening before and morning of your procedure, take a shower using antibacterial soap (ex: Hibiclens or Dial antibacterial soap). DO NOT apply deodorant, lotion, cologne, or anything else to the skin. Wear loose, comfortable fitting  clothing. Do not wear jewelry or bring any valuables with you. If you wear dentures or contacts, please bring your case with you or leave them at home. Use and bring any inhalers that you may have.    If you have any procedure-specific questions, please call your surgeon's office. Any other questions, don't hesitate to call at [phone].    Thanks,  ADILIA Joel  Pre-Admit Testing  Anesthesia Dept OC

## 2024-12-16 ENCOUNTER — TELEPHONE (OUTPATIENT)
Dept: OBSTETRICS AND GYNECOLOGY | Facility: CLINIC | Age: 24
End: 2024-12-16
Payer: COMMERCIAL

## 2024-12-16 ENCOUNTER — HOSPITAL ENCOUNTER (OUTPATIENT)
Facility: HOSPITAL | Age: 24
Discharge: HOME | End: 2024-12-16
Attending: OBSTETRICS & GYNECOLOGY | Admitting: OBSTETRICS & GYNECOLOGY
Payer: COMMERCIAL

## 2024-12-16 VITALS
TEMPERATURE: 98 F | HEIGHT: 64 IN | HEART RATE: 89 BPM | DIASTOLIC BLOOD PRESSURE: 66 MMHG | OXYGEN SATURATION: 96 % | WEIGHT: 122.81 LBS | BODY MASS INDEX: 20.97 KG/M2 | RESPIRATION RATE: 14 BRPM | SYSTOLIC BLOOD PRESSURE: 100 MMHG

## 2024-12-16 DIAGNOSIS — R10.2 PELVIC PAIN: Primary | ICD-10-CM

## 2024-12-16 DIAGNOSIS — Z30.433 ENCOUNTER FOR IUD REMOVAL AND REINSERTION: ICD-10-CM

## 2024-12-16 DIAGNOSIS — R10.2 FEMALE PELVIC PAIN: ICD-10-CM

## 2024-12-16 PROCEDURE — 25000003 PHARM REV CODE 250: Performed by: OBSTETRICS & GYNECOLOGY

## 2024-12-16 PROCEDURE — 27201423 OPTIME MED/SURG SUP & DEVICES STERILE SUPPLY: Performed by: OBSTETRICS & GYNECOLOGY

## 2024-12-16 PROCEDURE — 37000008 HC ANESTHESIA 1ST 15 MINUTES: Performed by: OBSTETRICS & GYNECOLOGY

## 2024-12-16 PROCEDURE — 58301 REMOVE INTRAUTERINE DEVICE: CPT | Mod: ,,, | Performed by: OBSTETRICS & GYNECOLOGY

## 2024-12-16 PROCEDURE — 63600175 PHARM REV CODE 636 W HCPCS: Performed by: OBSTETRICS & GYNECOLOGY

## 2024-12-16 PROCEDURE — 94761 N-INVAS EAR/PLS OXIMETRY MLT: CPT

## 2024-12-16 PROCEDURE — 25000003 PHARM REV CODE 250: Performed by: NURSE ANESTHETIST, CERTIFIED REGISTERED

## 2024-12-16 PROCEDURE — 36000709 HC OR TIME LEV III EA ADD 15 MIN: Performed by: OBSTETRICS & GYNECOLOGY

## 2024-12-16 PROCEDURE — 99900035 HC TECH TIME PER 15 MIN (STAT)

## 2024-12-16 PROCEDURE — 63600175 PHARM REV CODE 636 W HCPCS: Performed by: NURSE ANESTHETIST, CERTIFIED REGISTERED

## 2024-12-16 PROCEDURE — 49320 DIAG LAPARO SEPARATE PROC: CPT | Mod: ,,, | Performed by: OBSTETRICS & GYNECOLOGY

## 2024-12-16 PROCEDURE — 49320 DIAG LAPARO SEPARATE PROC: CPT | Mod: 82,,, | Performed by: STUDENT IN AN ORGANIZED HEALTH CARE EDUCATION/TRAINING PROGRAM

## 2024-12-16 PROCEDURE — 63600175 PHARM REV CODE 636 W HCPCS: Mod: JZ,JG | Performed by: NURSE ANESTHETIST, CERTIFIED REGISTERED

## 2024-12-16 PROCEDURE — 25000003 PHARM REV CODE 250: Performed by: ANESTHESIOLOGY

## 2024-12-16 PROCEDURE — 71000015 HC POSTOP RECOV 1ST HR: Performed by: OBSTETRICS & GYNECOLOGY

## 2024-12-16 PROCEDURE — 58300 INSERT INTRAUTERINE DEVICE: CPT | Mod: ,,, | Performed by: OBSTETRICS & GYNECOLOGY

## 2024-12-16 PROCEDURE — 37000009 HC ANESTHESIA EA ADD 15 MINS: Performed by: OBSTETRICS & GYNECOLOGY

## 2024-12-16 PROCEDURE — 71000033 HC RECOVERY, INTIAL HOUR: Performed by: OBSTETRICS & GYNECOLOGY

## 2024-12-16 PROCEDURE — 36000708 HC OR TIME LEV III 1ST 15 MIN: Performed by: OBSTETRICS & GYNECOLOGY

## 2024-12-16 PROCEDURE — 63600175 PHARM REV CODE 636 W HCPCS: Performed by: ANESTHESIOLOGY

## 2024-12-16 DEVICE — IMPLANTABLE DEVICE: Type: IMPLANTABLE DEVICE | Site: CERVIX | Status: FUNCTIONAL

## 2024-12-16 RX ORDER — DEXTROSE, SODIUM CHLORIDE, SODIUM LACTATE, POTASSIUM CHLORIDE, AND CALCIUM CHLORIDE 5; .6; .31; .03; .02 G/100ML; G/100ML; G/100ML; G/100ML; G/100ML
INJECTION, SOLUTION INTRAVENOUS CONTINUOUS
Status: DISCONTINUED | OUTPATIENT
Start: 2024-12-16 | End: 2024-12-16 | Stop reason: HOSPADM

## 2024-12-16 RX ORDER — GLUCAGON 1 MG
1 KIT INJECTION
Status: DISCONTINUED | OUTPATIENT
Start: 2024-12-16 | End: 2024-12-16 | Stop reason: HOSPADM

## 2024-12-16 RX ORDER — IBUPROFEN 800 MG/1
800 TABLET ORAL EVERY 8 HOURS PRN
Qty: 15 TABLET | Refills: 0 | Status: SHIPPED | OUTPATIENT
Start: 2024-12-16

## 2024-12-16 RX ORDER — OXYCODONE HYDROCHLORIDE 5 MG/1
10 TABLET ORAL EVERY 4 HOURS PRN
Status: DISCONTINUED | OUTPATIENT
Start: 2024-12-16 | End: 2024-12-16

## 2024-12-16 RX ORDER — MELOXICAM 15 MG/1
15 TABLET ORAL DAILY
COMMUNITY

## 2024-12-16 RX ORDER — FENTANYL CITRATE 50 UG/ML
25 INJECTION, SOLUTION INTRAMUSCULAR; INTRAVENOUS EVERY 5 MIN PRN
Status: DISCONTINUED | OUTPATIENT
Start: 2024-12-16 | End: 2024-12-16 | Stop reason: HOSPADM

## 2024-12-16 RX ORDER — CEFAZOLIN SODIUM 1 G/3ML
INJECTION, POWDER, FOR SOLUTION INTRAMUSCULAR; INTRAVENOUS
Status: DISCONTINUED | OUTPATIENT
Start: 2024-12-16 | End: 2024-12-16

## 2024-12-16 RX ORDER — ACETAMINOPHEN 10 MG/ML
INJECTION, SOLUTION INTRAVENOUS
Status: DISCONTINUED | OUTPATIENT
Start: 2024-12-16 | End: 2024-12-16

## 2024-12-16 RX ORDER — IBUPROFEN 200 MG
600 TABLET ORAL EVERY 6 HOURS PRN
Status: DISCONTINUED | OUTPATIENT
Start: 2024-12-16 | End: 2024-12-16

## 2024-12-16 RX ORDER — OXYCODONE AND ACETAMINOPHEN 5; 325 MG/1; MG/1
1 TABLET ORAL EVERY 4 HOURS PRN
Qty: 10 TABLET | Refills: 0 | Status: SHIPPED | OUTPATIENT
Start: 2024-12-16

## 2024-12-16 RX ORDER — AMOXICILLIN 250 MG
1 CAPSULE ORAL 2 TIMES DAILY
Status: DISCONTINUED | OUTPATIENT
Start: 2024-12-16 | End: 2024-12-16 | Stop reason: HOSPADM

## 2024-12-16 RX ORDER — ROCURONIUM BROMIDE 10 MG/ML
INJECTION, SOLUTION INTRAVENOUS
Status: DISCONTINUED | OUTPATIENT
Start: 2024-12-16 | End: 2024-12-16

## 2024-12-16 RX ORDER — ONDANSETRON HYDROCHLORIDE 2 MG/ML
4 INJECTION, SOLUTION INTRAVENOUS DAILY PRN
Status: DISCONTINUED | OUTPATIENT
Start: 2024-12-16 | End: 2024-12-16 | Stop reason: HOSPADM

## 2024-12-16 RX ORDER — CEFAZOLIN 2 G/1
2 INJECTION, POWDER, FOR SOLUTION INTRAMUSCULAR; INTRAVENOUS
Status: DISCONTINUED | OUTPATIENT
Start: 2024-12-16 | End: 2024-12-16 | Stop reason: HOSPADM

## 2024-12-16 RX ORDER — PROCHLORPERAZINE EDISYLATE 5 MG/ML
5 INJECTION INTRAMUSCULAR; INTRAVENOUS EVERY 30 MIN PRN
Status: DISCONTINUED | OUTPATIENT
Start: 2024-12-16 | End: 2024-12-16 | Stop reason: HOSPADM

## 2024-12-16 RX ORDER — DEXTROSE MONOHYDRATE AND SODIUM CHLORIDE 5; .45 G/100ML; G/100ML
INJECTION, SOLUTION INTRAVENOUS CONTINUOUS
Status: DISCONTINUED | OUTPATIENT
Start: 2024-12-16 | End: 2024-12-16 | Stop reason: HOSPADM

## 2024-12-16 RX ORDER — KETOROLAC TROMETHAMINE 30 MG/ML
INJECTION, SOLUTION INTRAMUSCULAR; INTRAVENOUS
Status: DISCONTINUED
Start: 2024-12-16 | End: 2024-12-16 | Stop reason: HOSPADM

## 2024-12-16 RX ORDER — FENTANYL CITRATE 50 UG/ML
INJECTION, SOLUTION INTRAMUSCULAR; INTRAVENOUS
Status: DISCONTINUED | OUTPATIENT
Start: 2024-12-16 | End: 2024-12-16

## 2024-12-16 RX ORDER — ONDANSETRON HYDROCHLORIDE 2 MG/ML
INJECTION, SOLUTION INTRAVENOUS
Status: DISCONTINUED | OUTPATIENT
Start: 2024-12-16 | End: 2024-12-16

## 2024-12-16 RX ORDER — LIDOCAINE HYDROCHLORIDE 20 MG/ML
INJECTION INTRAVENOUS
Status: DISCONTINUED | OUTPATIENT
Start: 2024-12-16 | End: 2024-12-16

## 2024-12-16 RX ORDER — CYCLOBENZAPRINE HCL 5 MG
5 TABLET ORAL 3 TIMES DAILY PRN
COMMUNITY

## 2024-12-16 RX ORDER — DEXAMETHASONE SODIUM PHOSPHATE 4 MG/ML
INJECTION, SOLUTION INTRA-ARTICULAR; INTRALESIONAL; INTRAMUSCULAR; INTRAVENOUS; SOFT TISSUE
Status: DISCONTINUED | OUTPATIENT
Start: 2024-12-16 | End: 2024-12-16

## 2024-12-16 RX ORDER — HYDROMORPHONE HYDROCHLORIDE 1 MG/ML
0.2 INJECTION, SOLUTION INTRAMUSCULAR; INTRAVENOUS; SUBCUTANEOUS EVERY 5 MIN PRN
Status: DISCONTINUED | OUTPATIENT
Start: 2024-12-16 | End: 2024-12-16 | Stop reason: HOSPADM

## 2024-12-16 RX ORDER — DEXMEDETOMIDINE HYDROCHLORIDE 100 UG/ML
INJECTION, SOLUTION INTRAVENOUS
Status: DISCONTINUED | OUTPATIENT
Start: 2024-12-16 | End: 2024-12-16

## 2024-12-16 RX ORDER — OXYCODONE HYDROCHLORIDE 5 MG/1
5 TABLET ORAL EVERY 4 HOURS PRN
Status: DISCONTINUED | OUTPATIENT
Start: 2024-12-16 | End: 2024-12-16

## 2024-12-16 RX ORDER — DIPHENHYDRAMINE HCL 25 MG
25 CAPSULE ORAL EVERY 4 HOURS PRN
Status: DISCONTINUED | OUTPATIENT
Start: 2024-12-16 | End: 2024-12-16 | Stop reason: HOSPADM

## 2024-12-16 RX ORDER — KETOROLAC TROMETHAMINE 30 MG/ML
INJECTION, SOLUTION INTRAMUSCULAR; INTRAVENOUS
Status: DISCONTINUED | OUTPATIENT
Start: 2024-12-16 | End: 2024-12-16

## 2024-12-16 RX ORDER — ONDANSETRON 8 MG/1
8 TABLET, ORALLY DISINTEGRATING ORAL EVERY 8 HOURS PRN
Status: DISCONTINUED | OUTPATIENT
Start: 2024-12-16 | End: 2024-12-16 | Stop reason: HOSPADM

## 2024-12-16 RX ORDER — MIDAZOLAM HYDROCHLORIDE 1 MG/ML
INJECTION INTRAMUSCULAR; INTRAVENOUS
Status: DISCONTINUED | OUTPATIENT
Start: 2024-12-16 | End: 2024-12-16

## 2024-12-16 RX ORDER — OXYCODONE AND ACETAMINOPHEN 5; 325 MG/1; MG/1
1 TABLET ORAL
Status: DISCONTINUED | OUTPATIENT
Start: 2024-12-16 | End: 2024-12-16 | Stop reason: HOSPADM

## 2024-12-16 RX ORDER — PROPOFOL 10 MG/ML
VIAL (ML) INTRAVENOUS
Status: DISCONTINUED | OUTPATIENT
Start: 2024-12-16 | End: 2024-12-16

## 2024-12-16 RX ORDER — FAMOTIDINE 40 MG/1
40 TABLET, FILM COATED ORAL DAILY
COMMUNITY

## 2024-12-16 RX ORDER — FAMOTIDINE 20 MG/1
20 TABLET, FILM COATED ORAL
Status: COMPLETED | OUTPATIENT
Start: 2024-12-16 | End: 2024-12-16

## 2024-12-16 RX ADMIN — ROCURONIUM BROMIDE 40 MG: 10 INJECTION INTRAVENOUS at 11:12

## 2024-12-16 RX ADMIN — LIDOCAINE HYDROCHLORIDE 50 MG: 20 INJECTION INTRAVENOUS at 11:12

## 2024-12-16 RX ADMIN — MIDAZOLAM HYDROCHLORIDE 2 MG: 1 INJECTION, SOLUTION INTRAMUSCULAR; INTRAVENOUS at 11:12

## 2024-12-16 RX ADMIN — SODIUM CHLORIDE: 0.9 INJECTION, SOLUTION INTRAVENOUS at 11:12

## 2024-12-16 RX ADMIN — ONDANSETRON 4 MG: 2 INJECTION INTRAMUSCULAR; INTRAVENOUS at 11:12

## 2024-12-16 RX ADMIN — DEXAMETHASONE SODIUM PHOSPHATE 4 MG: 4 INJECTION INTRA-ARTICULAR; INTRALESIONAL; INTRAMUSCULAR; INTRAVENOUS; SOFT TISSUE at 11:12

## 2024-12-16 RX ADMIN — HYDROMORPHONE HYDROCHLORIDE 0.2 MG: 1 INJECTION, SOLUTION INTRAMUSCULAR; INTRAVENOUS; SUBCUTANEOUS at 12:12

## 2024-12-16 RX ADMIN — KETOROLAC TROMETHAMINE 30 MG: 30 INJECTION, SOLUTION INTRAMUSCULAR; INTRAVENOUS at 12:12

## 2024-12-16 RX ADMIN — FAMOTIDINE 20 MG: 20 TABLET, FILM COATED ORAL at 11:12

## 2024-12-16 RX ADMIN — CEFAZOLIN 2 G: 330 INJECTION, POWDER, FOR SOLUTION INTRAMUSCULAR; INTRAVENOUS at 11:12

## 2024-12-16 RX ADMIN — SUGAMMADEX 200 MG: 100 INJECTION, SOLUTION INTRAVENOUS at 12:12

## 2024-12-16 RX ADMIN — ACETAMINOPHEN 1000 MG: 10 INJECTION INTRAVENOUS at 12:12

## 2024-12-16 RX ADMIN — LEVONORGESTREL 1 INTRA UTERINE DEVICE: 52 INTRAUTERINE DEVICE INTRAUTERINE at 12:12

## 2024-12-16 RX ADMIN — DEXMEDETOMIDINE HYDROCHLORIDE 8 MCG: 100 INJECTION, SOLUTION INTRAVENOUS at 11:12

## 2024-12-16 RX ADMIN — PROPOFOL 150 MG: 10 INJECTION, EMULSION INTRAVENOUS at 11:12

## 2024-12-16 RX ADMIN — FENTANYL CITRATE 50 MCG: 50 INJECTION, SOLUTION INTRAMUSCULAR; INTRAVENOUS at 11:12

## 2024-12-16 RX ADMIN — OXYCODONE HYDROCHLORIDE AND ACETAMINOPHEN 1 TABLET: 5; 325 TABLET ORAL at 12:12

## 2024-12-16 NOTE — PLAN OF CARE
Discharge instructions given and explained to patient and family with verbalization of understanding all instructions. Patient is AAOx3,v/s stable, denies n/v and tolerating po, rates pain level tolerable, IV removed, and family at bedside. Meds delivered to bedside. Patient discharged to home. Patient transported off unit via wheelchair to private vehicle with family.

## 2024-12-16 NOTE — OP NOTE
[facility] ([facility])  OBGYN  Operative Note    SUMMARY     Date of Procedure: 12/16/2024     Procedure: Procedure(s) (LRB):  LAPAROSCOPY, DIAGNOSTIC (N/A)  REMOVAL, INTRAUTERINE DEVICE (N/A)  INSERTION, INTRAUTERINE DEVICE (N/A)       Surgeons and Role:     * Karie Storey MD - Primary     * Deonna Agustin MD - Assisting Surgeon      Pre-Operative Diagnosis: Female pelvic pain [R10.2]  Encounter for removal and reinsertion of intrauterine contraceptive device (IUD) [Z30.433]    Post-Operative Diagnosis: Post-Op Diagnosis Codes:     * Female pelvic pain [R10.2]     * Encounter for removal and reinsertion of intrauterine contraceptive device (IUD) [Z30.433]    Anesthesia: General    Technical Procedures Used: Diagnostic laparoscopy, IUD removal and reinsertion    Description of the Findings of the Procedure: normal uterus, tubes, ovaries, appendix, bowel, liver, gallbladder. No evidence of endometriosis.     Complications: No    Estimated Blood Loss (EBL): * No values recorded between 12/16/2024 11:53 AM and 12/16/2024 12:32 PM * minimal    Specimens:   Specimen (24h ago, onward)      None                    Condition: Good    Disposition: PACU - hemodynamically stable.    Attestation: A qualified resident physician was not available    Procedure in detail:    Patient was taken to the operating room where general anesthesia was performed. She was then prepped and draped in the normal sterile fashion. She was placed in the dorsal lithotomy position in Kunal stirrups. Her arms were tucked in the usual fashion. A hoang was placed to gravity. Her Mirena IUD was removed. A uterine manipulator was placed in the usual fashion.  Attention was then turned abdominally. Towel clamps were used to elevate the abdomen. The veress needle was used to enter the abdomen without difficulty with a starting pressure of 0 mm Hg. The abdomen was then insufflated to 15 mmHg. A 5 mm skin incision was made with a  knife A 5 mm bladeless trocar was then placed using the visiport without difficulty. The patient was then placed in trendelenburg position. A 5 mm bladeless trocar was placed in the LLQ in the usual fashion.   The abdomen was the evaluated and the findings were normal pelvis with no evidence of endometriosis  The trocars were then removed. The skin incisions were closed using 4-0 Monocryl.   Attention was turned vaginally and a Mirena IUD was placed in the usual fashion without difficulty. The instruments were removed from the vagina with excellent hemostasis and no active bleeding was noted. The patient tolerated the procedure well. Sponge lap and needle counts correct x 2.

## 2024-12-16 NOTE — DISCHARGE SUMMARY
[facility] ([facility])  Discharge Summary  Gynecology      Admit Date: 12/16/2024    Discharge Date and Time: 12/16/2024    Attending Physician: Karie Storey MD     Discharge Provider: Karie Storey    Reason for Admission: Diagnostic Laparoscopy, IUD removal and reinsertion    Procedures Performed: Procedure(s) (LRB):  LAPAROSCOPY, DIAGNOSTIC (N/A)  REMOVAL, INTRAUTERINE DEVICE (N/A)  INSERTION, INTRAUTERINE DEVICE (N/A)    Hospital Course (synopsis of major diagnoses, care, treatment, and services provided during the course of the hospital stay): Patient was admitted for surgery. Following surgery she was transferred to recovery and underwent routine postoperative care. She tolerated po, ambulated without difficulty, and pain was well controlled. She was discharged to home in stable condition.      Consults: none    Significant Diagnostic Studies: Labs: CBC   Lab Results   Component Value Date    WBC 5.27 10/10/2024    HGB 14.2 10/10/2024    HCT 42.3 10/10/2024    MCV 89 10/10/2024     10/10/2024       Final Diagnoses:   Principal Problem: Female pelvic pain   Secondary Diagnoses:   Active Hospital Problems    Diagnosis  POA    *Female pelvic pain [R10.2]  Yes      Resolved Hospital Problems   No resolved problems to display.       Discharged Condition: stable    Disposition: Home    Follow Up/Patient Instructions: 2 weeks    Medications:  Reconciled Home Medications:   Current Discharge Medication List        START taking these medications    Details   ibuprofen (ADVIL,MOTRIN) 800 MG tablet Take 1 tablet (800 mg total) by mouth every 8 (eight) hours as needed for Pain.  Qty: 15 tablet, Refills: 0      oxyCODONE-acetaminophen (PERCOCET) 5-325 mg per tablet Take 1 tablet by mouth every 4 (four) hours as needed for Pain.  Qty: 10 tablet, Refills: 0    Comments: Quantity prescribed more than 7 day supply? No  Associated Diagnoses: Female pelvic pain           CONTINUE these medications  which have NOT CHANGED    Details   cyclobenzaprine (FLEXERIL) 5 MG tablet Take 5 mg by mouth 3 (three) times daily as needed for Muscle spasms.      famotidine (PEPCID) 40 MG tablet Take 40 mg by mouth once daily.      meloxicam (MOBIC) 15 MG tablet Take 15 mg by mouth once daily.      FLUoxetine 20 MG capsule Take 1 capsule (20 mg total) by mouth once daily.  Qty: 90 capsule, Refills: 3           Discharge Procedure Orders   Diet general     No dressing needed     Call MD for:  temperature >100.4     Call MD for:  persistent nausea and vomiting     Call MD for:  severe uncontrolled pain     Call MD for:  difficulty breathing, headache or visual disturbances     Call MD for:  redness, tenderness, or signs of infection (pain, swelling, redness, odor or green/yellow discharge around incision site)     Call MD for:  hives     Call MD for:  persistent dizziness or light-headedness     Call MD for:  extreme fatigue     Call MD for:     Activity as tolerated

## 2024-12-16 NOTE — ANESTHESIA POSTPROCEDURE EVALUATION
Anesthesia Post Evaluation    Patient: Inez Escobar    Procedure(s) Performed: Procedure(s) (LRB):  LAPAROSCOPY, DIAGNOSTIC (N/A)  REMOVAL, INTRAUTERINE DEVICE (N/A)  INSERTION, INTRAUTERINE DEVICE (N/A)    Final Anesthesia Type: general      Patient location during evaluation: PACU  Patient participation: Yes- Able to Participate  Level of consciousness: awake and alert  Post-procedure vital signs: reviewed and stable  Pain management: adequate  Airway patency: patent    PONV status at discharge: No PONV  Anesthetic complications: no      Cardiovascular status: stable  Respiratory status: spontaneous ventilation  Hydration status: euvolemic  Follow-up not needed.          Vitals Value Taken Time   /66 12/16/24 1317   Temp 36.7 °C (98 °F) 12/16/24 1236   Pulse 89 12/16/24 1323   Resp 14 12/16/24 1323   SpO2 96 % 12/16/24 1323         Event Time   Out of Recovery 13:03:00         Pain/Nunu Score: Pain Rating Prior to Med Admin: 5 (12/16/2024 12:48 PM)  Pain Rating Post Med Admin: 5 (12/16/2024 12:48 PM)  Nunu Score: 10 (12/16/2024  1:00 PM)

## 2024-12-16 NOTE — ANESTHESIA PROCEDURE NOTES
Intubation    Date/Time: 12/16/2024 11:42 AM    Performed by: Harris Ledesma CRNA  Authorized by: Pedrito Ramirez MD    Intubation:     Induction:  Intravenous    Intubated:  Postinduction    Mask Ventilation:  Easy mask    Attempts:  1    Attempted By:  RANJITH    Blade:  Gutierrez 3    Laryngeal View Grade: Grade I - full view of cords      Difficult Airway Encountered?: No      Complications:  None    Airway Device:  Oral endotracheal tube    Airway Device Size:  7.0    Style/Cuff Inflation:  Cuffed (inflated to minimal occlusive pressure)    Inflation Amount (mL):  2    Tube secured:  21    Secured at:  The lips    Placement Verified By:  Capnometry    Complicating Factors:  None    Findings Post-Intubation:  BS equal bilateral and atraumatic/condition of teeth unchanged

## 2024-12-16 NOTE — PLAN OF CARE
Pt in preop bay 41, VSS, meds given and IV inserted. Pt denies any open wounds on body or the use of any weight loss injections. Pt needs procedural and anesthesia consents.

## 2024-12-16 NOTE — ANESTHESIA PREPROCEDURE EVALUATION
12/16/2024  Inez Escobar is a 24 y.o., female.      Pre-op Assessment    I have reviewed the Patient Summary Reports.     I have reviewed the Nursing Notes. I have reviewed the NPO Status.   I have reviewed the Medications.     Review of Systems  Anesthesia Hx:             Denies Family Hx of Anesthesia complications.    Denies Personal Hx of Anesthesia complications.                      Physical Exam  General: Well nourished    Airway:  Mallampati: II   Mouth Opening: Normal  TM Distance: Normal    Chest/Lungs:  Normal Respiratory Rate    Heart:  Rate: Normal    Anesthesia Plan  Type of Anesthesia, risks & benefits discussed:    Anesthesia Type: Gen ETT  Intra-op Monitoring Plan: Standard ASA Monitors  Post Op Pain Control Plan: multimodal analgesia  Induction:  IV  Airway Plan: Video  Informed Consent: Informed consent signed with the Patient and all parties understand the risks and agree with anesthesia plan.  All questions answered.   ASA Score: 1  Day of Surgery Review of History & Physical: H&P Update referred to the surgeon/provider.    Ready For Surgery From Anesthesia Perspective.   .

## 2024-12-16 NOTE — TRANSFER OF CARE
"Anesthesia Transfer of Care Note    Patient: Inez Escobar    Procedure(s) Performed: Procedure(s) (LRB):  LAPAROSCOPY, DIAGNOSTIC (N/A)  REMOVAL, INTRAUTERINE DEVICE (N/A)  INSERTION, INTRAUTERINE DEVICE (N/A)    Patient location: PACU    Anesthesia Type: general    Transport from OR: Transported from OR on 6-10 L/min O2 by face mask with adequate spontaneous ventilation    Post pain: adequate analgesia    Post assessment: no apparent anesthetic complications    Post vital signs: stable    Level of consciousness: sedated    Nausea/Vomiting: no nausea/vomiting    Complications: none    Transfer of care protocol was followed      Last vitals: Visit Vitals  /75 (BP Location: Left arm, Patient Position: Sitting)   Pulse 96   Temp 37.2 °C (99 °F) (Skin)   Resp 16   Ht 5' 4" (1.626 m)   Wt 55.7 kg (122 lb 12.7 oz)   LMP 12/06/2024   SpO2 98%   Breastfeeding No   BMI 21.08 kg/m²     "

## 2024-12-16 NOTE — PROGRESS NOTES
Certification of Assistant at Surgery       Surgery Date: 12/16/2024     Participating Surgeons:  Surgeons and Role:     * Karie Storey MD - Primary     * Deonna Agustin MD - Assisting    Procedures:  Procedure(s) (LRB):  LAPAROSCOPY, DIAGNOSTIC (N/A)  REMOVAL, INTRAUTERINE DEVICE (N/A)  INSERTION, INTRAUTERINE DEVICE (N/A)    Assistant Surgeon's Certification of Necessity:  I understand that section 1842 (b) (6) (d) of the Social Security Act generally prohibits Medicare Part B reasonable charge payment for the services of assistants at surgery in teaching hospitals when qualified residents are available to furnish such services. I certify that the services for which payment is claimed were medically necessary, and that no qualified resident was available to perform the services. I further understand that these services are subject to post-payment review by the Medicare carrier.      Deonna Agustin MD    12/16/2024  12:19 PM

## 2024-12-16 NOTE — H&P
[facility] ([facility])  History & Physical  Gynecology    SUBJECTIVE:     Chief Complaint/Reason for Admission: pelvic pain    History of Present Illness:  Patient is a 24 y.o.  who presents with long history of pelvic pain.  Here to discuss her pelvic pain and painful periods. She and I have discussed this in the past. She also has a lot of issues with constipation and GI problems. Last visit I suggested pelvic floor PT which has helped some with the dyspareunia but not with the pelvic pain with periods. She has a Mirena in place. The pain is definitely better than it was before the Mirena but she feels that it is just gradually getting worse and worse. She would like to do a diagnostic laparoscopy and would like to replace her Mirena with a new Mirena because the best that the pain was was when it was first inserted. We discussed the surgery in detail, pros can cons. All questions answered.     Notes from previous visits:    Has hx of painful heavy periods and has been told she may have endometriosis. Has been on OCP's in the past and still had long heavy painful periods.  Currently with Mirena IUD in place since 2020.  LMP: Patient's last menstrual period was 2024. Reports in the last 6 months periods have gotten heavier and longer sometimes lasting around 2 weeks.  Endorses pain and cramping originally was improved with IUD and now it has returned as well. Additionally has fatigue, joint pain, back pain, and anxiety during periods.  Feels like she is only having 1 good week out of the month.  Endorses pain with sex (has been to PFPT).  Additionally has painful BM's after her periods to the point of almost passing out (has been to multiple GI docotrs and was told it was anxiety and constipation). Recently had visit with PCP and was told to follow up with GYN as she may need to have workup for endometriosis.     No medications prior to admission.       Review of patient's  allergies indicates:  No Known Allergies    Past Medical History:   Diagnosis Date    Anxiety     Bipolar 1 disorder     Depression     Hx of psychiatric care     Psychiatric exam requested by authority     Psychiatric problem     Therapy      History reviewed. No pertinent surgical history.  Family History   Problem Relation Name Age of Onset    ADD / ADHD Mother Rica Meany     Anxiety disorder Mother Rica Meany     Migraines Mother Rica Meany     Depression Mother Rica Meany     Mental illness Mother Rica Meany     Miscarriages / Stillbirths Mother Rica Meany     Anxiety disorder Father Carlos Enrique Lawtono     Diabetes Father Carlos Enrique Castillociardo     Alcohol abuse Father Carlos Enrique Castillociardo     Depression Father Carlos Enrique Niardo     Drug abuse Father Carlos Enrique Lawtono     Heart disease Father Carlos Enrique Lawtono     Mental illness Father Carlos Enrique Lawtono     No Known Problems Sister      No Known Problems Brother      Drug abuse Maternal Aunt Laury Meany     Mental illness Maternal Aunt Laury Meany     Mental illness Maternal Aunt Edita Meany     Emphysema Maternal Grandfather      Anxiety disorder Maternal Grandmother Mayra Meany     Arrhythmia Maternal Grandmother Mayra Meany         A flutter    Congenital heart disease Maternal Grandmother Mayra Meany     Diabetes Maternal Grandmother Mayra Meany     Depression Maternal Grandmother Mayra Meany     Heart disease Maternal Grandmother Mayra Meany     Mental illness Maternal Grandmother Mayra Meany     Bipolar disorder Paternal Grandfather      Multiple sclerosis Paternal Grandmother Brandy Ricciardo     Diabetes Paternal Grandmother Brandy Ricciardo     Alcohol abuse Paternal Grandmother Brandy Ricciardo     Drug abuse Paternal Grandmother Brandy Ricciardo     Heart attacks under age 50 Neg Hx      Early death Neg Hx      Pacemaker/defibrilator Neg Hx      Breast cancer Neg Hx      Colon cancer Neg Hx      Ovarian cancer Neg Hx       Social  History     Tobacco Use    Smoking status: Never    Smokeless tobacco: Never   Substance Use Topics    Alcohol use: Yes     Comment: rare, every few months    Drug use: Never       Review of Systems:  Constitutional: no fever or chills  Respiratory: no cough or shortness of breath  Cardiovascular: no chest pain or palpitations  Genitourinary: no hematuria or dysuria     OBJECTIVE:     Vital Signs (Most Recent):       Physical Exam:  General: well developed, well nourished      Laboratory:  Lab Results   Component Value Date    WBC 5.27 10/10/2024    HGB 14.2 10/10/2024    HCT 42.3 10/10/2024    MCV 89 10/10/2024     10/10/2024       Ultrasound:  Results for orders placed in visit on 10/15/24    US Pelvis Comp with Transvag NON-OB (xpd    Narrative  EXAMINATION:  US PELVIS COMP WITH TRANSVAG NON-OB (XPD)    CLINICAL HISTORY:  Dysmenorrhea, unspecified    TECHNIQUE:  Transabdominal sonography of the pelvis was performed, followed by transvaginal sonography to better evaluate the uterus and ovaries.    COMPARISON:  03/28/2024    FINDINGS:  Uterus:    Size: 7 x 4 x 3 cm    Masses: None    Endometrium: IUD in satisfactory position.    Right ovary:    Size: 3.4 cm    Appearance: Normal    Vascular flow: Normal    Left ovary:    Size: 3.9 cm    Appearance: Normal    Vascular Flow: Normal.    Free Fluid:    None.    Impression  IUD in satisfactory position.      Electronically signed by: Chepe Whipple Jr  Date:    10/15/2024  Time:    15:02          ASSESSMENT/PLAN:     There are no hospital problems to display for this patient.      To OR for Diagnostic laparoscopy, IUD removal and reinsertion of Mirena  Risks and benefits explained in detail to patient, including but not limited to damage to internal organs, including but not limited to bowel, bladder, uterus, tubes, ovaries, nerves, arteries, veins, possible need for blood transfusion. Patient is aware of all risks and desires surgery. All questions answered.  Consents signed.

## 2024-12-21 ENCOUNTER — PATIENT MESSAGE (OUTPATIENT)
Dept: OBSTETRICS AND GYNECOLOGY | Facility: CLINIC | Age: 24
End: 2024-12-21
Payer: COMMERCIAL

## 2024-12-29 ENCOUNTER — NURSE TRIAGE (OUTPATIENT)
Dept: ADMINISTRATIVE | Facility: CLINIC | Age: 24
End: 2024-12-29
Payer: COMMERCIAL

## 2024-12-29 ENCOUNTER — HOSPITAL ENCOUNTER (EMERGENCY)
Facility: HOSPITAL | Age: 24
Discharge: HOME | End: 2024-12-29
Attending: STUDENT IN AN ORGANIZED HEALTH CARE EDUCATION/TRAINING PROGRAM
Payer: COMMERCIAL

## 2024-12-29 VITALS
DIASTOLIC BLOOD PRESSURE: 56 MMHG | OXYGEN SATURATION: 100 % | BODY MASS INDEX: 20.49 KG/M2 | TEMPERATURE: 98 F | WEIGHT: 120 LBS | HEIGHT: 64 IN | HEART RATE: 84 BPM | SYSTOLIC BLOOD PRESSURE: 91 MMHG | RESPIRATION RATE: 18 BRPM

## 2024-12-29 DIAGNOSIS — R11.0 NAUSEA: Primary | ICD-10-CM

## 2024-12-29 DIAGNOSIS — R10.33 PERIUMBILICAL ABDOMINAL PAIN: ICD-10-CM

## 2024-12-29 LAB
ALBUMIN SERPL BCP-MCNC: 3.9 G/DL (ref 3.5–5.2)
ALP SERPL-CCNC: 66 U/L (ref 40–150)
ALT SERPL W/O P-5'-P-CCNC: 17 U/L (ref 10–44)
ANION GAP SERPL CALC-SCNC: 8 MMOL/L (ref 8–16)
AST SERPL-CCNC: 16 U/L (ref 10–40)
B-HCG UR QL: NEGATIVE
BASOPHILS # BLD AUTO: 0.05 K/UL (ref 0–0.2)
BASOPHILS NFR BLD: 0.5 % (ref 0–1.9)
BILIRUB SERPL-MCNC: 0.5 MG/DL (ref 0.1–1)
BILIRUB UR QL STRIP: NEGATIVE
BUN SERPL-MCNC: 12 MG/DL (ref 6–20)
CALCIUM SERPL-MCNC: 9.1 MG/DL (ref 8.7–10.5)
CHLORIDE SERPL-SCNC: 107 MMOL/L (ref 95–110)
CLARITY UR: CLEAR
CO2 SERPL-SCNC: 24 MMOL/L (ref 23–29)
COLOR UR: COLORLESS
CREAT SERPL-MCNC: 0.7 MG/DL (ref 0.5–1.4)
CTP QC/QA: YES
DIFFERENTIAL METHOD BLD: ABNORMAL
EOSINOPHIL # BLD AUTO: 0.1 K/UL (ref 0–0.5)
EOSINOPHIL NFR BLD: 0.9 % (ref 0–8)
ERYTHROCYTE [DISTWIDTH] IN BLOOD BY AUTOMATED COUNT: 12.1 % (ref 11.5–14.5)
EST. GFR  (NO RACE VARIABLE): >60 ML/MIN/1.73 M^2
GLUCOSE SERPL-MCNC: 85 MG/DL (ref 70–110)
GLUCOSE UR QL STRIP: NEGATIVE
HCT VFR BLD AUTO: 36.3 % (ref 37–48.5)
HGB BLD-MCNC: 12.9 G/DL (ref 12–16)
HGB UR QL STRIP: NEGATIVE
IMM GRANULOCYTES # BLD AUTO: 0.03 K/UL (ref 0–0.04)
IMM GRANULOCYTES NFR BLD AUTO: 0.3 % (ref 0–0.5)
KETONES UR QL STRIP: NEGATIVE
LEUKOCYTE ESTERASE UR QL STRIP: NEGATIVE
LIPASE SERPL-CCNC: 40 U/L (ref 4–60)
LYMPHOCYTES # BLD AUTO: 1.9 K/UL (ref 1–4.8)
LYMPHOCYTES NFR BLD: 19 % (ref 18–48)
MCH RBC QN AUTO: 30.9 PG (ref 27–31)
MCHC RBC AUTO-ENTMCNC: 35.5 G/DL (ref 32–36)
MCV RBC AUTO: 87 FL (ref 82–98)
MONOCYTES # BLD AUTO: 0.8 K/UL (ref 0.3–1)
MONOCYTES NFR BLD: 8.3 % (ref 4–15)
NEUTROPHILS # BLD AUTO: 7.1 K/UL (ref 1.8–7.7)
NEUTROPHILS NFR BLD: 71 % (ref 38–73)
NITRITE UR QL STRIP: NEGATIVE
NRBC BLD-RTO: 0 /100 WBC
OB PNL STL: NEGATIVE
PH UR STRIP: 7 [PH] (ref 5–8)
PLATELET # BLD AUTO: 253 K/UL (ref 150–450)
PMV BLD AUTO: 10.4 FL (ref 9.2–12.9)
POTASSIUM SERPL-SCNC: 3.9 MMOL/L (ref 3.5–5.1)
PROT SERPL-MCNC: 6.8 G/DL (ref 6–8.4)
PROT UR QL STRIP: NEGATIVE
RBC # BLD AUTO: 4.17 M/UL (ref 4–5.4)
SODIUM SERPL-SCNC: 139 MMOL/L (ref 136–145)
SP GR UR STRIP: 1.01 (ref 1–1.03)
URN SPEC COLLECT METH UR: ABNORMAL
UROBILINOGEN UR STRIP-ACNC: NEGATIVE EU/DL
WBC # BLD AUTO: 10.04 K/UL (ref 3.9–12.7)

## 2024-12-29 PROCEDURE — 81025 URINE PREGNANCY TEST: CPT | Performed by: NURSE PRACTITIONER

## 2024-12-29 PROCEDURE — 83690 ASSAY OF LIPASE: CPT | Performed by: NURSE PRACTITIONER

## 2024-12-29 PROCEDURE — 85025 COMPLETE CBC W/AUTO DIFF WBC: CPT | Performed by: NURSE PRACTITIONER

## 2024-12-29 PROCEDURE — 99285 EMERGENCY DEPT VISIT HI MDM: CPT | Mod: 25

## 2024-12-29 PROCEDURE — 80053 COMPREHEN METABOLIC PANEL: CPT | Performed by: NURSE PRACTITIONER

## 2024-12-29 PROCEDURE — 96374 THER/PROPH/DIAG INJ IV PUSH: CPT

## 2024-12-29 PROCEDURE — 81003 URINALYSIS AUTO W/O SCOPE: CPT | Performed by: NURSE PRACTITIONER

## 2024-12-29 PROCEDURE — 82272 OCCULT BLD FECES 1-3 TESTS: CPT | Performed by: NURSE PRACTITIONER

## 2024-12-29 PROCEDURE — 63600175 PHARM REV CODE 636 W HCPCS: Performed by: NURSE PRACTITIONER

## 2024-12-29 PROCEDURE — 96375 TX/PRO/DX INJ NEW DRUG ADDON: CPT

## 2024-12-29 PROCEDURE — 63600175 PHARM REV CODE 636 W HCPCS: Performed by: STUDENT IN AN ORGANIZED HEALTH CARE EDUCATION/TRAINING PROGRAM

## 2024-12-29 RX ORDER — KETOROLAC TROMETHAMINE 10 MG/1
10 TABLET, FILM COATED ORAL EVERY 8 HOURS
Qty: 15 TABLET | Refills: 0 | Status: SHIPPED | OUTPATIENT
Start: 2024-12-29 | End: 2025-01-03

## 2024-12-29 RX ORDER — KETOROLAC TROMETHAMINE 30 MG/ML
15 INJECTION, SOLUTION INTRAMUSCULAR; INTRAVENOUS
Status: COMPLETED | OUTPATIENT
Start: 2024-12-29 | End: 2024-12-29

## 2024-12-29 RX ORDER — ONDANSETRON 4 MG/1
4 TABLET, ORALLY DISINTEGRATING ORAL EVERY 8 HOURS PRN
Qty: 15 TABLET | Refills: 0 | Status: SHIPPED | OUTPATIENT
Start: 2024-12-29

## 2024-12-29 RX ORDER — ONDANSETRON HYDROCHLORIDE 2 MG/ML
4 INJECTION, SOLUTION INTRAVENOUS
Status: COMPLETED | OUTPATIENT
Start: 2024-12-29 | End: 2024-12-29

## 2024-12-29 RX ADMIN — ONDANSETRON 4 MG: 2 INJECTION, SOLUTION INTRAMUSCULAR; INTRAVENOUS at 07:12

## 2024-12-29 RX ADMIN — KETOROLAC TROMETHAMINE 15 MG: 30 INJECTION, SOLUTION INTRAMUSCULAR; INTRAVENOUS at 10:12

## 2024-12-29 NOTE — TELEPHONE ENCOUNTER
Pt's mother reports pt had an IUD placed, having severe cramps now, not on her peroid and it's been 2 weeks since getting the IUD. Pt advised to be seen within 4 hours per protocol, Mother/Pt encouraged to call back with any worsening symptoms or questions. They verbalized understanding.    Reason for Disposition   [1] Constant abdominal pain AND [2] present > 2 hours    Additional Information   Negative: Shock suspected (e.g., cold/pale/clammy skin, too weak to stand, low BP, rapid pulse)   Negative: Sounds like a life-threatening emergency to the triager   Negative: [1] SEVERE abdominal pain (e.g., excruciating) AND [2] present > 1 hour   Negative: SEVERE vaginal bleeding (e.g., soaking 2 pads or tampons per hour and present 2 or more hours; 1 menstrual cup every 2 hours)   Negative: SEVERE dizziness (e.g., unable to stand, requires support to walk, feels like passing out now)   Negative: Patient sounds very sick or weak to the triager   Negative: MODERATE vaginal bleeding (e.g., soaking 1 pad or tampon per hour and present > 6 hours; 1 menstrual cup every 6 hours)   Negative: [1] Vaginal bleeding is WORSE than normal (e.g., heavier) AND [2] dizziness or lightheadedness    Protocols used: Contraception - IUD Symptoms and [id number]

## 2024-12-30 ENCOUNTER — TELEPHONE (OUTPATIENT)
Dept: OBSTETRICS AND GYNECOLOGY | Facility: CLINIC | Age: 24
End: 2024-12-30
Payer: COMMERCIAL

## 2024-12-30 ENCOUNTER — E-VISIT (OUTPATIENT)
Dept: PSYCHIATRY | Facility: CLINIC | Age: 24
End: 2024-12-30
Payer: COMMERCIAL

## 2024-12-30 DIAGNOSIS — F41.1 GENERALIZED ANXIETY DISORDER: Primary | ICD-10-CM

## 2024-12-30 NOTE — ED PROVIDER NOTES
Encounter Date: 12/29/2024       History     Chief Complaint   Patient presents with    Abdominal Pain     Had procedure 2 weeks ago for endometriosis, has been having cramping since. Cramping worse x 1 hour. Pt denies vaginal bleeding.      24 yr old female presents to the ER with reports of nausea, abd pain, diarrhea that began this afternoon. Pt states she had lap performed for endometriosis in addition to a new IUD placed and was feeling fine until today. Denies vaginal bleeding or dc. Denies fever. Reports taking pepto for abd pain and diarrhea in which her stool was dark today. PMH of anxiety,bipolar, depression, endometriosis.     The history is provided by the patient. No  was used.     Review of patient's allergies indicates:  No Known Allergies  Past Medical History:   Diagnosis Date    Anxiety     Bipolar 1 disorder     Depression     Hx of psychiatric care     Psychiatric exam requested by [facility]     Psychiatric problem     Therapy      Past Surgical History:   Procedure Laterality Date    COLONOSCOPY  2020    DIAGNOSTIC LAPAROSCOPY N/A 12/16/2024    Procedure: LAPAROSCOPY, DIAGNOSTIC;  Surgeon: Karie Storey MD;  Location: [facility] OR;  Service: OB/GYN;  Laterality: N/A;    ESOPHAGOGASTRODUODENOSCOPY  2020    INTRAUTERINE DEVICE INSERTION N/A 12/16/2024    Procedure: INSERTION, INTRAUTERINE DEVICE;  Surgeon: Karie Storey MD;  Location: [facility] OR;  Service: OB/GYN;  Laterality: N/A;    REMOVAL OF INTRAUTERINE DEVICE (IUD) N/A 12/16/2024    Procedure: REMOVAL, INTRAUTERINE DEVICE;  Surgeon: Karie Storey MD;  Location: [facility] OR;  Service: OB/GYN;  Laterality: N/A;     Family History   Problem Relation Name Age of Onset    ADD / ADHD Mother Rica Fischer     Anxiety disorder Mother Rica Fischer     Migraines Mother Rica Fischer     Depression Mother Rica Fischer     Mental illness Mother Rica Fischer     Miscarriages / Stillbirths Mother Rica Fischer     Anxiety disorder Father  Carlos Enrique Lawtono     Diabetes Father Carlos Enrique Lawtono     Alcohol abuse Father Carlos Enrique Lawtono     Depression Father Carlos Enrique Lawtono     Drug abuse Father Carlos Enrique Escobar     Heart disease Father Carlos Enrique Lawtono     Mental illness Father Carlos Enrique Lawtono     No Known Problems Sister      No Known Problems Brother      Drug abuse Maternal Aunt Laury Meany     Mental illness Maternal Aunt Laury Meany     Mental illness Maternal Aunt Edita Meany     Emphysema Maternal Grandfather      Anxiety disorder Maternal Grandmother Mayra Meany     Arrhythmia Maternal Grandmother Mayra Meany         A flutter    Congenital heart disease Maternal Grandmother Mayra Meany     Diabetes Maternal Grandmother Mayra Meany     Depression Maternal Grandmother Mayra Meany     Heart disease Maternal Grandmother Mayra Meany     Mental illness Maternal Grandmother Mayra Meany     Bipolar disorder Paternal Grandfather      Multiple sclerosis Paternal Grandmother Brandy Ricciardo     Diabetes Paternal Grandmother Brandy Ricciardo     Alcohol abuse Paternal Grandmother Brandy Ricciardo     Drug abuse Paternal Grandmother Brandy Ricciardo     Heart attacks under age 50 Neg Hx      Early death Neg Hx      Pacemaker/defibrilator Neg Hx      Breast cancer Neg Hx      Colon cancer Neg Hx      Ovarian cancer Neg Hx       Social History     Tobacco Use    Smoking status: Never    Smokeless tobacco: Never   Substance Use Topics    Alcohol use: Yes     Comment: rare, every few months    Drug use: Never     Review of Systems   Gastrointestinal:  Positive for abdominal pain.   Genitourinary:  Positive for pelvic pain.   All other systems reviewed and are negative.      Physical Exam     Initial Vitals [12/29/24 1824]   BP Pulse Resp Temp SpO2   136/73 81 18 98.1 °F (36.7 °C) 100 %      MAP       --         Physical Exam    Constitutional: She appears well-developed and well-nourished.   HENT:   Head: Normocephalic.   Right Ear: Hearing  normal.   Left Ear: Hearing normal.   Nose: Nose normal. Mouth/Throat: Oropharynx is clear and moist.   Eyes: Lids are normal.   Neck:   Normal range of motion.  Cardiovascular:  Normal rate.           Pulmonary/Chest: Breath sounds normal. No respiratory distress. She has no wheezes. She has no rhonchi.   Abdominal: Abdomen is soft. There is abdominal tenderness in the periumbilical area.   Musculoskeletal:         General: Normal range of motion.      Cervical back: Normal range of motion. No rigidity.     Neurological: She is alert and oriented to person, place, and time.   Skin: Skin is warm and dry. No rash noted.   Psychiatric: She has a normal mood and affect. Her behavior is normal. Judgment and thought content normal.         ED Course   Procedures  Labs Reviewed   CBC W/ AUTO DIFFERENTIAL - Abnormal       Result Value    WBC 10.04      RBC 4.17      Hemoglobin 12.9      Hematocrit 36.3 (*)     MCV 87      MCH 30.9      MCHC 35.5      RDW 12.1      Platelets 253      MPV 10.4      Immature Granulocytes 0.3      Gran # (ANC) 7.1      Immature Grans (Abs) 0.03      Lymph # 1.9      Mono # 0.8      Eos # 0.1      Baso # 0.05      nRBC 0      Gran % 71.0      Lymph % 19.0      Mono % 8.3      Eosinophil % 0.9      Basophil % 0.5      Differential Method Automated     URINALYSIS, REFLEX TO URINE CULTURE - Abnormal    Specimen UA Urine, Clean Catch      Color, UA Colorless (*)     Appearance, UA Clear      pH, UA 7.0      Specific Gravity, UA 1.015      Protein, UA Negative      Glucose, UA Negative      Ketones, UA Negative      Bilirubin (UA) Negative      Occult Blood UA Negative      Nitrite, UA Negative      Urobilinogen, UA Negative      Leukocytes, UA Negative      Narrative:     Specimen Source->Urine   COMPREHENSIVE METABOLIC PANEL    Sodium 139      Potassium 3.9      Chloride 107      CO2 24      Glucose 85      BUN 12      Creatinine 0.7      Calcium 9.1      Total Protein 6.8      Albumin 3.9       Total Bilirubin 0.5      Alkaline Phosphatase 66      AST 16      ALT 17      eGFR >60      Anion Gap 8     OCCULT BLOOD X 1, STOOL    Occult Blood Negative     LIPASE    Lipase 40     POCT URINE PREGNANCY    POC Preg Test, Ur Negative       Acceptable Yes            Imaging Results    None          Medications   ondansetron injection 4 mg (4 mg Intravenous Given 12/29/24 1915)   ketorolac injection 15 mg (15 mg Intravenous Given 12/29/24 2201)     Medical Decision Making  Differential Diagnosis includes, but is not limited to:  STI, PID, TOA, ovarian torsion, ovarian cyst, UTI/pyelonephritis, appendicitis, nephrolithiasis, appendicitis, colitis, diverticulitis    Amount and/or Complexity of Data Reviewed  Labs: ordered. Decision-making details documented in ED Course.  Radiology: ordered.    Risk  Prescription drug management.              Attending Attestation:     Physician Attestation Statement for NP/PA:   I personally made/approved the management plan and take responsibility for the patient management.    Other NP/PA Attestation Additions:    History of Present Illness: 24 year old female presents with periumbilical and suprapubic abdominal pain.    Medical Decision Making: CBC, urinalysis, CMP, POC UPT all unremarkable.  Symptoms improved greatly with Toradol administration.  Plan was for ultrasound of the pelvis given symptoms however patient declined preferring to leave the department to get home before the bridge would fog on her way home.  We underwent shared decision-making they understand the risks of leaving.  They will follow up closely with their outpatient OB.  They know that they can return to this or any other emergency department should symptoms worsen, return, or change in character.             ED Course as of 12/31/24 1529   Sun Dec 29, 2024   1940 Occult performed in which there was no melena nor hematochezia noted. [DT]   1941 POCT urine pregnancy [DT]   1951 CBC auto  differential(!) [DT]   2000 Comprehensive metabolic panel [DT]   2006 Lipase [DT]   2104 Pt states she is feeling better with the Zofran that was given. Pelvic ultrasound will be performed. Pt states the cramping is now more intermittent. PT will be followed per Dr Mendoza. Notified to let nurse know if she needs anything for pain control.  [DT]   2209 Patient would like to leave without waiting for the u/s to occur. Advised to f/u with OB tomorrow morning  [KB]      ED Course User Index  [DT] Lauren Azul, LETHA  [KB] Chicho Mendoza MD                           Clinical Impression:  Final diagnoses:  [R11.0] Nausea (Primary)  [R10.33] Periumbilical abdominal pain          ED Disposition Condition    Discharge Stable          ED Prescriptions       Medication Sig Dispense Start Date End Date Auth. Provider    ondansetron (ZOFRAN-ODT) 4 MG TbDL Take 1 tablet (4 mg total) by mouth every 8 (eight) hours as needed (nausea/vomiting). 15 tablet 12/29/2024 -- Chicho Mendoza MD    ketorolac (TORADOL) 10 mg tablet Take 1 tablet (10 mg total) by mouth every 8 (eight) hours. for 5 days 15 tablet 12/29/2024 1/3/2025 Chicho Mendoza MD          Follow-up Information       Follow up With Specialties Details Why Contact Info    You OB/Gyn  Call in 1 day For follow-up on today's visit.     [facility] Emergency Dept Emergency Medicine Go to  As needed, If symptoms worsen [address] [facility] [id number] [phone]             Lauren Azul NP  12/29/24 2107       Chicho Mendoza MD  12/31/24 7715

## 2024-12-30 NOTE — ED NOTES
Pt endorses the IUD was replaced the same time as the laparoscopic procedure that was done two weeks ago.

## 2024-12-31 RX ORDER — FLUOXETINE HYDROCHLORIDE 40 MG/1
40 CAPSULE ORAL DAILY
Qty: 30 CAPSULE | Refills: 3 | Status: SHIPPED | OUTPATIENT
Start: 2024-12-31 | End: 2025-01-30

## 2024-12-31 NOTE — TELEPHONE ENCOUNTER
Inez Browne went to the ER for cramping/pelvic pain.  Cramping has improved but having nausea.  They gave her Zofran.  Has PO on 1/10.

## 2024-12-31 NOTE — PROGRESS NOTES
"Patient ID: Inez Escobar is a 24 y.o. female.    Chief Complaint: Mood Disorder (Entered automatically based on patient selection in [employer].) and Anxiety    The patient initiated a request through [employer] on 12/30/2024 for evaluation and management with a chief complaint of Mood Disorder (Entered automatically based on patient selection in [employer].) and Anxiety     I evaluated the questionnaire submission on 12/30/2024.    [facility] Peq Evisit Anxiety/Depression    12/30/2024  4:53 PM CST - Filed by Patient   Do you agree to participate in an E-Visit? Yes   If you have any of the following symptoms, please present to your local emergency room or call 911:  I acknowledge   Medication requests for narcotics will not be addressed via an E-Visit.  Please schedule an appointment. I acknowledge   Choose the state of your primary residence [place]   Do you have any of the following pregnancy-related conditions? None   What is the main issue you would like addressed today? Anxiety   Fear of embarrassment causes me to avoid doing things or speaking to people. No   I avoid activities in which I am the center of attention. No   Being embarrassed or looking stupid are among my worst fears. No   I would like to address: Medication for Anxiety or Depression   By selecting "I understand," you acknowledge that the answers that you provide for this questionnaire may not be immediately viewed by your provider or your care team. If you are personally dealing with suicidal thoughts or a crisis, please consider contacting your provider's office.  If your provider is not available, please consider taking action by: calling 911 or the National Suicide Prevention Lifeline any day, any time at 1-852.507.9068 or texting SIGNS to 617416 for 24/7 anonymous, free crisis counseling. I understand   Over the last 2 weeks, how often have you been bothered by the following problems?   Little interest or pleasure in doing things Several " days   Feeling down, depressed, or hopeless Not at all   PHQ-2 Score (range: 0 - 6) 1 (Further screening not recommended)   Feeling nervous, anxious, on edge Nearly everyday   Not being able to stop or control worrying Nearly everyday   Worrying too much about different things More than half the days   Trouble relaxing Nearly everyday   Being so restless that its hard to sit still Nearly everyday   Becoming easily annoyed or irritable Nearly everyday   Feeling afraid as if something awful might happen Several days   If you marked you are experiencing any of the aforementioned problems, how difficult have these made it for you to do your work, take care of things at home, or get along with other people? Extremely difficult   TOTAL SCORE: (range: 0 - 21) 18   Do you want to address a new or existing medication? I would like to address a medication I currently take   Would you like to change or continue your medication? Continue medication   What medication would you like to continue?  Prozac   Are you taking it as prescribed? Yes    What medical condition is the  medication intended to treat? Anxiety   Is the medication helping your condition? No   Are you having any side effects from the medication? No   Provide any additional information you feel is important. Bhargavi been struggling with anxiety over winter break, and its causing a lot if stomach aches and nausea. I had a diagnostic laparoscopy on the 16th of December.   Please attach any relevant images or files    Are you able to take your vital signs? No         Encounter Diagnosis   Name Primary?    Generalized anxiety disorder Yes        No orders of the defined types were placed in this encounter.     Medications Ordered This Encounter   Medications    FLUoxetine 40 MG capsule     Sig: Take 1 capsule (40 mg total) by mouth once daily.     Dispense:  30 capsule     Refill:  3        No follow-ups on file.      E-Visit Time Tracking:    Day 1 Time (in minutes):  6    Total Time (in minutes): 6

## 2025-01-10 ENCOUNTER — OFFICE VISIT (OUTPATIENT)
Dept: OBSTETRICS AND GYNECOLOGY | Facility: CLINIC | Age: 25
End: 2025-01-10
Payer: COMMERCIAL

## 2025-01-10 VITALS — SYSTOLIC BLOOD PRESSURE: 107 MMHG | DIASTOLIC BLOOD PRESSURE: 74 MMHG | WEIGHT: 124.13 LBS | BODY MASS INDEX: 21.3 KG/M2

## 2025-01-10 DIAGNOSIS — R10.32 LLQ PAIN: ICD-10-CM

## 2025-01-10 DIAGNOSIS — R10.2 FEMALE PELVIC PAIN: Primary | ICD-10-CM

## 2025-01-10 DIAGNOSIS — N92.0 MENORRHAGIA WITH REGULAR CYCLE: ICD-10-CM

## 2025-01-10 PROCEDURE — 99999 PR PBB SHADOW E&M-EST. PATIENT-LVL III: CPT | Mod: PBBFAC,,, | Performed by: OBSTETRICS & GYNECOLOGY

## 2025-01-10 PROCEDURE — 3074F SYST BP LT 130 MM HG: CPT | Mod: CPTII,S$GLB,, | Performed by: OBSTETRICS & GYNECOLOGY

## 2025-01-10 PROCEDURE — 1159F MED LIST DOCD IN RCRD: CPT | Mod: CPTII,S$GLB,, | Performed by: OBSTETRICS & GYNECOLOGY

## 2025-01-10 PROCEDURE — 81515 NFCT DS BV&VAGINITIS DNA ALG: CPT | Performed by: OBSTETRICS & GYNECOLOGY

## 2025-01-10 PROCEDURE — 99024 POSTOP FOLLOW-UP VISIT: CPT | Mod: S$GLB,,, | Performed by: OBSTETRICS & GYNECOLOGY

## 2025-01-10 PROCEDURE — 3078F DIAST BP <80 MM HG: CPT | Mod: CPTII,S$GLB,, | Performed by: OBSTETRICS & GYNECOLOGY

## 2025-01-10 NOTE — PROGRESS NOTES
Subjective:       Inez Escobar is a 24 y.o. female who presents to the clinic 4 weeks status post laparoscopy for pelvic pain. She recently went to the ER with signiificant LLQ pain. She was approximately 3 days prior to her period starting. The pain resolved on its own. Workup in the ER did not show any etiology for the pain. Pain is usually LLQ. We discussed. I recommend we try OCP to see if there is any component of ovulation to the pain. If the pain improves with the OCP then I think it is likely cyst related or GYN related. If there is no improvement then I would further explore the GI possible causes. Pt agrees with the plan. In the past she stopped OCP because she kept missing pills. Now she has IIUD in place. Also thinks she has BV. Will do affirm.        Objective:      /74   Wt 56.3 kg (124 lb 1.9 oz)   LMP 12/31/2024   BMI 21.30 kg/m²   General:  alert and cooperative   Abdomen: soft, bowel sounds active, non-tender   Incision:    Vulva/vagina-   healing well, no drainage, no erythema, no dehiscence, incision well approximated  Normal no discharge. IUD strings present       Assessment:      Postoperative course complicated by persistent intermittent pelvic LLQ pain with unknown etiology. No known pathology discovered on dx scope.   Operative findings again reviewed. Pathology report discussed.      Plan      1. Continue any current medications.  2. Wound care discussed.  3. Activity restrictions: none  4. Add low dose OCP  5. Affirm done  Follow up in a few months to see how she is doing on the OCP

## 2025-01-11 LAB
BACTERIAL VAGINOSIS DNA: DETECTED
CANDIDA GLABRATA/KRUSEI: NOT DETECTED
CANDIDA RRNA VAG QL PROBE: NOT DETECTED
TRICHOMONAS VAGINALIS: NOT DETECTED

## 2025-01-13 ENCOUNTER — PATIENT MESSAGE (OUTPATIENT)
Dept: OBSTETRICS AND GYNECOLOGY | Facility: CLINIC | Age: 25
End: 2025-01-13
Payer: COMMERCIAL

## 2025-01-13 DIAGNOSIS — N76.0 BV (BACTERIAL VAGINOSIS): Primary | ICD-10-CM

## 2025-01-13 DIAGNOSIS — B96.89 BV (BACTERIAL VAGINOSIS): Primary | ICD-10-CM

## 2025-01-13 RX ORDER — METRONIDAZOLE 500 MG/1
500 TABLET ORAL EVERY 12 HOURS
Qty: 14 TABLET | Refills: 0 | Status: SHIPPED | OUTPATIENT
Start: 2025-01-13 | End: 2025-01-20

## 2025-01-15 ENCOUNTER — PATIENT MESSAGE (OUTPATIENT)
Dept: PSYCHIATRY | Facility: CLINIC | Age: 25
End: 2025-01-15
Payer: COMMERCIAL

## 2025-01-16 RX ORDER — FLUOXETINE HYDROCHLORIDE 40 MG/1
40 CAPSULE ORAL DAILY
Qty: 30 CAPSULE | Refills: 0 | Status: SHIPPED | OUTPATIENT
Start: 2025-01-16 | End: 2025-02-15

## 2025-02-24 ENCOUNTER — OFFICE VISIT (OUTPATIENT)
Dept: PSYCHIATRY | Facility: CLINIC | Age: 25
End: 2025-02-24
Payer: COMMERCIAL

## 2025-02-24 VITALS
DIASTOLIC BLOOD PRESSURE: 67 MMHG | HEART RATE: 81 BPM | BODY MASS INDEX: 21.59 KG/M2 | SYSTOLIC BLOOD PRESSURE: 96 MMHG | WEIGHT: 125.75 LBS

## 2025-02-24 DIAGNOSIS — F41.1 GENERALIZED ANXIETY DISORDER: Primary | ICD-10-CM

## 2025-02-24 DIAGNOSIS — M79.10 MYALGIA: ICD-10-CM

## 2025-02-24 DIAGNOSIS — F40.10 SOCIAL ANXIETY DISORDER: ICD-10-CM

## 2025-02-24 DIAGNOSIS — F41.8 TEST ANXIETY: ICD-10-CM

## 2025-02-24 PROCEDURE — 99214 OFFICE O/P EST MOD 30 MIN: CPT | Mod: S$GLB,,, | Performed by: NURSE PRACTITIONER

## 2025-02-24 PROCEDURE — 3008F BODY MASS INDEX DOCD: CPT | Mod: CPTII,S$GLB,, | Performed by: NURSE PRACTITIONER

## 2025-02-24 PROCEDURE — 1159F MED LIST DOCD IN RCRD: CPT | Mod: CPTII,S$GLB,, | Performed by: NURSE PRACTITIONER

## 2025-02-24 PROCEDURE — G2211 COMPLEX E/M VISIT ADD ON: HCPCS | Mod: S$GLB,,, | Performed by: NURSE PRACTITIONER

## 2025-02-24 PROCEDURE — 3074F SYST BP LT 130 MM HG: CPT | Mod: CPTII,S$GLB,, | Performed by: NURSE PRACTITIONER

## 2025-02-24 PROCEDURE — 99999 PR PBB SHADOW E&M-EST. PATIENT-LVL III: CPT | Mod: PBBFAC,,, | Performed by: NURSE PRACTITIONER

## 2025-02-24 PROCEDURE — 3078F DIAST BP <80 MM HG: CPT | Mod: CPTII,S$GLB,, | Performed by: NURSE PRACTITIONER

## 2025-02-24 PROCEDURE — 1160F RVW MEDS BY RX/DR IN RCRD: CPT | Mod: CPTII,S$GLB,, | Performed by: NURSE PRACTITIONER

## 2025-02-24 RX ORDER — FLUOXETINE HYDROCHLORIDE 40 MG/1
40 CAPSULE ORAL DAILY
Qty: 90 CAPSULE | Refills: 2 | Status: SHIPPED | OUTPATIENT
Start: 2025-02-24 | End: 2025-03-26
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
## 2025-04-21 ENCOUNTER — OFFICE VISIT (OUTPATIENT)
Dept: OBSTETRICS AND GYNECOLOGY | Facility: CLINIC | Age: 25
End: 2025-04-21
Payer: COMMERCIAL

## 2025-04-21 ENCOUNTER — TELEPHONE (OUTPATIENT)
Dept: OBSTETRICS AND GYNECOLOGY | Facility: CLINIC | Age: 25
End: 2025-04-21

## 2025-04-21 DIAGNOSIS — N93.9 ABNORMAL UTERINE BLEEDING (AUB): Primary | ICD-10-CM

## 2025-04-21 PROCEDURE — 98005 SYNCH AUDIO-VIDEO EST LOW 20: CPT | Mod: 95,,, | Performed by: OBSTETRICS & GYNECOLOGY

## 2025-04-21 RX ORDER — LEVONORGESTREL AND ETHINYL ESTRADIOL 0.15-0.03
1 KIT ORAL DAILY
Qty: 91 TABLET | Refills: 3 | Status: SHIPPED | OUTPATIENT
Start: 2025-04-21 | End: 2026-04-21

## 2025-04-21 NOTE — PROGRESS NOTES
The patient location is: [place]  The chief complaint leading to consultation is: follow up OCP    Visit type: audiovisual    Face to Face time with patient: 15 minutes  25 minutes of total time spent on the encounter, which includes face to face time and non-face to face time preparing to see the patient (eg, review of tests), Obtaining and/or reviewing separately obtained history, Documenting clinical information in the electronic or other health record, Independently interpreting results (not separately reported) and communicating results to the patient/family/caregiver, or Care coordination (not separately reported).         Each patient to whom he or she provides medical services by telemedicine is:  (1) informed of the relationship between the physician and patient and the respective role of any other health care provider with respect to management of the patient; and (2) notified that he or she may decline to receive medical services by telemedicine and may withdraw from such care at any time.    Notes: Here for follow up after starting OCP.     Here is HPT from last visit: 1/10/25  Inez Escobar is a 24 y.o. female who presents to the clinic 4 weeks status post laparoscopy for pelvic pain. She recently went to the ER with signiificant LLQ pain. She was approximately 3 days prior to her period starting. The pain resolved on its own. Workup in the ER did not show any etiology for the pain. Pain is usually LLQ. We discussed. I recommend we try OCP to see if there is any component of ovulation to the pain. If the pain improves with the OCP then I think it is likely cyst related or GYN related. If there is no improvement then I would further explore the GI possible causes. Pt agrees with the plan. In the past she stopped OCP because she kept missing pills. Now she has IIUD in place.     Today 4/21/25:   She says that  she has had 2 periods on OCP, Loestrin 24 generic. One was a week, very heavy but  "not painful. And the second period was 13 days and very heavy. She has not missed any pills. She does feel like the pain in general is better. Not having the painful bowel movements as much as she used to. She overall feels less tired when she is on her period.   She is having where everytime after sex her labia/vaginal "swells" and is painful. Not during sex but after. And then the swelling will go down after a day and there will be a ball the size of a alcon in the midline at the opening of introitus that takes a couple of days to go down. This has been going on for the last 2 years. It used to happen every now and then and now it is happening every time. She thought it was from friction, so she tried lubricant and it is still happening. No tears. Had swabs for everything recently and they were all normal. I recommend trying coconut oil and if it is still happening then schedule in person appt with me and have sex the night before.     As far as the pill. I am happy that the pain seems to be better overall the number of days that she bleeds seems to be better. But I want the heaviness to be less. Especially considering she has an IUD in place. I recommend we switch to Seasonale and see if that helps. I explained if she has BTB on active pills then stop and have withdrawal bleed then restart after 5 days. Pt understands. All questions answered.     Will do follow up appt in person in 2-3 months.       1. Abnormal uterine bleeding (AUB)  levonorgestrel-ethinyl estradiol (SEASONALE) 0.15 mg-30 mcg (91) per tablet                "

## 2025-04-21 NOTE — TELEPHONE ENCOUNTER
----- Message from Karie Storey MD sent at 4/21/2025  4:49 PM CDT -----  Schedule in person follow up appt for her in 2-3 months with me please

## 2025-06-26 ENCOUNTER — OFFICE VISIT (OUTPATIENT)
Dept: PSYCHIATRY | Facility: CLINIC | Age: 25
End: 2025-06-26
Payer: COMMERCIAL

## 2025-06-26 VITALS
WEIGHT: 123.81 LBS | BODY MASS INDEX: 21.25 KG/M2 | HEART RATE: 108 BPM | DIASTOLIC BLOOD PRESSURE: 69 MMHG | SYSTOLIC BLOOD PRESSURE: 110 MMHG

## 2025-06-26 DIAGNOSIS — F40.10 SOCIAL ANXIETY DISORDER: ICD-10-CM

## 2025-06-26 DIAGNOSIS — F41.1 GENERALIZED ANXIETY DISORDER: Primary | ICD-10-CM

## 2025-06-26 DIAGNOSIS — F41.8 TEST ANXIETY: ICD-10-CM

## 2025-06-26 PROCEDURE — 3074F SYST BP LT 130 MM HG: CPT | Mod: CPTII,S$GLB,, | Performed by: NURSE PRACTITIONER

## 2025-06-26 PROCEDURE — 99214 OFFICE O/P EST MOD 30 MIN: CPT | Mod: S$GLB,,, | Performed by: NURSE PRACTITIONER

## 2025-06-26 PROCEDURE — 99999 PR PBB SHADOW E&M-EST. PATIENT-LVL III: CPT | Mod: PBBFAC,,, | Performed by: NURSE PRACTITIONER

## 2025-06-26 PROCEDURE — 3078F DIAST BP <80 MM HG: CPT | Mod: CPTII,S$GLB,, | Performed by: NURSE PRACTITIONER

## 2025-06-26 PROCEDURE — 3008F BODY MASS INDEX DOCD: CPT | Mod: CPTII,S$GLB,, | Performed by: NURSE PRACTITIONER

## 2025-06-26 PROCEDURE — 1160F RVW MEDS BY RX/DR IN RCRD: CPT | Mod: CPTII,S$GLB,, | Performed by: NURSE PRACTITIONER

## 2025-06-26 PROCEDURE — 1159F MED LIST DOCD IN RCRD: CPT | Mod: CPTII,S$GLB,, | Performed by: NURSE PRACTITIONER

## 2025-06-26 PROCEDURE — G2211 COMPLEX E/M VISIT ADD ON: HCPCS | Mod: S$GLB,,, | Performed by: NURSE PRACTITIONER

## 2025-06-26 RX ORDER — FLUOXETINE HYDROCHLORIDE 40 MG/1
40 CAPSULE ORAL DAILY
Qty: 90 CAPSULE | Refills: 2 | Status: SHIPPED | OUTPATIENT
Start: 2025-06-26 | End: 2025-07-26
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
## 2025-07-30 ENCOUNTER — TELEPHONE (OUTPATIENT)
Dept: OBSTETRICS AND GYNECOLOGY | Facility: CLINIC | Age: 25
End: 2025-07-30
Payer: COMMERCIAL

## 2025-07-30 ENCOUNTER — OFFICE VISIT (OUTPATIENT)
Dept: OBSTETRICS AND GYNECOLOGY | Facility: CLINIC | Age: 25
End: 2025-07-30
Attending: OBSTETRICS & GYNECOLOGY
Payer: COMMERCIAL

## 2025-07-30 VITALS
BODY MASS INDEX: 21.07 KG/M2 | HEIGHT: 64 IN | WEIGHT: 123.44 LBS | SYSTOLIC BLOOD PRESSURE: 120 MMHG | DIASTOLIC BLOOD PRESSURE: 60 MMHG

## 2025-07-30 DIAGNOSIS — N93.9 ABNORMAL UTERINE BLEEDING (AUB): Primary | ICD-10-CM

## 2025-07-30 PROCEDURE — 3074F SYST BP LT 130 MM HG: CPT | Mod: CPTII,S$GLB,, | Performed by: OBSTETRICS & GYNECOLOGY

## 2025-07-30 PROCEDURE — 3008F BODY MASS INDEX DOCD: CPT | Mod: CPTII,S$GLB,, | Performed by: OBSTETRICS & GYNECOLOGY

## 2025-07-30 PROCEDURE — 3078F DIAST BP <80 MM HG: CPT | Mod: CPTII,S$GLB,, | Performed by: OBSTETRICS & GYNECOLOGY

## 2025-07-30 PROCEDURE — 99213 OFFICE O/P EST LOW 20 MIN: CPT | Mod: S$GLB,,, | Performed by: OBSTETRICS & GYNECOLOGY

## 2025-07-30 PROCEDURE — 1159F MED LIST DOCD IN RCRD: CPT | Mod: CPTII,S$GLB,, | Performed by: OBSTETRICS & GYNECOLOGY

## 2025-07-30 PROCEDURE — 99999 PR PBB SHADOW E&M-EST. PATIENT-LVL III: CPT | Mod: PBBFAC,,, | Performed by: OBSTETRICS & GYNECOLOGY

## 2025-07-30 NOTE — PROGRESS NOTES
"Subjective:       Patient ID: Inez Escobar is a 24 y.o. female.    Chief Complaint:  Follow-up (On Birth control pt doing fine  last Pap 10/11/24 neg )        History of Present Illness  Inez Escobar is a 24 y.o. female  who presents for a 3 month follow up.    Chart Review:    GYN visit: 1/10/25  Inez Escobar is a 24 y.o. female who presents to the clinic 4 weeks status post laparoscopy for pelvic pain. She recently went to the ER with signiificant LLQ pain. She was approximately 3 days prior to her period starting. The pain resolved on its own. Workup in the ER did not show any etiology for the pain. Pain is usually LLQ. We discussed. I recommend we try OCP to see if there is any component of ovulation to the pain. If the pain improves with the OCP then I think it is likely cyst related or GYN related. If there is no improvement then I would further explore the GI possible causes. Pt agrees with the plan. In the past she stopped OCP because she kept missing pills. Now she has IIUD in place.        Gyn Visit: 25:   She says that  she has had 2 periods on OCP, Loestrin 24 generic. One was a week, very heavy but not painful. And the second period was 13 days and very heavy. She has not missed any pills. She does feel like the pain in general is better. Not having the painful bowel movements as much as she used to. She overall feels less tired when she is on her period.   She is having where everytime after sex her labia/vaginal "swells" and is painful. Not during sex but after. And then the swelling will go down after a day and there will be a ball the size of a alcon in the midline at the opening of introitus that takes a couple of days to go down. This has been going on for the last 2 years. It used to happen every now and then and now it is happening every time. She thought it was from friction, so she tried lubricant and it is still happening. No tears. Had swabs " for everything recently and they were all normal. I recommend trying coconut oil and if it is still happening then schedule in person appt with me and have sex the night before.      As far as the pill. I am happy that the pain seems to be better overall the number of days that she bleeds seems to be better. But I want the heaviness to be less. Especially considering she has an IUD in place. I recommend we switch to Seasonale and see if that helps. I explained if she has BTB on active pills then stop and have withdrawal bleed then restart after 5 days. Pt understands. All questions answered.     Today:    She has been taking the Seasonique and has not had any BTB. She is currently on her first period since we last met and it is because she did the placebo week as instructed. She says overall she has been doing very well. She has not had any trips to the ER for pelvic pain. She is not having a period and therefore does not have all the issues that she had when she bled. Overall her pain with sex is definitely better but still happens. She says the vulvar swelling does not happen anymore. It did happen once but it was not as bad. She is not having the pain with BM anymore. She is not having frequent yeast or BV infections. She does not notice any negative side effects from the pill. Overall things seem good except for some persistent pain with sex that does appear to be improved though. She may be diagnosed with Fibromyalgia. Had positive HERNANDEZ but otherwise negative workup. Discussed. All questions answered. I recommend do continuous OCP with no placebo week. RTC in 3 months for annual.     Patient's last menstrual period was 07/29/2025.   Date of Last Pap: 10/17/2024    Review of Systems  Review of Systems     Objective:   Physical Exam:   Constitutional: She appears well-developed and well-nourished.                                  Assessment/ Plan:     1. Abnormal uterine bleeding (AUB)          Continue Seasonique  continuously    Follow up in about 3 months (around 10/30/2025) for Annual exam.    As of April 1, 2021, the Cures Act has been passed nationally. This new law requires that all doctors progress notes, lab results, pathology reports and radiology reports be released IMMEDIATELY to the patient in the patient portal. That means that the results are released to you at the EXACT same time they are released to me. Therefore, with all of the patients that I have I am not able to reply to each patient exactly when the results come in. So there will be a delay from when you see the results to when I see them and have time to come up with a response to send you. Also I only see these results when I am on the computer at work. So if the results come in over the weekend or after 5 pm of a work day, I will not see them until the next business day. As you can tell, this is a challenge as a physician to give every patient the quick response they hope for and deserve. So please be patient! Thanks for understanding, Dr. Storey

## 2025-08-26 ENCOUNTER — PATIENT MESSAGE (OUTPATIENT)
Dept: OBSTETRICS AND GYNECOLOGY | Facility: CLINIC | Age: 25
End: 2025-08-26
Payer: COMMERCIAL

## (undated) DEVICE — CANNULA ENDOPATH XCEL 5X100MM

## (undated) DEVICE — SUT MCRYL PLUS 4-0 PS2 27IN

## (undated) DEVICE — DRAPE STERI LONG

## (undated) DEVICE — SOL POVIDONE SCRUB IODINE 4 OZ

## (undated) DEVICE — PACK LAPAROSCOPY BAPTIST

## (undated) DEVICE — PENCIL ELECTROSURG HOLST W/BLD

## (undated) DEVICE — SOL IRR SOD CHL .9% POUR

## (undated) DEVICE — SOL POVIDONE PREP IODINE 4 OZ

## (undated) DEVICE — SYR 10CC LUER LOCK

## (undated) DEVICE — PAD PINK TRENDELENBURG POS XL

## (undated) DEVICE — SOL IRR 0.9% NACL 500ML PB

## (undated) DEVICE — JELLY SURGILUBE 5GR

## (undated) DEVICE — SOL NORMAL USPCA 0.9%

## (undated) DEVICE — CLIPPER BLADE MOD 4406 (CAREF)

## (undated) DEVICE — NDL INSUFFLATION VERRES 120MM

## (undated) DEVICE — PANTIES FEMININE NAPKIN LG/XLG

## (undated) DEVICE — Device

## (undated) DEVICE — ELECTRODE REM PLYHSV RETURN 9

## (undated) DEVICE — TROCAR ENDOPATH XCEL 5X100MM

## (undated) DEVICE — GLOVE BIOGEL SKINSENSE PI 6.5

## (undated) DEVICE — TRAY DRY SKIN SCRUB PREP

## (undated) DEVICE — SCRUB 10% POVIDONE IODINE 4OZ